# Patient Record
Sex: FEMALE | Race: WHITE | NOT HISPANIC OR LATINO | Employment: OTHER | URBAN - NONMETROPOLITAN AREA
[De-identification: names, ages, dates, MRNs, and addresses within clinical notes are randomized per-mention and may not be internally consistent; named-entity substitution may affect disease eponyms.]

---

## 2019-04-18 ENCOUNTER — OFFICE VISIT (OUTPATIENT)
Dept: NEUROSURGERY | Facility: CLINIC | Age: 59
End: 2019-04-18

## 2019-04-18 VITALS
WEIGHT: 154 LBS | SYSTOLIC BLOOD PRESSURE: 101 MMHG | HEIGHT: 65 IN | BODY MASS INDEX: 25.66 KG/M2 | HEART RATE: 64 BPM | RESPIRATION RATE: 16 BRPM | OXYGEN SATURATION: 95 % | TEMPERATURE: 97.3 F | DIASTOLIC BLOOD PRESSURE: 66 MMHG

## 2019-04-18 DIAGNOSIS — M51.36 DEGENERATIVE DISC DISEASE, LUMBAR: Primary | ICD-10-CM

## 2019-04-18 PROBLEM — M51.369 DEGENERATIVE DISC DISEASE, LUMBAR: Status: ACTIVE | Noted: 2019-04-18

## 2019-04-18 PROCEDURE — 99203 OFFICE O/P NEW LOW 30 MIN: CPT | Performed by: NEUROLOGICAL SURGERY

## 2019-04-18 RX ORDER — ATORVASTATIN CALCIUM 20 MG/1
20 TABLET, FILM COATED ORAL DAILY
COMMUNITY
End: 2022-01-12

## 2019-04-18 RX ORDER — CARVEDILOL 25 MG/1
25 TABLET ORAL 2 TIMES DAILY WITH MEALS
COMMUNITY
End: 2021-02-09

## 2019-04-18 RX ORDER — ASPIRIN 81 MG/1
81 TABLET ORAL DAILY
COMMUNITY

## 2019-04-18 RX ORDER — LISINOPRIL 5 MG/1
5 TABLET ORAL DAILY
COMMUNITY
End: 2021-02-09

## 2019-04-18 RX ORDER — NABUMETONE 750 MG/1
750 TABLET, FILM COATED ORAL 2 TIMES DAILY
Qty: 60 TABLET | Refills: 1 | Status: SHIPPED | OUTPATIENT
Start: 2019-04-18 | End: 2021-02-09

## 2019-04-18 RX ORDER — LEVOTHYROXINE SODIUM 112 UG/1
150 TABLET ORAL DAILY
COMMUNITY
End: 2021-07-14

## 2019-04-18 RX ORDER — SPIRONOLACTONE 25 MG/1
25 TABLET ORAL DAILY
COMMUNITY
End: 2021-02-09

## 2019-04-18 RX ORDER — METHOCARBAMOL 750 MG/1
750 TABLET, FILM COATED ORAL NIGHTLY
Qty: 30 TABLET | Refills: 1 | Status: SHIPPED | OUTPATIENT
Start: 2019-04-18 | End: 2019-06-17

## 2019-04-18 NOTE — PATIENT INSTRUCTIONS
Call Dr. Mascorro on a Monday or Tuesday with an update.    Ask for Mallika () and leave a message for  Dr. Mascorro.   He will call you back at the end of the day as soon as he can.     [phone]

## 2019-04-18 NOTE — PROGRESS NOTES
"Gayatri Kahn  1960  [id number]      Chief Complaint   Patient presents with   • Back Pain     MRI done 12/20/2018   • Neck Pain       HISTORY OF PRESENT ILLNESS: This is a 58-year-old female presenting with a long history of nonradicular low back pain.  When he becomes quite severe it radiates into the anterior thigh.  Does not radiate distal to the knee and is not consistent with neurogenic claudication or nerve root entrapment.  She been to physical therapy in the past which \"made matters worse\".  She has had a lumbar MRI and is referred for neurosurgical consultation.    Past Medical History:   Diagnosis Date   • Arthritis    • Heart disease    • Hyperlipidemia    • Hypertension        Past Surgical History:   Procedure Laterality Date   • FOOT SURGERY     • HYSTERECTOMY     • KNEE SURGERY         Family History   Problem Relation Age of Onset   • Heart disease Mother    • Cancer Father    • Cancer Sister    • Heart disease Sister    • Heart disease Brother        Social History     Socioeconomic History   • Marital status:      Spouse name: Not on file   • Number of children: Not on file   • Years of education: Not on file   • Highest education level: Not on file   Tobacco Use   • Smoking status: Current Every Day Smoker     Packs/day: 1.00     Years: 15.00     Pack years: 15.00     Types: Cigarettes   • Smokeless tobacco: Never Used   Substance and Sexual Activity   • Alcohol use: No     Frequency: Never   • Drug use: No       Allergies   Allergen Reactions   • Erythromycin Nausea Only         Current Outpatient Medications:   •  aspirin 81 MG EC tablet, Take 81 mg by mouth Daily., Disp: , Rfl:   •  atorvastatin (LIPITOR) 20 MG tablet, Take 20 mg by mouth Daily., Disp: , Rfl:   •  carvedilol (COREG) 25 MG tablet, Take 25 mg by mouth 2 (Two) Times a Day With Meals., Disp: , Rfl:   •  levothyroxine (SYNTHROID, LEVOTHROID) 112 MCG tablet, Take 112 mcg by mouth Daily., Disp: , Rfl:   •  lisinopril " "(PRINIVIL,ZESTRIL) 5 MG tablet, Take 5 mg by mouth Daily., Disp: , Rfl:   •  spironolactone (ALDACTONE) 25 MG tablet, Take 25 mg by mouth Daily., Disp: , Rfl:     Review of Systems    Vitals:    04/18/19 1206   BP: 101/66   BP Location: Right arm   Patient Position: Sitting   Pulse: 64   Resp: 16   Temp: 97.3 °F (36.3 °C)   TempSrc: Oral   SpO2: 95%   Weight: 69.9 kg (154 lb)   Height: 165.1 cm (65\")       Neurological Examination:    Mental status/speech: The patient is alert and oriented.  Speech is clear without aphysia or dysarthria.  No overt cognitive deficits.    Cranial nerve examination:    Olfaction: Smell is intact.  Vision: Vision is intact without visual field abnormalities.  Funduscopic examination is normal.  No pupillary irregularity.  Ocular motor examination: The extraocular muscles are intact.  There is no diplopia.  The pupil is round and reactive to both light and accommodation.  There is no nystagmus.  Facial movement/sensation: There is no facial weakness.  Sensation is intact in the first, second, and third divisions of the trigeminal nerve.  The corneal reflex is intact.  Auditory: Hearing is intact to finger rub bilaterally.  Cranial nerves IX, X, XI, XII: Phonation is normal.  No dysphagia.  Tongue is protruded in the midline without atrophy.  The gag reflex is intact.  Shoulder shrug is normal.    Musculoligamentous ligamentous examination: She has limitation of range of motion lumbar spine.  Straight leg raising, Lasegue's and flip test are negative.  I am unable to find any evidence of weakness, sensory loss or reflex asymmetry.  Her gait is normal.          Medical Decision Making:     Diagnostic Data Set: The lumbar MRI data set show the presence of degenerative disc disease.  L2-L3 she has evidence of instability with Modic changes.  She has mild spinal stenosis and disc protrusion L4-5 and L5-S1.  There is no high-grade spinal or neuroforaminal closure however.      Assessment: " Symptomatic degenerative osteoarthritis of the lumbar spine          Recommendations: Surgery is not a consideration.  I have given her a prescription of Relafen 750 mg twice daily Robaxin-750 milligrams at night.  I have suggested physical therapy however she did not wish to pursue that once again.  Therefore, if she is not a lot better I would strongly recommend epidural steroid injection or referral to pain management.  I do not know of any other solution for other than NSAIDs for degenerative osteoarthritic changes of the lumbar spine.        I greatly appreciate the opportunity to see and evaluate this individual.  If you have questions or concerns regarding issues that I may have overlooked please call me at any time: [phone].  Billy Mascorro M.D.  [facility]  [address] [place] [address]    Scribed for John Mascorro MD by Josephine Kemp CMA. 4/18/2019 12:24 PM     I have read and concur with the information provided by the scribe.  John Mascorro MD

## 2019-11-05 ENCOUNTER — TRANSCRIBE ORDERS (OUTPATIENT)
Dept: ADMINISTRATIVE | Facility: HOSPITAL | Age: 59
End: 2019-11-05

## 2019-11-05 DIAGNOSIS — E03.9 HYPOTHYROIDISM, UNSPECIFIED TYPE: ICD-10-CM

## 2019-11-05 DIAGNOSIS — Z87.891 PERSONAL HISTORY OF TOBACCO USE, PRESENTING HAZARDS TO HEALTH: Primary | ICD-10-CM

## 2019-11-11 ENCOUNTER — HOSPITAL ENCOUNTER (OUTPATIENT)
Dept: CT IMAGING | Facility: HOSPITAL | Age: 59
Discharge: HOME | End: 2019-11-11
Admitting: NURSE PRACTITIONER

## 2019-11-11 ENCOUNTER — HOSPITAL ENCOUNTER (OUTPATIENT)
Dept: ULTRASOUND IMAGING | Facility: HOSPITAL | Age: 59
Discharge: HOME | End: 2019-11-11

## 2019-11-11 DIAGNOSIS — E03.9 HYPOTHYROIDISM, UNSPECIFIED TYPE: ICD-10-CM

## 2019-11-11 DIAGNOSIS — Z87.891 PERSONAL HISTORY OF TOBACCO USE, PRESENTING HAZARDS TO HEALTH: ICD-10-CM

## 2019-11-11 PROCEDURE — 76536 US EXAM OF HEAD AND NECK: CPT

## 2019-11-11 PROCEDURE — G0297 LDCT FOR LUNG CA SCREEN: HCPCS

## 2020-08-07 ENCOUNTER — TRANSCRIBE ORDERS (OUTPATIENT)
Dept: ADMINISTRATIVE | Facility: HOSPITAL | Age: 60
End: 2020-08-07

## 2020-08-07 DIAGNOSIS — R91.1 LUNG NODULE: Primary | ICD-10-CM

## 2020-08-21 ENCOUNTER — APPOINTMENT (OUTPATIENT)
Dept: CT IMAGING | Facility: HOSPITAL | Age: 60
End: 2020-08-21

## 2020-09-10 DIAGNOSIS — Z00.6 EXAMINATION FOR NORMAL COMPARISON FOR CLINICAL RESEARCH: Primary | ICD-10-CM

## 2020-09-23 ENCOUNTER — TRANSCRIBE ORDERS (OUTPATIENT)
Dept: ADMINISTRATIVE | Facility: HOSPITAL | Age: 60
End: 2020-09-23

## 2020-09-23 DIAGNOSIS — M54.2 CERVICALGIA: Primary | ICD-10-CM

## 2020-10-10 ENCOUNTER — APPOINTMENT (OUTPATIENT)
Dept: GENERAL RADIOLOGY | Facility: HOSPITAL | Age: 60
End: 2020-10-10

## 2020-10-10 ENCOUNTER — APPOINTMENT (OUTPATIENT)
Dept: CT IMAGING | Facility: HOSPITAL | Age: 60
End: 2020-10-10

## 2020-10-10 ENCOUNTER — HOSPITAL ENCOUNTER (EMERGENCY)
Facility: HOSPITAL | Age: 60
Discharge: ANOTHER HOSPITAL | End: 2020-10-11
Attending: EMERGENCY MEDICINE | Admitting: EMERGENCY MEDICINE

## 2020-10-10 DIAGNOSIS — R47.81 SLURRED SPEECH: ICD-10-CM

## 2020-10-10 DIAGNOSIS — R41.0 CONFUSION: Primary | ICD-10-CM

## 2020-10-10 DIAGNOSIS — R27.0 ATAXIA: ICD-10-CM

## 2020-10-10 LAB
ALBUMIN SERPL-MCNC: 4.4 G/DL (ref 3.5–5.2)
ALBUMIN/GLOB SERPL: 1.6 G/DL
ALP SERPL-CCNC: 87 U/L (ref 39–117)
ALT SERPL W P-5'-P-CCNC: 16 U/L (ref 1–33)
ANION GAP SERPL CALCULATED.3IONS-SCNC: 11.2 MMOL/L (ref 5–15)
AST SERPL-CCNC: 21 U/L (ref 1–32)
BASOPHILS # BLD AUTO: 0.09 10*3/MM3 (ref 0–0.2)
BASOPHILS NFR BLD AUTO: 1.1 % (ref 0–1.5)
BILIRUB SERPL-MCNC: 0.3 MG/DL (ref 0–1.2)
BUN SERPL-MCNC: 9 MG/DL (ref 6–20)
BUN/CREAT SERPL: 12 (ref 7–25)
CALCIUM SPEC-SCNC: 10.1 MG/DL (ref 8.6–10.5)
CHLORIDE SERPL-SCNC: 100 MMOL/L (ref 98–107)
CO2 SERPL-SCNC: 27.8 MMOL/L (ref 22–29)
CREAT SERPL-MCNC: 0.75 MG/DL (ref 0.57–1)
DEPRECATED RDW RBC AUTO: 45.7 FL (ref 37–54)
EOSINOPHIL # BLD AUTO: 0.16 10*3/MM3 (ref 0–0.4)
EOSINOPHIL NFR BLD AUTO: 1.9 % (ref 0.3–6.2)
ERYTHROCYTE [DISTWIDTH] IN BLOOD BY AUTOMATED COUNT: 13.3 % (ref 12.3–15.4)
GFR SERPL CREATININE-BSD FRML MDRD: 79 ML/MIN/1.73
GLOBULIN UR ELPH-MCNC: 2.8 GM/DL
GLUCOSE BLDC GLUCOMTR-MCNC: 110 MG/DL (ref 70–130)
GLUCOSE SERPL-MCNC: 111 MG/DL (ref 65–99)
HCT VFR BLD AUTO: 39.6 % (ref 34–46.6)
HGB BLD-MCNC: 13.6 G/DL (ref 12–15.9)
HOLD SPECIMEN: NORMAL
HOLD SPECIMEN: NORMAL
IMM GRANULOCYTES # BLD AUTO: 0.02 10*3/MM3 (ref 0–0.05)
IMM GRANULOCYTES NFR BLD AUTO: 0.2 % (ref 0–0.5)
LYMPHOCYTES # BLD AUTO: 2.53 10*3/MM3 (ref 0.7–3.1)
LYMPHOCYTES NFR BLD AUTO: 30.4 % (ref 19.6–45.3)
MAGNESIUM SERPL-MCNC: 1.8 MG/DL (ref 1.6–2.6)
MCH RBC QN AUTO: 32.2 PG (ref 26.6–33)
MCHC RBC AUTO-ENTMCNC: 34.3 G/DL (ref 31.5–35.7)
MCV RBC AUTO: 93.8 FL (ref 79–97)
MONOCYTES # BLD AUTO: 0.53 10*3/MM3 (ref 0.1–0.9)
MONOCYTES NFR BLD AUTO: 6.4 % (ref 5–12)
NEUTROPHILS NFR BLD AUTO: 5 10*3/MM3 (ref 1.7–7)
NEUTROPHILS NFR BLD AUTO: 60 % (ref 42.7–76)
NRBC BLD AUTO-RTO: 0 /100 WBC (ref 0–0.2)
PLATELET # BLD AUTO: 292 10*3/MM3 (ref 140–450)
PMV BLD AUTO: 8.9 FL (ref 6–12)
POTASSIUM SERPL-SCNC: 4 MMOL/L (ref 3.5–5.2)
PROT SERPL-MCNC: 7.2 G/DL (ref 6–8.5)
RBC # BLD AUTO: 4.22 10*6/MM3 (ref 3.77–5.28)
SODIUM SERPL-SCNC: 139 MMOL/L (ref 136–145)
TROPONIN T SERPL-MCNC: <0.01 NG/ML (ref 0–0.03)
WBC # BLD AUTO: 8.33 10*3/MM3 (ref 3.4–10.8)
WHOLE BLOOD HOLD SPECIMEN: NORMAL
WHOLE BLOOD HOLD SPECIMEN: NORMAL

## 2020-10-10 PROCEDURE — 93005 ELECTROCARDIOGRAM TRACING: CPT | Performed by: EMERGENCY MEDICINE

## 2020-10-10 PROCEDURE — 80053 COMPREHEN METABOLIC PANEL: CPT | Performed by: EMERGENCY MEDICINE

## 2020-10-10 PROCEDURE — 82962 GLUCOSE BLOOD TEST: CPT

## 2020-10-10 PROCEDURE — 83735 ASSAY OF MAGNESIUM: CPT | Performed by: EMERGENCY MEDICINE

## 2020-10-10 PROCEDURE — 99284 EMERGENCY DEPT VISIT MOD MDM: CPT | Performed by: STUDENT IN AN ORGANIZED HEALTH CARE EDUCATION/TRAINING PROGRAM

## 2020-10-10 PROCEDURE — 84484 ASSAY OF TROPONIN QUANT: CPT | Performed by: EMERGENCY MEDICINE

## 2020-10-10 PROCEDURE — 99284 EMERGENCY DEPT VISIT MOD MDM: CPT

## 2020-10-10 PROCEDURE — 71045 X-RAY EXAM CHEST 1 VIEW: CPT

## 2020-10-10 PROCEDURE — 25010000002 IOPAMIDOL 61 % SOLUTION: Performed by: EMERGENCY MEDICINE

## 2020-10-10 PROCEDURE — 85025 COMPLETE CBC W/AUTO DIFF WBC: CPT | Performed by: EMERGENCY MEDICINE

## 2020-10-10 PROCEDURE — 70498 CT ANGIOGRAPHY NECK: CPT

## 2020-10-10 PROCEDURE — 70496 CT ANGIOGRAPHY HEAD: CPT

## 2020-10-10 PROCEDURE — 70450 CT HEAD/BRAIN W/O DYE: CPT

## 2020-10-10 PROCEDURE — 81001 URINALYSIS AUTO W/SCOPE: CPT | Performed by: EMERGENCY MEDICINE

## 2020-10-10 RX ORDER — SODIUM CHLORIDE 0.9 % (FLUSH) 0.9 %
10 SYRINGE (ML) INJECTION AS NEEDED
Status: DISCONTINUED | OUTPATIENT
Start: 2020-10-10 | End: 2020-10-11 | Stop reason: HOSPADM

## 2020-10-10 RX ADMIN — IOPAMIDOL 100 ML: 612 INJECTION, SOLUTION INTRAVENOUS at 21:58

## 2020-10-11 VITALS
HEART RATE: 99 BPM | OXYGEN SATURATION: 95 % | HEIGHT: 64 IN | RESPIRATION RATE: 14 BRPM | WEIGHT: 160 LBS | TEMPERATURE: 98.2 F | BODY MASS INDEX: 27.31 KG/M2 | DIASTOLIC BLOOD PRESSURE: 95 MMHG | SYSTOLIC BLOOD PRESSURE: 145 MMHG

## 2020-10-11 LAB
BACTERIA UR QL AUTO: ABNORMAL /HPF
BILIRUB UR QL STRIP: NEGATIVE
CLARITY UR: CLEAR
COLOR UR: YELLOW
GLUCOSE UR STRIP-MCNC: NEGATIVE MG/DL
HGB UR QL STRIP.AUTO: ABNORMAL
HYALINE CASTS UR QL AUTO: ABNORMAL /LPF
KETONES UR QL STRIP: NEGATIVE
LEUKOCYTE ESTERASE UR QL STRIP.AUTO: NEGATIVE
NITRITE UR QL STRIP: NEGATIVE
PH UR STRIP.AUTO: 7.5 [PH] (ref 5–8)
PROT UR QL STRIP: NEGATIVE
RBC # UR: ABNORMAL /HPF
REF LAB TEST METHOD: ABNORMAL
SP GR UR STRIP: 1.02 (ref 1–1.03)
SQUAMOUS #/AREA URNS HPF: ABNORMAL /HPF
UROBILINOGEN UR QL STRIP: ABNORMAL
WBC UR QL AUTO: ABNORMAL /HPF

## 2020-10-11 NOTE — ED PROVIDER NOTES
Subjective   60-year-old female presenting with strokelike symptoms.  Patient is brought in by her  who provides most of the history as patient is essentially nonverbal.  4 weeks ago patient was admitted to [facility] for a stroke.  Apparently at that time she had some right-sided weakness, slurred speech.  Has had some residual deficits but today at 2 PM became significantly worse.  No fevers, nausea, vomiting, cough.          Review of Systems   Unable to perform ROS: Other   Dysarthria      Past Medical History:   Diagnosis Date   • Arthritis    • Heart disease    • Hyperlipidemia    • Hypertension    • Stroke (CMS/HCC)        Allergies   Allergen Reactions   • Erythromycin Nausea Only       Past Surgical History:   Procedure Laterality Date   • FOOT SURGERY     • HYSTERECTOMY     • KNEE SURGERY         Family History   Problem Relation Age of Onset   • Heart disease Mother    • Cancer Father    • Cancer Sister    • Heart disease Sister    • Heart disease Brother        Social History     Socioeconomic History   • Marital status:      Spouse name: Not on file   • Number of children: Not on file   • Years of education: Not on file   • Highest education level: Not on file   Tobacco Use   • Smoking status: Current Every Day Smoker     Packs/day: 1.00     Years: 15.00     Pack years: 15.00     Types: Cigarettes   • Smokeless tobacco: Never Used   Substance and Sexual Activity   • Alcohol use: No     Frequency: Never   • Drug use: No           Objective   Physical Exam  Constitutional:       General: She is not in acute distress.     Appearance: She is not toxic-appearing or diaphoretic.      Comments: Chronically ill-appearing   HENT:      Head: Normocephalic and atraumatic.      Right Ear: External ear normal.      Left Ear: External ear normal.      Nose: Nose normal.      Mouth/Throat:      Mouth: Mucous membranes are moist.      Pharynx: Oropharynx is clear.   Eyes:      Extraocular Movements:  Extraocular movements intact.      Conjunctiva/sclera: Conjunctivae normal.      Pupils: Pupils are equal, round, and reactive to light.   Neck:      Musculoskeletal: Normal range of motion.   Cardiovascular:      Rate and Rhythm: Normal rate and regular rhythm.      Pulses: Normal pulses.      Heart sounds: Normal heart sounds.   Pulmonary:      Effort: Pulmonary effort is normal. No respiratory distress.      Breath sounds: Normal breath sounds.   Abdominal:      General: Bowel sounds are normal. There is no distension.      Tenderness: There is no abdominal tenderness.   Musculoskeletal: Normal range of motion.         General: No swelling, tenderness or deformity.   Skin:     General: Skin is warm and dry.      Capillary Refill: Capillary refill takes less than 2 seconds.      Findings: No rash.   Neurological:      Mental Status: She is alert.      Comments: Fairly significant dysarthria, mild left-sided drift and ataxia, cranial nerves grossly intact   Psychiatric:         Mood and Affect: Mood normal.         Behavior: Behavior normal.         Procedures           ED Course                                           MDM  Number of Diagnoses or Management Options  Ataxia:   Confusion:   Slurred speech:   Diagnosis management comments: 60-year-old female with slurred speech, confusion, ataxia.  Chronically ill-appearing lady in no distress with exam as above.  Will obtain head CT, labs, EKG.  Will likely consult neurology.  Disposition pending.    DDX: CVA, TIA, electrolyte abnormality, dehydration    EKG interpreted by me: Sinus tachycardia, short VA, some nonspecific ST/T changes, this is an abnormal EKG    23:18 EDT initial work-up is unremarkable.  Patient has been evaluated by Dr. Husain, there is apparently some history of recent Derrek Mountain spotted fever infection, it sounds like she was treated with a course of antibiotics.  Given her symptoms and recent evaluation at  and inability to get MRI  here tonight he is recommended transfer back to .  We will try to discuss with their neurologist.    23:40 EDT Discussed with Dr. Velasquez, neurology at , he has graciously accepted patient in transfer.      Final diagnoses:   Confusion   Slurred speech   Ataxia            Jose Ingram MD  10/10/20 7984

## 2020-10-11 NOTE — ED NOTES
Dr. Velasquez on the phone to speak with Dr. Ingram. Call transferred.      Robert White  10/10/20 3071

## 2020-10-11 NOTE — CONSULTS
Stroke Consult Note    Patient Name: Gayatri Kahn   MRN: [id number]  Age: 60 y.o.  Sex: female  : 1960    Primary Care Physician: Jaqueline Jean APRN  Referring Physician:  No ref. provider found    TIME STROKE TEAM CALLED:   EST     TIME PATIENT SEEN: 2140  EST    Handedness: R  Race: W    Chief Complaint/Reason for Consultation: right sided weakness     Subjective .  HPI:  This is 60 year old RHWF with h/o ? left cerebral hemisphere stroke 1 month ago s/p residual right sided deficits walks with a cane, tick bite subsequently treated for pili mountain spotted fever prior to stroke presents today to the emergency department for concerns of increased confusion, slurred speech and worsening of old stroke deficits.     Patient was taking aspirin 81 prior to her stroke.   Denies any history of afib.       Last Known Normal Date/Time: 10/853924 @ 6 Am.  EST     Review of Systems   Constitutional: No fatigue  HENT: Negative for nosebleeds and rhinorrhea.    Eyes: Negative for redness.   Respiratory: Negative for cough.    Gastrointestinal: Negative for anal bleeding.   Endocrine: Negative for polydipsia.   Genitourinary: Negative for enuresis and urgency.   Musculoskeletal: Negative for joint swelling.   Neurological: Negative for tremors.   Psychiatric/Behavioral: Negative for hallucinations.     Temp:  [98.2 °F (36.8 °C)] 98.2 °F (36.8 °C)  Heart Rate:  [104] 104  Resp:  [14] 14  BP: (132)/(81) 132/81      GEN: confused  Eyes-show anicteric sclera, moist conjunctiva with no lid lag, no redness  Neck-trachea midline.  There is no thyromegaly.  ENMT-oropharynx clear with moist mucous membranes and good dentition.  Skin- not examined   Cardiovascular exam-no pedal edema, regular rate and rhythm.  CHEST: No signs of resp distress, on room air  Abdomen-no abdominal distention, nontender.  Psychiatric exam-alert oriented x2 with not intact judgment and insight      NEURO    MENTAL STATUS: AAOx2, memory  not intact, fund of knowledge not appropriate    LANG/SPEECH: Naming and repetition not  intact, fluent, unable to  follows 3-step commands    CRANIAL NERVES:      II: left pupil reactive, right pupil does not react,  no VF deficits, fundus(not done)      III, IV, VI: EOM intact, no gaze preference or deviation, no nystagmus.      V: normal sensation in V1, V2, and V3 segments bilaterally      VII: no asymmetry, no nasolabial fold flattening      VIII: normal hearing to speech      IX, X: normal palatal elevation, no uvular deviation      XI: 5/5 head turn and 5/5 shoulder shrug bilaterally      XII: midline tongue protrusion    MOTOR:  Normal tone throughout  Drift in the bilateral lower extremities       REFLEXES:  Not tested     SENSORY:    Normal to light touch all throughout     COORDINATION: Normal finger to nose and unable to do heel to shin, no tremor    STATION: Not assessed due to patient condition    GAIT: Not assessed due to patient condition  Acute Stroke Data    Alteplase (tPA) Inclusion / Exclusion Criteria    Time: 21:52 EDT  Person Administering Scale: Braulio Husain MD    Inclusion Criteria  [x]   18 years of age or greater   []   Onset of symptoms < 4.5 hours before beginning treatment (stroke onset = time patient was last seen well or without symptoms).   []   Diagnosis of acute ischemic stroke causing measurable disabling deficit (Complete Hemianopia, Any Aphasia, Visual or Sensory Extinction, Any weakness limiting sustained effort against gravity)   []   Any remaining deficit considered potentially disabling in view of patient and practitioner   Exclusion criteria (Do not proceed with Alteplase if any are checked under exclusion criteria)  [x]   Onset unknown or GREATER than 4.5 hours   []   ICH on CT/MRI   []   CT demonstrates hypodensity representing acute or subacute infarct   []   Significant head trauma or prior stroke in the previous 3 months   []   Symptoms suggestive of subarachnoid  hemorrhage   []   History of un-ruptured intracranial aneurysm GREATER than 10 mm   []   Recent intracranial or intraspinal surgery within the last 3 months   []   Arterial puncture at a non-compressible site in the previous 7 days   []   Active internal bleeding   []   Acute bleeding tendency   []   Platelet count LESS than 100,000 for known hematological diseases such as leukemia, thrombocytopenia or chronic cirrhosis   []   Current use of anticoagulant with INR GREATER than 1.7 or PT GREATER than 15 seconds, aPTT GREATER than 40 seconds   []   Heparin received within 48 hours, resulting in abnormally elevated aPTT GREATER than upper limit of normal   []   Current use of direct thrombin inhibitors or direct factor Xa inhibitors in the past 48 hours   []   Elevated blood pressure refractory to treatment (systolic GREATER than 185 mm/Hg or diastolic  GREATER than 110 mm/Hg   []   Suspected infective endocarditis and aortic arch dissection   []   Current use of therapeutic treatment dose of low-molecular-weight heparin (LMWH) within the previous 24 hours   []   Structural GI malignancy or bleed   Relative exclusion for all patients  []   Only minor non-disabling symptoms   []   Pregnancy   []   Seizure at onset with postictal residual neurological impairments   []   Major surgery or previous trauma within past 14 days   []   History of previous spontaneous ICH, intracranial neoplasm, or AV malformation   []   Postpartum (within previous 14 days)   []   Recent GI or urinary tract hemorrhage (within previous 21 days)   []   Recent acute MI (within previous 3 months)   []   History of un-ruptured intracranial aneurysm LESS than 10 mm   []   History of ruptured intracranial aneurysm   []   Blood glucose LESS than 50 mg/dL (2.7 mmol/L)   []   Dural puncture within the last 7 days   []   Known GREATER than 10 cerebral microbleeds   Additional exclusions for patients with symptoms onset between 3 and 4.5 hours.  []   Age >  80.   []   On any anticoagulants regardless of INR  >>> Warfarin (Coumadin), Heparin, Enoxaparin (Lovenox), fondaparinux (Arixtra), bivalirudin (Angiomax), Argatroban, dabigatran (Pradaxa), rivaroxaban (Xarelto), or apixaban (Eliquis)   []   Severe stroke (NIHSS > 25).   []   History of BOTH diabetes and previous ischemic stroke.   []   The risks and benefits have been discussed with the patient or family related to the administration of IV Alteplase for stroke symptoms.   []   I have discussed and reviewed the patient's case and imaging with the attending prior to IV Alteplase.    Time Alteplase administered       Past Medical History:   Diagnosis Date   • Arthritis    • Heart disease    • Hyperlipidemia    • Hypertension    • Stroke (CMS/HCC)      Past Surgical History:   Procedure Laterality Date   • FOOT SURGERY     • HYSTERECTOMY     • KNEE SURGERY       Family History   Problem Relation Age of Onset   • Heart disease Mother    • Cancer Father    • Cancer Sister    • Heart disease Sister    • Heart disease Brother      Social History     Socioeconomic History   • Marital status:      Spouse name: Not on file   • Number of children: Not on file   • Years of education: Not on file   • Highest education level: Not on file   Tobacco Use   • Smoking status: Current Every Day Smoker     Packs/day: 1.00     Years: 15.00     Pack years: 15.00     Types: Cigarettes   • Smokeless tobacco: Never Used   Substance and Sexual Activity   • Alcohol use: No     Frequency: Never   • Drug use: No     Allergies   Allergen Reactions   • Erythromycin Nausea Only     Prior to Admission medications    Medication Sig Start Date End Date Taking? Authorizing Provider   aspirin 81 MG EC tablet Take 81 mg by mouth Daily.    ProviderArvind MD   atorvastatin (LIPITOR) 20 MG tablet Take 20 mg by mouth Daily.    Provider, MD Arvind   carvedilol (COREG) 25 MG tablet Take 25 mg by mouth 2 (Two) Times a Day With Meals.     Arvind Maloney MD   levothyroxine (SYNTHROID, LEVOTHROID) 112 MCG tablet Take 112 mcg by mouth Daily.    Arvind Maloney MD   lisinopril (PRINIVIL,ZESTRIL) 5 MG tablet Take 5 mg by mouth Daily.    Arvind Maloney MD   nabumetone (RELAFEN) 750 MG tablet Take 1 tablet by mouth 2 (Two) Times a Day. 19   John Mascorro MD   spironolactone (ALDACTONE) 25 MG tablet Take 25 mg by mouth Daily.    Arvind Maloney MD       Hospital Meds:  Scheduled-    Infusions-     PRNs- •  sodium chloride    Functional Status Prior to Current Stroke/Claritza Score: 3    NIH Stroke Scale  Time: 21:52 EDT  Person Administering Scale: Braulio Husain MD    1a  Level of consciousness: 0=alert; keenly responsive   1b. LOC questions:  0=Performs both tasks correctly   1c. LOC commands: 1=Performs one task correctly   2.  Best Gaze: 0=normal   3.  Visual: 0=No visual loss   4. Facial Palsy: 0=Normal symmetric movement   5a.  Motor left arm: 0=No drift, limb holds 90 (or 45) degrees for full 10 seconds   5b.  Motor right arm: 0=No drift, limb holds 90 (or 45) degrees for full 10 seconds   6a. motor left le=Drift, limb holds 90 (or 45) degrees but drifts down before full 10 seconds: does not hit bed   6b  Motor right le=Drift, limb holds 90 (or 45) degrees but drifts down before full 10 seconds: does not hit bed   7. Limb Ataxia: 0=Absent   8.  Sensory: 0=Normal; no sensory loss   9. Best Language:  1=Mild to moderate aphasia; some obvious loss of fluency or facility of comprehension without significant limitation on ideas expressed or form of expression.   10. Dysarthria: 1=Mild to moderate, patient slurs at least some words and at worst, can be understood with some difficulty   11. Extinction and Inattention: 0=No abnormality    Total:    5       Results Reviewed:  I have personally reviewed current lab, radiology, and data and agree with results.               Assessment/Plan:      This is 60 with h/o  shahla, diagnosed with RMSF 6 weeks ago, subsequently developed stroke with residual right sided deficits  6 Weeks ago and was treated at ,  presented today with worsening speech and right sided deficits with increased confusion.     On my exam, via tele, she is altered and follows commands intermittently. Speech seems to be worse, but rest of the exam is her baseline deficits. CT head no acute abnormality. CTA head documented mild atherosclerosis extracranially and intracranially without flow limiting stenosis.    Patient is not a candidate for acute stroke intervention therapies. But, I am concerned about her confusion and the previous history of RMSF 6 weeks ago. Also, I do not have access to imaging from .  Overall, based on history and exam, she needs infectious work up and may be continuous EEG monitoring for possible subclinical seizures. We do not have access for MRI brain at [place] on the weekend. I recommend transferring the patient to tertiary facility for further evaluation.                 Braulio Husain MD  October 10, 2020  21:52 EDT    Verbal consent taken.  Patient agreeable to be seen via telemedicine.    This was an audio and video enabled telemedicine encounter.

## 2020-12-03 ENCOUNTER — HOSPITAL ENCOUNTER (EMERGENCY)
Facility: HOSPITAL | Age: 60
Discharge: HOME | End: 2020-12-03
Attending: EMERGENCY MEDICINE | Admitting: EMERGENCY MEDICINE

## 2020-12-03 ENCOUNTER — APPOINTMENT (OUTPATIENT)
Dept: CT IMAGING | Facility: HOSPITAL | Age: 60
End: 2020-12-03

## 2020-12-03 VITALS
HEIGHT: 65 IN | TEMPERATURE: 98 F | BODY MASS INDEX: 24.99 KG/M2 | WEIGHT: 150 LBS | RESPIRATION RATE: 20 BRPM | DIASTOLIC BLOOD PRESSURE: 90 MMHG | SYSTOLIC BLOOD PRESSURE: 146 MMHG | OXYGEN SATURATION: 92 % | HEART RATE: 93 BPM

## 2020-12-03 DIAGNOSIS — I15.9 SECONDARY HYPERTENSION: Primary | ICD-10-CM

## 2020-12-03 LAB
ALBUMIN SERPL-MCNC: 4 G/DL (ref 3.5–5.2)
ALBUMIN/GLOB SERPL: 1.3 G/DL
ALP SERPL-CCNC: 104 U/L (ref 39–117)
ALT SERPL W P-5'-P-CCNC: 14 U/L (ref 1–33)
ANION GAP SERPL CALCULATED.3IONS-SCNC: 9 MMOL/L (ref 5–15)
AST SERPL-CCNC: 16 U/L (ref 1–32)
BASOPHILS # BLD AUTO: 0.1 10*3/MM3 (ref 0–0.2)
BASOPHILS NFR BLD AUTO: 1.8 % (ref 0–1.5)
BILIRUB SERPL-MCNC: 0.2 MG/DL (ref 0–1.2)
BUN SERPL-MCNC: 5 MG/DL (ref 6–20)
BUN/CREAT SERPL: 6.9 (ref 7–25)
CALCIUM SPEC-SCNC: 10.5 MG/DL (ref 8.6–10.5)
CHLORIDE SERPL-SCNC: 101 MMOL/L (ref 98–107)
CO2 SERPL-SCNC: 32 MMOL/L (ref 22–29)
CREAT SERPL-MCNC: 0.72 MG/DL (ref 0.57–1)
DEPRECATED RDW RBC AUTO: 46 FL (ref 37–54)
EOSINOPHIL # BLD AUTO: 0.15 10*3/MM3 (ref 0–0.4)
EOSINOPHIL NFR BLD AUTO: 2.6 % (ref 0.3–6.2)
ERYTHROCYTE [DISTWIDTH] IN BLOOD BY AUTOMATED COUNT: 13.3 % (ref 12.3–15.4)
GFR SERPL CREATININE-BSD FRML MDRD: 83 ML/MIN/1.73
GLOBULIN UR ELPH-MCNC: 3 GM/DL
GLUCOSE SERPL-MCNC: 153 MG/DL (ref 65–99)
HCT VFR BLD AUTO: 42 % (ref 34–46.6)
HGB BLD-MCNC: 13.8 G/DL (ref 12–15.9)
IMM GRANULOCYTES # BLD AUTO: 0 10*3/MM3 (ref 0–0.05)
IMM GRANULOCYTES NFR BLD AUTO: 0 % (ref 0–0.5)
LYMPHOCYTES # BLD AUTO: 2.06 10*3/MM3 (ref 0.7–3.1)
LYMPHOCYTES NFR BLD AUTO: 36.2 % (ref 19.6–45.3)
MCH RBC QN AUTO: 30.9 PG (ref 26.6–33)
MCHC RBC AUTO-ENTMCNC: 32.9 G/DL (ref 31.5–35.7)
MCV RBC AUTO: 94 FL (ref 79–97)
MONOCYTES # BLD AUTO: 0.32 10*3/MM3 (ref 0.1–0.9)
MONOCYTES NFR BLD AUTO: 5.6 % (ref 5–12)
NEUTROPHILS NFR BLD AUTO: 3.06 10*3/MM3 (ref 1.7–7)
NEUTROPHILS NFR BLD AUTO: 53.8 % (ref 42.7–76)
NRBC BLD AUTO-RTO: 0 /100 WBC (ref 0–0.2)
PLATELET # BLD AUTO: 274 10*3/MM3 (ref 140–450)
PMV BLD AUTO: 9.2 FL (ref 6–12)
POTASSIUM SERPL-SCNC: 3.8 MMOL/L (ref 3.5–5.2)
PROT SERPL-MCNC: 7 G/DL (ref 6–8.5)
RBC # BLD AUTO: 4.47 10*6/MM3 (ref 3.77–5.28)
SODIUM SERPL-SCNC: 142 MMOL/L (ref 136–145)
WBC # BLD AUTO: 5.69 10*3/MM3 (ref 3.4–10.8)

## 2020-12-03 PROCEDURE — 85025 COMPLETE CBC W/AUTO DIFF WBC: CPT | Performed by: PHYSICIAN ASSISTANT

## 2020-12-03 PROCEDURE — 70450 CT HEAD/BRAIN W/O DYE: CPT

## 2020-12-03 PROCEDURE — 93005 ELECTROCARDIOGRAM TRACING: CPT | Performed by: EMERGENCY MEDICINE

## 2020-12-03 PROCEDURE — 80053 COMPREHEN METABOLIC PANEL: CPT | Performed by: PHYSICIAN ASSISTANT

## 2020-12-03 PROCEDURE — 99283 EMERGENCY DEPT VISIT LOW MDM: CPT

## 2020-12-03 NOTE — ED PROVIDER NOTES
"Subjective   60-year-old female presents with hypertension, she said high readings of her blood pressure at home over the last several days intermittently.  She was taken off lisinopril at [facility] recently, she has a rare neurologic condition.  She has dizziness with the neurologic condition but her daughter states that the dizziness has gotten worse especially when her pressure seems to be up.  She was concerned about \"stroke\" she states it difficult because the neurologic diagnosis that she has also has similar symptoms.      History provided by:  Patient   used: No        Review of Systems   Cardiovascular:        Hypertension   Neurological: Positive for dizziness.   All other systems reviewed and are negative.      Past Medical History:   Diagnosis Date   • Arthritis    • Heart disease    • Hyperlipidemia    • Hypertension    • Lambert-Eaton syndrome (CMS/HCC)    • Stroke (CMS/HCC)        Allergies   Allergen Reactions   • Erythromycin Nausea Only       Past Surgical History:   Procedure Laterality Date   • FOOT SURGERY     • HYSTERECTOMY     • KNEE SURGERY         Family History   Problem Relation Age of Onset   • Heart disease Mother    • Cancer Father    • Cancer Sister    • Heart disease Sister    • Heart disease Brother        Social History     Socioeconomic History   • Marital status:      Spouse name: Not on file   • Number of children: Not on file   • Years of education: Not on file   • Highest education level: Not on file   Tobacco Use   • Smoking status: Current Every Day Smoker     Packs/day: 1.00     Years: 15.00     Pack years: 15.00     Types: Cigarettes   • Smokeless tobacco: Never Used   Substance and Sexual Activity   • Alcohol use: No     Frequency: Never   • Drug use: No           Objective   Physical Exam  Vitals signs and nursing note reviewed.   Constitutional:       Appearance: She is well-developed.   HENT:      Head: Normocephalic.   Neck:      " Musculoskeletal: Normal range of motion and neck supple.   Cardiovascular:      Rate and Rhythm: Normal rate and regular rhythm.   Pulmonary:      Effort: Pulmonary effort is normal.      Breath sounds: Normal breath sounds.   Abdominal:      General: Bowel sounds are normal.      Palpations: Abdomen is soft.   Skin:     General: Skin is warm and dry.   Neurological:      Mental Status: She is alert. Mental status is at baseline.      Sensory: Sensory deficit present.      Motor: Weakness present.      Coordination: Coordination abnormal.      Deep Tendon Reflexes: Reflexes are normal and symmetric.      Comments: Slurred speech         Procedures           ED Course  ED Course as of Dec 03 1252   Thu Dec 03, 2020   1139 EKG interpreted by me: Sinus rhythm, normal rate, some nonspecific T wave changes, no acute ST elevations, this is an abnormal EKG, morphology appears similar to previous    [MP]   1250 Discussed the findings with the patient and her daughter, considering they are under the care of neurologist at  who is recommending hold blood pressure medicines, I recommended that they call neurology, the daughter felt confident she could reach them today, to address blood pressure issues.    [CS]      ED Course User Index  [CS] Eddy Bee Jr., PA-C  [MP] Jose Ingram MD                                           MDM  Number of Diagnoses or Management Options  Secondary hypertension: new and requires workup     Amount and/or Complexity of Data Reviewed  Clinical lab tests: reviewed  Tests in the radiology section of CPT®: reviewed    Risk of Complications, Morbidity, and/or Mortality  Presenting problems: minimal  Diagnostic procedures: minimal  Management options: minimal    Patient Progress  Patient progress: stable      Final diagnoses:   Secondary hypertension            Eddy Bee Jr., PA-C  12/03/20 1253

## 2021-02-09 ENCOUNTER — HOSPITAL ENCOUNTER (OUTPATIENT)
Dept: RADIATION ONCOLOGY | Facility: HOSPITAL | Age: 61
Setting detail: RADIATION/ONCOLOGY SERIES
Discharge: HOME | End: 2021-02-09

## 2021-02-09 ENCOUNTER — OFFICE VISIT (OUTPATIENT)
Dept: RADIATION ONCOLOGY | Facility: HOSPITAL | Age: 61
End: 2021-02-09

## 2021-02-09 ENCOUNTER — CONSULT (OUTPATIENT)
Dept: ONCOLOGY | Facility: CLINIC | Age: 61
End: 2021-02-09

## 2021-02-09 ENCOUNTER — TELEPHONE (OUTPATIENT)
Dept: ONCOLOGY | Facility: CLINIC | Age: 61
End: 2021-02-09

## 2021-02-09 ENCOUNTER — HOSPITAL ENCOUNTER (OUTPATIENT)
Dept: RADIATION ONCOLOGY | Facility: HOSPITAL | Age: 61
Discharge: HOME | End: 2021-02-09

## 2021-02-09 VITALS
SYSTOLIC BLOOD PRESSURE: 112 MMHG | DIASTOLIC BLOOD PRESSURE: 68 MMHG | TEMPERATURE: 96 F | HEART RATE: 104 BPM | RESPIRATION RATE: 18 BRPM | OXYGEN SATURATION: 93 % | BODY MASS INDEX: 24.99 KG/M2 | HEIGHT: 65 IN | WEIGHT: 150 LBS

## 2021-02-09 VITALS
SYSTOLIC BLOOD PRESSURE: 97 MMHG | DIASTOLIC BLOOD PRESSURE: 61 MMHG | RESPIRATION RATE: 18 BRPM | OXYGEN SATURATION: 90 % | TEMPERATURE: 96 F | HEART RATE: 101 BPM

## 2021-02-09 DIAGNOSIS — R59.0 ADENOPATHY, HILAR: Primary | ICD-10-CM

## 2021-02-09 DIAGNOSIS — R59.0 ADENOPATHY, HILAR: ICD-10-CM

## 2021-02-09 DIAGNOSIS — C34.31 SMALL CELL LUNG CANCER, RIGHT LOWER LOBE (HCC): Primary | ICD-10-CM

## 2021-02-09 DIAGNOSIS — C34.31 SMALL CELL LUNG CANCER, RIGHT LOWER LOBE (HCC): ICD-10-CM

## 2021-02-09 DIAGNOSIS — C34.90 SMALL CELL LUNG CANCER (HCC): ICD-10-CM

## 2021-02-09 PROCEDURE — 99205 OFFICE O/P NEW HI 60 MIN: CPT | Performed by: INTERNAL MEDICINE

## 2021-02-09 PROCEDURE — G0463 HOSPITAL OUTPT CLINIC VISIT: HCPCS | Performed by: RADIOLOGY

## 2021-02-09 RX ORDER — GABAPENTIN 300 MG/1
CAPSULE ORAL
COMMUNITY
End: 2021-02-09

## 2021-02-09 RX ORDER — LANCETS 33 GAUGE
EACH MISCELLANEOUS 2 TIMES DAILY
COMMUNITY
Start: 2020-12-02 | End: 2021-02-09

## 2021-02-09 RX ORDER — LEVOCETIRIZINE DIHYDROCHLORIDE 5 MG/1
TABLET, FILM COATED ORAL
COMMUNITY
End: 2021-02-09

## 2021-02-09 RX ORDER — AMOXICILLIN AND CLAVULANATE POTASSIUM 875; 125 MG/1; MG/1
TABLET, FILM COATED ORAL
COMMUNITY
End: 2021-02-09

## 2021-02-09 RX ORDER — BLOOD-GLUCOSE METER
EACH MISCELLANEOUS 2 TIMES DAILY
COMMUNITY
Start: 2020-12-02 | End: 2021-02-09

## 2021-02-09 RX ORDER — MYCOPHENOLATE MOFETIL 500 MG/1
TABLET ORAL
COMMUNITY
Start: 2021-01-20 | End: 2022-01-12

## 2021-02-09 RX ORDER — HYDROCODONE BITARTRATE AND ACETAMINOPHEN 5; 325 MG/1; MG/1
TABLET ORAL
COMMUNITY
End: 2021-02-09

## 2021-02-09 RX ORDER — ALBUTEROL SULFATE 90 UG/1
AEROSOL, METERED RESPIRATORY (INHALATION)
COMMUNITY
End: 2021-02-09

## 2021-02-09 RX ORDER — TIZANIDINE 4 MG/1
TABLET ORAL
COMMUNITY
End: 2021-02-09

## 2021-02-09 RX ORDER — BLOOD SUGAR DIAGNOSTIC
STRIP MISCELLANEOUS 2 TIMES DAILY
COMMUNITY
Start: 2020-12-02 | End: 2021-02-09

## 2021-02-09 RX ORDER — LOSARTAN POTASSIUM 25 MG/1
TABLET ORAL
COMMUNITY
Start: 2020-12-03 | End: 2021-02-09

## 2021-02-09 RX ORDER — METOPROLOL SUCCINATE 25 MG/1
TABLET, EXTENDED RELEASE ORAL
COMMUNITY
Start: 2021-01-13 | End: 2022-01-12

## 2021-02-09 RX ORDER — MECLIZINE HYDROCHLORIDE 25 MG/1
TABLET ORAL
COMMUNITY
Start: 2021-01-13 | End: 2021-02-09

## 2021-02-09 NOTE — PROGRESS NOTES
CONSULTATION NOTE    NAME:      Gayatri Kahn  :                                                          1960  DATE OF CONSULTATION:                       21  REQUESTING PHYSICIAN:                   Susi Irving MD  REASON FOR CONSULTATION:           Further evaluation and management of the patient's presumed small cell lung cancer based on serologic diagnosis for Lambert-Eaton syndrome with a markedly hot right hilar mass with SUV of 14 request of Dr. Irving for consideration of palliative radiotherapy  Small cell lung cancer, right lower lobe (CMS/HCC)  Staging form: Lung, AJCC 8th Edition  - Clinical stage from 2021: Stage IIB (cT1c, cN1, cM0) - Signed by Susi Irving MD on 2021           BRIEF HISTORY:  Gayatri Kahn  is a very pleasant 60 y.o. female  who began to have some dizziness and weakness back in July.  This progressed the patient had an MRI scan of her brain and CT scan of her chest that showed a right hilar mass surrounding the right lower lobe bronchus and artery extending into the area of the subcarinal mediastinum.  Patient's MRI of the brain was normal.  The patient was then sent for bronchoscopy and EBUS first in [place] and then at .  Tissue was obtained from the right lymph nodes but was negative for evidence of malignancy back on 9/10/2020.  The patient then had serologies which were ultimately positive for AGN A1 antibodies which should have a 92% specificity for small cell lung cancer.  She received additional work-up of her head and neck in October to no avail.  This was repeated in 2020.  Patient was then sent for a PET scan 2021 that showed a mass at the right hilum with an SUV of 14 and no other lymphatic or distant disease in the chest.  Patient was referred to Dr. Irving's clinic to discuss possible options.  Patient was deemed to be too weak for chemotherapy patient was referred for palliative radiotherapy with hopes that she could  potentially regain some function and strong enough for systemic therapy.    Patient is unable to consistently speak well enough to have an unaided conversation.  Her  corroborates the above story is able to answer questions for her.  The patient has been reports that she has fairly decent strength in her arms and legs.  She has decent mobility.  He reports it is very difficult for her to begin walking but when she begins she is able to walk fairly well with assistance.  She is unable to walk by herself however, due to her symptoms.  The patient's  reports that she is not had any hemoptysis.  She has minimal cough or changes in her respiratory status.      Allergies   Allergen Reactions   • Erythromycin Nausea Only       Social History     Tobacco Use   • Smoking status: Current Every Day Smoker     Packs/day: 1.00     Years: 15.00     Pack years: 15.00     Types: Cigarettes   • Smokeless tobacco: Never Used   Substance Use Topics   • Alcohol use: No     Frequency: Never   • Drug use: No       Current Outpatient Medications:   •  aspirin 81 MG EC tablet, Take 81 mg by mouth Daily., Disp: , Rfl:   •  atorvastatin (LIPITOR) 20 MG tablet, Take 20 mg by mouth Daily., Disp: , Rfl:   •  Firdapse 10 MG tablet, Take 10 mg by mouth 4 (Four) Times a Day., Disp: , Rfl:   •  levothyroxine (SYNTHROID, LEVOTHROID) 112 MCG tablet, Take 150 mcg by mouth Daily., Disp: , Rfl:   •  metoprolol succinate XL (TOPROL-XL) 25 MG 24 hr tablet, , Disp: , Rfl:   •  mycophenolate (CELLCEPT) 500 MG tablet, , Disp: , Rfl:     Past Medical History:   Diagnosis Date   • Arthritis    • Heart disease    • Hyperlipidemia    • Lambert-Eaton syndrome (CMS/HCC)    • Lung cancer (CMS/HCC)        family history includes Brain cancer in her father; Breast cancer in her sister; Heart disease in her brother, mother, and sister; Kidney disease in her mother.     Past Surgical History:   Procedure Laterality Date   • FOOT SURGERY     • HYSTERECTOMY      • KNEE SURGERY          Review of Systems   Cardiovascular: Positive for leg swelling.   Neurological: Positive for dizziness, light-headedness and speech difficulty.    A full 14 point review of systems was performed and was negative except as noted in the HPI.       Objective   VITAL SIGNS:   Vitals:    02/09/21 1103   BP: 97/61   Pulse: 101   Resp: 18   Temp: 96 °F (35.6 °C)   SpO2: 90%   PainSc: 0-No pain        KPS       70%    Physical Exam  Vitals signs and nursing note reviewed.   Constitutional:       Appearance: She is well-developed.   HENT:      Head: Normocephalic and atraumatic.   Eyes:      Conjunctiva/sclera: Conjunctivae normal.      Pupils: Pupils are equal, round, and reactive to light.   Neck:      Musculoskeletal: Normal range of motion and neck supple.      Comments: No obviously enlarged cervical or supraclavicular LAD.  Cardiovascular:      Comments: Patient well perfused. Non cyanotic. No prominent JVD. No pedal edema  Pulmonary:      Effort: Pulmonary effort is normal.      Breath sounds: Normal breath sounds. No stridor. No wheezing.   Abdominal:      General: There is no distension.      Palpations: Abdomen is soft.   Musculoskeletal: Normal range of motion.      Comments: Patient moves all extremities spontaneously.    Skin:     General: Skin is warm and dry.   Neurological:      Mental Status: She is alert and oriented to person, place, and time.      Motor: Weakness present.      Coordination: Coordination abnormal.      Gait: Gait abnormal.      Comments: Coordination intact.   Psychiatric:         Behavior: Behavior normal.         Thought Content: Thought content normal.         Judgment: Judgment normal.              The following portions of the patient's history were reviewed and updated as appropriate: allergies, current medications, past family history, past medical history, past social history, past surgical history and problem list.    The patient's PET scan from 1/7/2021  was reviewed showing hypermetabolic mass at the right hilum with SUV of 14.  The patient's CT scans of the head were reviewed from September October and December showing no overt abnormalities.    The patient EBUS report from  was reviewed as was her bronchoscopy report from [place]: Both showing no evidence of malignancy in the samples.  Patient CT scan of chest from 9/10/2020 was reviewed showing right hilar mass with no obvious other lymphadenopathy.    Assessment      IMPRESSION:     Mrs. Kahn is a very pleasant 60-year-old female with that what does appear to be limited stage small cell lung cancer.  Her disease had been stable for quite some time.  She does have symptoms somewhat consistent with Lambert-Eaton but are also somewhat different as well.  The largest factor clinching her diagnosis at this time would be the serologies for AgNA 1.  We discussed this with the patient and her  and have offered her palliative radiation treatments to the right hilum.  We discussed that we do not have a final pathologic diagnosis for this and that her working diagnosis is somewhat presumptive, more so than usual.  I explained that these treatments may potentially not benefit her at all and they do have the potential for some harm including collapse of the bronchus or bleeding from major vessels.  There is also no guarantee that this will help alleviate her Lambert-Eaton syndrome.  There is however a chance that he could help alleviate symptoms of her Lambert-Eaton syndrome and in the case that this is cancer, which likely appears to be, then would certainly help prevent progression locally into other sensitive structures.  The patient and her  are willing to try anything which may help her at this time point.  I recommend palliative treatments to dose of 45 Gray in 15 fractions VMAT in effort to minimize dose to her esophagus lung and heart.  I would likely recommend that the patient have her serologies  repeated after he finished treatment should her symptoms improve.    The decision to treat the patient with radiation is a complex one and carries the risk of long-term side effects and complications.    I discussed this plan personally with Dr. Irving.    Greater than 1 hour was spent preparing for and coordinating this visit. >50% of the time was spent in direct face to face conversation with the patient teaching, answering question, and providing explanations regarding the patient's case.    RECOMMENDATIONS:    T1N1 right hilar limited stage small cell lung cancer, presumptive diagnosis  -Serologies positive for Lambert-Eaton syndrome Ag NA-1 antibody + does have a high specificity for small cell lung cancer   -would recommend repeating at the end of treatments  -PET scan shows a mass at the right hilum with SUV of 14 which is highly suspicious for cancer  -I have offered the patient palliative treatments  -They understand that this is a long shot to potentially help her symptoms and is unlikely to extend her life as if this is small cell she is a very high risk for distant recurrence  -Recommend 45 Carvalho in 15 fractions to the right hilum and adjacent mediastinum VMAT in effort to spare the lungs, esophagus, heart.  -CT simulation today given patient's condition and difficulty of traveling from her cancer related issue    Hypertension  -Continue present regimen    Hyper lipidemia  -Continue present regimen    Tobacco abuse  -Recommend cessation of possible         Owen Cardenas MD      Errors in dictation may reflect use of voice recognition software and not all errors in transcription may have been detected prior to signing.

## 2021-02-09 NOTE — PROGRESS NOTES
DATE OF CONSULTATION: 2/9/2021    REFERRING PHYSICIAN: Jaqueline Jean APRN    Dear Jaqueline Tobar APRN  Thank you for asking for my medical advice on this patient. I saw her in the  [place] office on 2/9/2021    REASON FOR CONSULTATION: Right lower lobe small cell lung cancer    HISTORY OF PRESENT ILLNESS: The patient is a very pleasant 60 y.o.  female   who was in her usual state of health until July 2020.  Patient presented with multiple neurologic symptoms including dizziness imbalance and lower extremity weakness.  Never had this problem before.  This has been getting gradually worse.  Denies any headaches no trauma.  Symptoms get worse with activity and improves with rest.  [facility]  MRI brain was negative CT chest revealed right hilar mass.  The patient was referred to pulmonary she had bronchoscopy August 9, 2020 done has noted that was negative for endobronchial lesions.  Endobronchial ultrasound done at  September 9, 2020 with multiple biopsies from right hilar lymph nodes and clear level 11 level 12 mediastinal nodes including level 5 and level 7 all came back negative.  She was discharged from  Hospital October 13, 2020.  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  Her serology revealed AGNA-1 antibody which is 92% specific of small cell lung cancer. The patient had repeat CT chest done December 7, 2020 revealed right hilar mass 2.7 cm in size whole-body PET scan done on November 11, 2021 revealed isolated hypermetabolic activity SUV of 12.  The patient was referred to me for further recommendations.    SUBJECTIVE: When I saw the patient today she is here with her .  She is complaining of dizziness and fatigue.  She is in wheelchair.  She is anxious about her new cancer diagnosis.    Review of Systems   Constitutional: Negative for activity change, appetite change, chills, fatigue, fever and unexpected weight  change.   HENT: Negative for hearing loss, mouth sores, nosebleeds, sore throat and trouble swallowing.    Eyes: Negative for visual disturbance.   Respiratory: Negative for cough, chest tightness, shortness of breath and wheezing.    Cardiovascular: Negative for chest pain, palpitations and leg swelling.   Gastrointestinal: Negative for abdominal distention, abdominal pain, blood in stool, constipation, diarrhea, nausea, rectal pain and vomiting.   Endocrine: Negative for cold intolerance and heat intolerance.   Genitourinary: Negative for difficulty urinating, dysuria, frequency and urgency.   Musculoskeletal: Negative for arthralgias, back pain, gait problem, joint swelling and myalgias.   Skin: Negative for rash.   Neurological: Negative for dizziness, tremors, syncope, weakness, light-headedness, numbness and headaches.   Hematological: Negative for adenopathy. Does not bruise/bleed easily.   Psychiatric/Behavioral: Negative for confusion, sleep disturbance and suicidal ideas. The patient is not nervous/anxious.        Past Medical History:   Diagnosis Date   • Arthritis    • Heart disease    • Hyperlipidemia    • Hypertension    • Lambert-Eaton syndrome (CMS/HCC)    • Stroke (CMS/HCC)        Social History     Socioeconomic History   • Marital status:      Spouse name: Not on file   • Number of children: Not on file   • Years of education: Not on file   • Highest education level: Not on file   Tobacco Use   • Smoking status: Current Every Day Smoker     Packs/day: 1.00     Years: 15.00     Pack years: 15.00     Types: Cigarettes   • Smokeless tobacco: Never Used   Substance and Sexual Activity   • Alcohol use: No     Frequency: Never   • Drug use: No       Family History   Problem Relation Age of Onset   • Heart disease Mother    • Cancer Father    • Cancer Sister    • Heart disease Sister    • Heart disease Brother        Past Surgical History:   Procedure Laterality Date   • FOOT SURGERY     •  HYSTERECTOMY     • KNEE SURGERY         Allergies   Allergen Reactions   • Erythromycin Nausea Only          Current Outpatient Medications:   •  aspirin 81 MG EC tablet, Take 81 mg by mouth Daily., Disp: , Rfl:   •  atorvastatin (LIPITOR) 20 MG tablet, Take 20 mg by mouth Daily., Disp: , Rfl:   •  carvedilol (COREG) 25 MG tablet, Take 25 mg by mouth 2 (Two) Times a Day With Meals., Disp: , Rfl:   •  levothyroxine (SYNTHROID, LEVOTHROID) 112 MCG tablet, Take 112 mcg by mouth Daily., Disp: , Rfl:   •  lisinopril (PRINIVIL,ZESTRIL) 5 MG tablet, Take 5 mg by mouth Daily., Disp: , Rfl:   •  metoprolol tartrate (LOPRESSOR) 25 MG tablet, Take 12.5 mg by mouth 2 (Two) Times a Day., Disp: , Rfl:   •  nabumetone (RELAFEN) 750 MG tablet, Take 1 tablet by mouth 2 (Two) Times a Day., Disp: 60 tablet, Rfl: 1  •  spironolactone (ALDACTONE) 25 MG tablet, Take 25 mg by mouth Daily., Disp: , Rfl:     PHYSICAL EXAMINATION:   There were no vitals taken for this visit.  There were no vitals filed for this visit.                ECOG Performance Status: 3 - Symptomatic, >50% confined to bed  General Appearance:  alert, cooperative, no apparent distress and appears stated age   Neurologic/Psychiatric: A&O x 3, gait steady, appropriate affect, strength 5/5 in all muscle groups   HEENT:  Normocephalic, without obvious abnormality, mucous membranes moist   Neck: Supple, symmetrical, trachea midline, no adenopathy;  No thyromegaly, masses, or tenderness   Lungs:   Clear to auscultation bilaterally; respirations regular, even, and unlabored bilaterally   Heart:  Regular rate and rhythm, no murmurs appreciated   Abdomen:   Soft, non-tender, non-distended and no organomegaly   Lymph nodes: No cervical, supraclavicular, inguinal or axillary adenopathy noted   Extremities: Normal, atraumatic; no clubbing, cyanosis, or edema    Skin: No rashes, ulcers, or suspicious lesions noted       No visits with results within 2 Week(s) from this visit.    Latest known visit with results is:   Admission on 12/03/2020, Discharged on 12/03/2020   Component Date Value Ref Range Status   • WBC 12/03/2020 5.69  3.40 - 10.80 10*3/mm3 Final   • RBC 12/03/2020 4.47  3.77 - 5.28 10*6/mm3 Final   • Hemoglobin 12/03/2020 13.8  12.0 - 15.9 g/dL Final   • Hematocrit 12/03/2020 42.0  34.0 - 46.6 % Final   • MCV 12/03/2020 94.0  79.0 - 97.0 fL Final   • MCH 12/03/2020 30.9  26.6 - 33.0 pg Final   • MCHC 12/03/2020 32.9  31.5 - 35.7 g/dL Final   • RDW 12/03/2020 13.3  12.3 - 15.4 % Final   • RDW-SD 12/03/2020 46.0  37.0 - 54.0 fl Final   • MPV 12/03/2020 9.2  6.0 - 12.0 fL Final   • Platelets 12/03/2020 274  140 - 450 10*3/mm3 Final   • Neutrophil % 12/03/2020 53.8  42.7 - 76.0 % Final   • Lymphocyte % 12/03/2020 36.2  19.6 - 45.3 % Final   • Monocyte % 12/03/2020 5.6  5.0 - 12.0 % Final   • Eosinophil % 12/03/2020 2.6  0.3 - 6.2 % Final   • Basophil % 12/03/2020 1.8* 0.0 - 1.5 % Final   • Immature Grans % 12/03/2020 0.0  0.0 - 0.5 % Final   • Neutrophils, Absolute 12/03/2020 3.06  1.70 - 7.00 10*3/mm3 Final   • Lymphocytes, Absolute 12/03/2020 2.06  0.70 - 3.10 10*3/mm3 Final   • Monocytes, Absolute 12/03/2020 0.32  0.10 - 0.90 10*3/mm3 Final   • Eosinophils, Absolute 12/03/2020 0.15  0.00 - 0.40 10*3/mm3 Final   • Basophils, Absolute 12/03/2020 0.10  0.00 - 0.20 10*3/mm3 Final   • Immature Grans, Absolute 12/03/2020 0.00  0.00 - 0.05 10*3/mm3 Final   • nRBC 12/03/2020 0.0  0.0 - 0.2 /100 WBC Final   • Glucose 12/03/2020 153* 65 - 99 mg/dL Final   • BUN 12/03/2020 5* 6 - 20 mg/dL Final   • Creatinine 12/03/2020 0.72  0.57 - 1.00 mg/dL Final   • Sodium 12/03/2020 142  136 - 145 mmol/L Final   • Potassium 12/03/2020 3.8  3.5 - 5.2 mmol/L Final    Slight hemolysis detected by analyzer. Results may be affected.   • Chloride 12/03/2020 101  98 - 107 mmol/L Final   • CO2 12/03/2020 32.0* 22.0 - 29.0 mmol/L Final   • Calcium 12/03/2020 10.5  8.6 - 10.5 mg/dL Final   • Total Protein  12/03/2020 7.0  6.0 - 8.5 g/dL Final   • Albumin 12/03/2020 4.00  3.50 - 5.20 g/dL Final   • ALT (SGPT) 12/03/2020 14  1 - 33 U/L Final   • AST (SGOT) 12/03/2020 16  1 - 32 U/L Final   • Alkaline Phosphatase 12/03/2020 104  39 - 117 U/L Final   • Total Bilirubin 12/03/2020 0.2  0.0 - 1.2 mg/dL Final   • eGFR Non African Amer 12/03/2020 83  >60 mL/min/1.73 Final   • Globulin 12/03/2020 3.0  gm/dL Final   • A/G Ratio 12/03/2020 1.3  g/dL Final   • BUN/Creatinine Ratio 12/03/2020 6.9* 7.0 - 25.0 Final   • Anion Gap 12/03/2020 9.0  5.0 - 15.0 mmol/L Final        No results found.      DIAGNOSTIC DATA:   1.  Extensive outpatient medical records including DrChely Notes, laboratory results, multiple imaging including CT chest and whole-body PET scan, multiple pathology reports including biopsy from bronchoscopy and later on endobronchial ultrasound all reviewed by me and documented the patient chart under media tab.    ASSESSMENT: The patient is a very pleasant 60 y.o.  female  with right small cell lung cancer    PROBLEM LIST:   1.  Right lower lobe small cell lung cancer T1 N1 M0 stage IIb:  A.  Presented with ataxia and lower extremities weakness  B.  Negative bronchoscopy August 19, 2020 and endobronchial ultrasound September 9, 2020  C.  CT chest done December 7, 2020 revealed 2.7 cm right hilar mass  D.  Whole-body PET scan done January 11, 2020 revealed isolated hypermetabolic activity at the right hilar mass SUV of 12  E. positive serology for AGN A-1 antibody which is 92% predicted for small cell lung cancer, positive N type calcium channel blocker and P/Q type calcium channel blooker.  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  Hypertension  4.  Hypercholesteremia    PLAN:   1. I had a long discussion today with the patient and her  about her  new diagnosis of small cell lung cancer. I did go over the final pathology report in  detail with both of them. I reviewed the patient's documents  including refereing provider's notes, lab results, imaging, and path report.   2.  I will order MRI brain to complete her staging work-up  3.  The patient is not a good candidate for concurrent definitive chemotherapy with radiation given her poor performance status.  4.  Discussed the case with Dr. Cardenas from radiation oncology over the phone who kindly agreed to the patient today and started palpitation to the right hilum.  If the patient improves clinically we will consider adding chemotherapy down the road with for  5.  She will follow-up with me in 2 weeks to evaluate for treatment tolerance.  6.  We will continue metoprolol 12.5 mg daily for hypertension  7. Continue Lipitor 20 mg daily for hypercholesterolemia  Susi Irving MD  2/9/2021

## 2021-02-09 NOTE — TELEPHONE ENCOUNTER
Called and spoke with patients daughter (release on file) and read to her Dr. Irving's plan in his note.  This answered her questions.  Encouraged her at patients f/u to have patient or family member with her to call when MD is in visit so she can hear what he says.  She verbalized understanding.

## 2021-02-09 NOTE — TELEPHONE ENCOUNTER
Gayatri's daughter Maris is asking for a call from someone to discuss pt's appt today and treatment plan     #  [phone]

## 2021-02-16 PROCEDURE — 77338 DESIGN MLC DEVICE FOR IMRT: CPT | Performed by: RADIOLOGY

## 2021-02-16 PROCEDURE — 77301 RADIOTHERAPY DOSE PLAN IMRT: CPT | Performed by: RADIOLOGY

## 2021-02-16 PROCEDURE — 77300 RADIATION THERAPY DOSE PLAN: CPT | Performed by: RADIOLOGY

## 2021-02-22 ENCOUNTER — HOSPITAL ENCOUNTER (OUTPATIENT)
Dept: RADIATION ONCOLOGY | Facility: HOSPITAL | Age: 61
Discharge: HOME | End: 2021-02-22

## 2021-02-23 ENCOUNTER — HOSPITAL ENCOUNTER (OUTPATIENT)
Dept: RADIATION ONCOLOGY | Facility: HOSPITAL | Age: 61
Discharge: HOME | End: 2021-02-23

## 2021-02-23 VITALS — BODY MASS INDEX: 24.1 KG/M2 | WEIGHT: 144.8 LBS

## 2021-02-23 PROCEDURE — 77386: CPT | Performed by: RADIOLOGY

## 2021-02-24 ENCOUNTER — HOSPITAL ENCOUNTER (OUTPATIENT)
Dept: RADIATION ONCOLOGY | Facility: HOSPITAL | Age: 61
Discharge: HOME | End: 2021-02-24

## 2021-02-24 PROCEDURE — 77386: CPT | Performed by: RADIOLOGY

## 2021-02-25 ENCOUNTER — HOSPITAL ENCOUNTER (OUTPATIENT)
Dept: RADIATION ONCOLOGY | Facility: HOSPITAL | Age: 61
Discharge: HOME | End: 2021-02-25

## 2021-02-25 PROCEDURE — 77386: CPT | Performed by: RADIOLOGY

## 2021-02-26 ENCOUNTER — HOSPITAL ENCOUNTER (OUTPATIENT)
Dept: RADIATION ONCOLOGY | Facility: HOSPITAL | Age: 61
Discharge: HOME | End: 2021-02-26

## 2021-02-26 PROCEDURE — 77336 RADIATION PHYSICS CONSULT: CPT | Performed by: RADIOLOGY

## 2021-02-26 PROCEDURE — 77386: CPT | Performed by: RADIOLOGY

## 2021-03-01 ENCOUNTER — HOSPITAL ENCOUNTER (OUTPATIENT)
Dept: RADIATION ONCOLOGY | Facility: HOSPITAL | Age: 61
Setting detail: RADIATION/ONCOLOGY SERIES
Discharge: HOME | End: 2021-03-01

## 2021-03-01 ENCOUNTER — HOSPITAL ENCOUNTER (OUTPATIENT)
Dept: RADIATION ONCOLOGY | Facility: HOSPITAL | Age: 61
Discharge: HOME | End: 2021-03-01

## 2021-03-01 PROCEDURE — 77386: CPT | Performed by: RADIOLOGY

## 2021-03-02 ENCOUNTER — OFFICE VISIT (OUTPATIENT)
Dept: ONCOLOGY | Facility: CLINIC | Age: 61
End: 2021-03-02

## 2021-03-02 ENCOUNTER — HOSPITAL ENCOUNTER (OUTPATIENT)
Dept: RADIATION ONCOLOGY | Facility: HOSPITAL | Age: 61
Discharge: HOME | End: 2021-03-02

## 2021-03-02 ENCOUNTER — HOSPITAL ENCOUNTER (OUTPATIENT)
Dept: MRI IMAGING | Facility: HOSPITAL | Age: 61
Discharge: HOME | End: 2021-03-02
Admitting: INTERNAL MEDICINE

## 2021-03-02 VITALS — WEIGHT: 143.9 LBS | BODY MASS INDEX: 23.95 KG/M2

## 2021-03-02 VITALS
WEIGHT: 144 LBS | DIASTOLIC BLOOD PRESSURE: 80 MMHG | SYSTOLIC BLOOD PRESSURE: 124 MMHG | HEART RATE: 108 BPM | HEIGHT: 65 IN | TEMPERATURE: 96.2 F | OXYGEN SATURATION: 98 % | RESPIRATION RATE: 18 BRPM | BODY MASS INDEX: 23.99 KG/M2

## 2021-03-02 DIAGNOSIS — R59.0 ADENOPATHY, HILAR: ICD-10-CM

## 2021-03-02 DIAGNOSIS — C34.90 SMALL CELL LUNG CANCER (HCC): ICD-10-CM

## 2021-03-02 DIAGNOSIS — C34.31 SMALL CELL LUNG CANCER, RIGHT LOWER LOBE (HCC): Primary | ICD-10-CM

## 2021-03-02 PROCEDURE — 70553 MRI BRAIN STEM W/O & W/DYE: CPT

## 2021-03-02 PROCEDURE — 0 GADOBENATE DIMEGLUMINE 529 MG/ML SOLUTION: Performed by: INTERNAL MEDICINE

## 2021-03-02 PROCEDURE — 99214 OFFICE O/P EST MOD 30 MIN: CPT | Performed by: INTERNAL MEDICINE

## 2021-03-02 PROCEDURE — A9577 INJ MULTIHANCE: HCPCS | Performed by: INTERNAL MEDICINE

## 2021-03-02 PROCEDURE — 77386: CPT | Performed by: RADIOLOGY

## 2021-03-02 RX ORDER — LEVOTHYROXINE SODIUM 0.15 MG/1
150 TABLET ORAL DAILY
COMMUNITY
Start: 2021-02-10

## 2021-03-02 RX ORDER — TRIAMCINOLONE ACETONIDE 1 MG/G
CREAM TOPICAL
COMMUNITY
Start: 2021-02-24 | End: 2022-01-12

## 2021-03-02 RX ADMIN — GADOBENATE DIMEGLUMINE 14 ML: 529 INJECTION, SOLUTION INTRAVENOUS at 09:19

## 2021-03-02 NOTE — PROGRESS NOTES
DATE OF VISIT: 3/2/2021    REASON FOR VISIT: Followup for non-small cell lung cancer     HISTORY OF PRESENT ILLNESS: The patient is a very pleasant 60 y.o. female  with past medical history significant for non-small cell lung cancer diagnosed January 2021. The  patient is here today for scheduled follow-up visit.    SUBJECTIVE: The patient has been doing fairly well. she was able to tolerate  her treatment without any serious side effects. she denied any fever or  chills, no night sweats, denied any headaches    PAST MEDICAL HISTORY/SOCIAL HISTORY/FAMILY HISTORY: Reviewed by me and unchanged from my documentation done on 03/02/21.    Review of Systems   Constitutional: Negative for activity change, appetite change, chills, fatigue, fever and unexpected weight change.   HENT: Negative for hearing loss, mouth sores, nosebleeds, sore throat and trouble swallowing.    Eyes: Negative for visual disturbance.   Respiratory: Negative for cough, chest tightness, shortness of breath and wheezing.    Cardiovascular: Negative for chest pain, palpitations and leg swelling.   Gastrointestinal: Negative for abdominal distention, abdominal pain, blood in stool, constipation, diarrhea, nausea, rectal pain and vomiting.   Endocrine: Negative for cold intolerance and heat intolerance.   Genitourinary: Negative for difficulty urinating, dysuria, frequency and urgency.   Musculoskeletal: Negative for arthralgias, back pain, gait problem, joint swelling and myalgias.   Skin: Negative for rash.   Neurological: Negative for dizziness, tremors, syncope, weakness, light-headedness, numbness and headaches.   Hematological: Negative for adenopathy. Does not bruise/bleed easily.   Psychiatric/Behavioral: Negative for confusion, sleep disturbance and suicidal ideas. The patient is not nervous/anxious.          Current Outpatient Medications:   •  aspirin 81 MG EC tablet, Take 81 mg by mouth Daily., Disp: , Rfl:   •  atorvastatin (LIPITOR) 20 MG  "tablet, Take 20 mg by mouth Daily., Disp: , Rfl:   •  Firdapse 10 MG tablet, Take 10 mg by mouth 4 (Four) Times a Day., Disp: , Rfl:   •  levothyroxine (SYNTHROID, LEVOTHROID) 112 MCG tablet, Take 150 mcg by mouth Daily., Disp: , Rfl:   •  levothyroxine (SYNTHROID, LEVOTHROID) 150 MCG tablet, , Disp: , Rfl:   •  metoprolol succinate XL (TOPROL-XL) 25 MG 24 hr tablet, , Disp: , Rfl:   •  mycophenolate (CELLCEPT) 500 MG tablet, , Disp: , Rfl:   •  triamcinolone (KENALOG) 0.1 % cream, , Disp: , Rfl:   No current facility-administered medications for this visit.     PHYSICAL EXAMINATION:   /80   Pulse 108   Temp 96.2 °F (35.7 °C) (Temporal)   Resp 18   Ht 165.1 cm (65\")   Wt 65.3 kg (144 lb)   SpO2 98%   BMI 23.96 kg/m²    Pain Score    03/02/21 1132   PainSc: 0-No pain        ECOG score: 2            ECOG Performance Status: 1 - Symptomatic but completely ambulatory  General Appearance:  alert, cooperative, no apparent distress and appears stated age   Neurologic/Psychiatric: A&O x 3, gait steady, appropriate affect, strength 5/5 in all muscle groups   HEENT:  Normocephalic, without obvious abnormality, mucous membranes moist   Neck: Supple, symmetrical, trachea midline, no adenopathy;  No thyromegaly, masses, or tenderness   Lungs:   Clear to auscultation bilaterally; respirations regular, even, and unlabored bilaterally   Heart:  Regular rate and rhythm, no murmurs appreciated   Abdomen:   Soft, non-tender, non-distended and no organomegaly   Lymph nodes: No cervical, supraclavicular, inguinal or axillary adenopathy noted   Extremities: Normal, atraumatic; no clubbing, cyanosis, or edema    Skin: No rashes, ulcers, or suspicious lesions noted     No visits with results within 2 Week(s) from this visit.   Latest known visit with results is:   Admission on 12/03/2020, Discharged on 12/03/2020   Component Date Value Ref Range Status   • WBC 12/03/2020 5.69  3.40 - 10.80 10*3/mm3 Final   • RBC 12/03/2020 " 4.47  3.77 - 5.28 10*6/mm3 Final   • Hemoglobin 12/03/2020 13.8  12.0 - 15.9 g/dL Final   • Hematocrit 12/03/2020 42.0  34.0 - 46.6 % Final   • MCV 12/03/2020 94.0  79.0 - 97.0 fL Final   • MCH 12/03/2020 30.9  26.6 - 33.0 pg Final   • MCHC 12/03/2020 32.9  31.5 - 35.7 g/dL Final   • RDW 12/03/2020 13.3  12.3 - 15.4 % Final   • RDW-SD 12/03/2020 46.0  37.0 - 54.0 fl Final   • MPV 12/03/2020 9.2  6.0 - 12.0 fL Final   • Platelets 12/03/2020 274  140 - 450 10*3/mm3 Final   • Neutrophil % 12/03/2020 53.8  42.7 - 76.0 % Final   • Lymphocyte % 12/03/2020 36.2  19.6 - 45.3 % Final   • Monocyte % 12/03/2020 5.6  5.0 - 12.0 % Final   • Eosinophil % 12/03/2020 2.6  0.3 - 6.2 % Final   • Basophil % 12/03/2020 1.8* 0.0 - 1.5 % Final   • Immature Grans % 12/03/2020 0.0  0.0 - 0.5 % Final   • Neutrophils, Absolute 12/03/2020 3.06  1.70 - 7.00 10*3/mm3 Final   • Lymphocytes, Absolute 12/03/2020 2.06  0.70 - 3.10 10*3/mm3 Final   • Monocytes, Absolute 12/03/2020 0.32  0.10 - 0.90 10*3/mm3 Final   • Eosinophils, Absolute 12/03/2020 0.15  0.00 - 0.40 10*3/mm3 Final   • Basophils, Absolute 12/03/2020 0.10  0.00 - 0.20 10*3/mm3 Final   • Immature Grans, Absolute 12/03/2020 0.00  0.00 - 0.05 10*3/mm3 Final   • nRBC 12/03/2020 0.0  0.0 - 0.2 /100 WBC Final   • Glucose 12/03/2020 153* 65 - 99 mg/dL Final   • BUN 12/03/2020 5* 6 - 20 mg/dL Final   • Creatinine 12/03/2020 0.72  0.57 - 1.00 mg/dL Final   • Sodium 12/03/2020 142  136 - 145 mmol/L Final   • Potassium 12/03/2020 3.8  3.5 - 5.2 mmol/L Final    Slight hemolysis detected by analyzer. Results may be affected.   • Chloride 12/03/2020 101  98 - 107 mmol/L Final   • CO2 12/03/2020 32.0* 22.0 - 29.0 mmol/L Final   • Calcium 12/03/2020 10.5  8.6 - 10.5 mg/dL Final   • Total Protein 12/03/2020 7.0  6.0 - 8.5 g/dL Final   • Albumin 12/03/2020 4.00  3.50 - 5.20 g/dL Final   • ALT (SGPT) 12/03/2020 14  1 - 33 U/L Final   • AST (SGOT) 12/03/2020 16  1 - 32 U/L Final   • Alkaline Phosphatase  12/03/2020 104  39 - 117 U/L Final   • Total Bilirubin 12/03/2020 0.2  0.0 - 1.2 mg/dL Final   • eGFR Non African Amer 12/03/2020 83  >60 mL/min/1.73 Final   • Globulin 12/03/2020 3.0  gm/dL Final   • A/G Ratio 12/03/2020 1.3  g/dL Final   • BUN/Creatinine Ratio 12/03/2020 6.9* 7.0 - 25.0 Final   • Anion Gap 12/03/2020 9.0  5.0 - 15.0 mmol/L Final        Mri Brain With & Without Contrast    Result Date: 3/2/2021  Narrative: PROCEDURE: MRI BRAIN W WO CONTRAST-  HISTORY: staging scans for small scan lung cancer.; R59.0-Localized enlarged lymph nodes; C34.90-Malignant neoplasm of unspecified part of unspecified bronchus or lung  TECHNIQUE: Multiplanar imaging was performed of the brain with and without Gadolinium infusion.  FINDINGS: Diffusion sequences show no signal abnormalities to indicate acute infarct or other process.  Brain parenchyma displays normal signal without evidence of mass, hemorrhage or edema. No extra-axial abnormal findings are seen. The ventricles and cisterns appear normal. No abnormal enhancing lesions are identified on the post infusion images.      Impression: Unremarkable MRI of the brain, without and with Gadolinium administration.  This report was finalized on 3/2/2021 10:18 AM by Ezequiel Shah MD.    Ct Outside Films    Result Date: 2/9/2021  Narrative: This procedure was auto-finalized with no dictation required.    Ct Outside Films    Result Date: 2/9/2021  Narrative: This procedure was auto-finalized with no dictation required.      ASSESSMENT: The patient is a very pleasant 60 y.o. female  with right small cell lung cancer    PROBLEM LIST:   1.  Right lower lobe small cell lung cancer T1 N1 M0 stage IIb:  A.  Presented with ataxia and lower extremities weakness  B.  Negative bronchoscopy August 19, 2020 and endobronchial ultrasound September 9, 2020  C.  CT chest done December 7, 2020 revealed 2.7 cm right hilar mass  D.  Whole-body PET scan done January 11, 2020 revealed isolated  hypermetabolic activity at the right hilar mass SUV of 12  E. positive serology for AGN A-1 antibody which is 92% predicted for small cell lung cancer, positive N type calcium channel blocker and P/Q type calcium channel blooker.  F.  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  Hypertension  4.  Hypercholesteremia    PLAN:  1.  We will continue daily radiation.  Patient is scheduled for total of 15 treatments.  2.  Follow-up patient clinically.  3.  She will follow-up with me in 6 weeks  4.  We will repeat the patient scans in 3 months.  5.  If the patient gets stronger will consider adding chemotherapy.  6.  She will continue Firdapse for Eaton-Lambert syndrome.  7.  We will continue to monitor patient blood pressure.    Susi Irving MD  3/2/2021

## 2021-03-03 ENCOUNTER — HOSPITAL ENCOUNTER (OUTPATIENT)
Dept: RADIATION ONCOLOGY | Facility: HOSPITAL | Age: 61
Discharge: HOME | End: 2021-03-03

## 2021-03-03 PROCEDURE — 77386: CPT | Performed by: RADIOLOGY

## 2021-03-04 ENCOUNTER — HOSPITAL ENCOUNTER (OUTPATIENT)
Dept: RADIATION ONCOLOGY | Facility: HOSPITAL | Age: 61
Discharge: HOME | End: 2021-03-04

## 2021-03-04 PROCEDURE — 77386: CPT | Performed by: RADIOLOGY

## 2021-03-05 ENCOUNTER — HOSPITAL ENCOUNTER (OUTPATIENT)
Dept: RADIATION ONCOLOGY | Facility: HOSPITAL | Age: 61
Discharge: HOME | End: 2021-03-05

## 2021-03-05 PROCEDURE — 77336 RADIATION PHYSICS CONSULT: CPT | Performed by: RADIOLOGY

## 2021-03-05 PROCEDURE — 77386: CPT | Performed by: RADIOLOGY

## 2021-03-08 ENCOUNTER — HOSPITAL ENCOUNTER (OUTPATIENT)
Dept: RADIATION ONCOLOGY | Facility: HOSPITAL | Age: 61
Discharge: HOME | End: 2021-03-08

## 2021-03-08 PROCEDURE — 77386: CPT | Performed by: RADIOLOGY

## 2021-03-09 ENCOUNTER — HOSPITAL ENCOUNTER (OUTPATIENT)
Dept: RADIATION ONCOLOGY | Facility: HOSPITAL | Age: 61
Discharge: HOME | End: 2021-03-09

## 2021-03-09 ENCOUNTER — DOCUMENTATION (OUTPATIENT)
Dept: NUTRITION | Facility: HOSPITAL | Age: 61
End: 2021-03-09

## 2021-03-09 VITALS — BODY MASS INDEX: 24.31 KG/M2 | WEIGHT: 146.1 LBS

## 2021-03-09 PROCEDURE — 77386: CPT | Performed by: RADIOLOGY

## 2021-03-09 NOTE — PROGRESS NOTES
"Outpatient Oncology Nutrition     Reason for Visit:     Oncology Nutrition Screening and Patient Education    Patient Name:  Gayatri Kahn    :  1960    MRN:  [id number]    Date of Encounter: 2021    Nutrition Assessment     Cancer Dx:   Non-small cell lung cancer diagnosed 2021 / Right sided Stage IIB    Type of Cancer Treatment:     Chemotherapy:  Possible consideration if the patient get stronger     Radiation: 15 treatments    Patient Active Problem List:    Patient Active Problem List   Diagnosis   • Degenerative disc disease, lumbar   • Adenopathy, hilar   • Small cell lung cancer, right lower lobe (CMS/HCC)       Food / Nutrition Related History       Hydration Status     Goal:  70-75 ounces per day    Enteral Feeding     N/A  Anthropometric Measurements     Height:    Ht Readings from Last 1 Encounters:   21 165.1 cm (65\")       Weight:    Wt Readings from Last 1 Encounters:   21 65.3 kg (143 lb 14.4 oz)       BMI:  23.96 / Normal    Weight Change:  6 lbs lost in the past 3 months       Review of Lab Data (Time Frame - 1 month / 2 month)     Lab work reviewed, noting hyperglycemia (153 mg/dl - 3 months ago)  Medication Review     Medications reviewed 3/9/21  Nutrition Focused Physical Findings       Nutrition Impact Symptoms      Problems chewing/swallowing      Physical Activity      Able to do little activity and spend most of the day in bed or chair    Current Nutritional Intake     Oral diet:  Regular    Oral nutritional supplements:    Intake:    Malnutrition Risk Assessment     Recent weight loss over the past 6 months:  Yes    How much weight loss:  1 = 2-13 lbs    Eating poorly because of a decreased appetite:  1 = Yes    Malnutrition Screening Score:     MST = 2 more Patient at risk for malnutrition     Nutrition Diagnosis     Problem    Etiology    Signs / Symptoms      Nutrition Intervention       Goal       Monitoring / Evaluation           "

## 2021-03-10 ENCOUNTER — HOSPITAL ENCOUNTER (OUTPATIENT)
Dept: RADIATION ONCOLOGY | Facility: HOSPITAL | Age: 61
Discharge: HOME | End: 2021-03-10

## 2021-03-10 PROCEDURE — 77386: CPT | Performed by: RADIOLOGY

## 2021-03-11 ENCOUNTER — HOSPITAL ENCOUNTER (OUTPATIENT)
Dept: RADIATION ONCOLOGY | Facility: HOSPITAL | Age: 61
Discharge: HOME | End: 2021-03-11

## 2021-03-11 PROCEDURE — 77386: CPT | Performed by: RADIOLOGY

## 2021-03-12 ENCOUNTER — HOSPITAL ENCOUNTER (OUTPATIENT)
Dept: RADIATION ONCOLOGY | Facility: HOSPITAL | Age: 61
Discharge: HOME | End: 2021-03-12

## 2021-03-12 ENCOUNTER — DOCUMENTATION (OUTPATIENT)
Dept: SOCIAL WORK | Facility: HOSPITAL | Age: 61
End: 2021-03-12

## 2021-03-12 PROCEDURE — 77336 RADIATION PHYSICS CONSULT: CPT | Performed by: RADIOLOGY

## 2021-03-12 PROCEDURE — 77386: CPT | Performed by: RADIOLOGY
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
## 2021-03-15 ENCOUNTER — HOSPITAL ENCOUNTER (OUTPATIENT)
Dept: RADIATION ONCOLOGY | Facility: HOSPITAL | Age: 61
Discharge: HOME | End: 2021-03-15

## 2021-03-15 ENCOUNTER — TELEPHONE (OUTPATIENT)
Dept: RADIATION ONCOLOGY | Facility: HOSPITAL | Age: 61
End: 2021-03-15

## 2021-03-15 DIAGNOSIS — C34.31 SMALL CELL LUNG CANCER, RIGHT LOWER LOBE (HCC): Primary | ICD-10-CM

## 2021-03-15 PROCEDURE — 77386: CPT | Performed by: RADIOLOGY
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
## 2021-03-23 ENCOUNTER — BULK ORDERING (OUTPATIENT)
Dept: CASE MANAGEMENT | Facility: OTHER | Age: 61
End: 2021-03-23

## 2021-03-23 DIAGNOSIS — Z23 IMMUNIZATION DUE: ICD-10-CM

## 2021-03-24 ENCOUNTER — TELEPHONE (OUTPATIENT)
Dept: RADIATION ONCOLOGY | Facility: HOSPITAL | Age: 61
End: 2021-03-24

## 2021-03-30 ENCOUNTER — HOSPITAL ENCOUNTER (OUTPATIENT)
Dept: CT IMAGING | Facility: HOSPITAL | Age: 61
Discharge: HOME | End: 2021-03-30
Admitting: RADIOLOGY

## 2021-03-30 DIAGNOSIS — C34.31 SMALL CELL LUNG CANCER, RIGHT LOWER LOBE (HCC): ICD-10-CM

## 2021-03-30 PROCEDURE — 71260 CT THORAX DX C+: CPT

## 2021-03-30 PROCEDURE — 25010000002 IOPAMIDOL 61 % SOLUTION: Performed by: RADIOLOGY

## 2021-03-30 RX ADMIN — IOPAMIDOL 100 ML: 612 INJECTION, SOLUTION INTRAVENOUS at 06:57

## 2021-04-12 ENCOUNTER — OFFICE VISIT (OUTPATIENT)
Dept: RADIATION ONCOLOGY | Facility: HOSPITAL | Age: 61
End: 2021-04-12

## 2021-04-12 ENCOUNTER — OFFICE VISIT (OUTPATIENT)
Dept: ONCOLOGY | Facility: CLINIC | Age: 61
End: 2021-04-12

## 2021-04-12 ENCOUNTER — HOSPITAL ENCOUNTER (OUTPATIENT)
Dept: RADIATION ONCOLOGY | Facility: HOSPITAL | Age: 61
Setting detail: RADIATION/ONCOLOGY SERIES
Discharge: HOME | End: 2021-04-12

## 2021-04-12 VITALS
RESPIRATION RATE: 16 BRPM | HEART RATE: 109 BPM | HEIGHT: 65 IN | DIASTOLIC BLOOD PRESSURE: 86 MMHG | OXYGEN SATURATION: 98 % | SYSTOLIC BLOOD PRESSURE: 130 MMHG | TEMPERATURE: 96.9 F | WEIGHT: 146 LBS | BODY MASS INDEX: 24.32 KG/M2

## 2021-04-12 VITALS
SYSTOLIC BLOOD PRESSURE: 109 MMHG | TEMPERATURE: 98.2 F | OXYGEN SATURATION: 94 % | DIASTOLIC BLOOD PRESSURE: 62 MMHG | HEART RATE: 110 BPM | BODY MASS INDEX: 24.3 KG/M2 | WEIGHT: 146 LBS

## 2021-04-12 DIAGNOSIS — C34.31 SMALL CELL LUNG CANCER, RIGHT LOWER LOBE (HCC): Primary | ICD-10-CM

## 2021-04-12 DIAGNOSIS — G70.80: ICD-10-CM

## 2021-04-12 PROCEDURE — G0463 HOSPITAL OUTPT CLINIC VISIT: HCPCS

## 2021-04-12 PROCEDURE — 99214 OFFICE O/P EST MOD 30 MIN: CPT | Performed by: INTERNAL MEDICINE

## 2021-04-12 RX ORDER — ERGOCALCIFEROL 1.25 MG/1
CAPSULE ORAL
COMMUNITY
End: 2021-08-31

## 2021-04-12 RX ORDER — LEVALBUTEROL INHALATION SOLUTION 1.25 MG/3ML
SOLUTION RESPIRATORY (INHALATION)
COMMUNITY
End: 2022-01-12

## 2021-04-12 NOTE — PROGRESS NOTES
FOLLOW UP NOTE    PATIENT:                                                      Gayatri Kahn  MEDICAL RECORD #:                        [id number]  :                                                          1960  COMPLETION DATE:  3/15/2021  DIAGNOSIS:     Small cell lung cancer, right lower lobe (CMS/HCC)  - Stage IIB (cT1c, cN1, cM0)        BRIEF HISTORY:     Gayatri Kahn  is a very pleasant 60 y.o. female  who began to have some dizziness and weakness back in July.  This progressed the patient had an MRI scan of her brain and CT scan of her chest that showed a right hilar mass surrounding the right lower lobe bronchus and artery extending into the area of the subcarinal mediastinum.  Patient's MRI of the brain was normal.  The patient was then sent for bronchoscopy and EBUS first in [place] and then at .  Tissue was obtained from the right lymph nodes but was negative for evidence of malignancy back on 9/10/2020.  The patient then had serologies which were ultimately positive for AGN A1 antibodies which should have a 92% specificity for small cell lung cancer.  She received additional work-up of her head and neck in October to no avail.  This was repeated in 2020.  Patient was then sent for a PET scan 2021 that showed a mass at the right hilum with an SUV of 14 and no other lymphatic or distant disease in the chest.  Patient was referred to Dr. Irving's clinic to discuss possible options.  Patient was deemed to be too weak for chemotherapy patient was referred for palliative radiotherapy with hopes that she could potentially regain some function and strong enough for systemic therapy.  She had a negative MRI of the brain on 3/2/2021.    The patient was treated with radiation to the right hilum and complete treatment 3/15/2021.  The patient had a repeat CT scan of her chest on 3/30/2021 that showed good response to the right hilum with no obvious disease elsewhere.    She reports today  with no new symptoms.  She reports that sometimes he feels stronger.  She reports that she continues to have dizziness and double vision which has been her baseline for quite some time.    MEDICATIONS: Medication reconciliation for the patient was reviewed and confirmed in the electronic medical record.    Review of Systems:   Review of Systems   Respiratory: Positive for cough.    Musculoskeletal: Positive for back pain.   Psychiatric/Behavioral: Positive for depression. The patient is nervous/anxious.    A full 14 point review of systems was performed and was negative except as noted in the HPI.    KPS 70%    Physical Exam:   Physical Exam  Vitals and nursing note reviewed.   Constitutional:       Appearance: She is well-developed.      Comments: In wheelchair   HENT:      Head: Normocephalic and atraumatic.   Eyes:      Conjunctiva/sclera: Conjunctivae normal.      Pupils: Pupils are equal, round, and reactive to light.   Neck:      Comments: No obviously enlarged cervical or supraclavicular LAD.  Cardiovascular:      Comments: Patient well perfused. Non cyanotic. No prominent JVD. No pedal edema  Pulmonary:      Effort: Pulmonary effort is normal.      Breath sounds: Normal breath sounds. No stridor. No wheezing.   Abdominal:      General: There is no distension.      Palpations: Abdomen is soft.   Musculoskeletal:         General: Normal range of motion.      Cervical back: Normal range of motion and neck supple.      Comments: Patient moves all extremities spontaneously.    Skin:     General: Skin is warm and dry.   Neurological:      Mental Status: She is alert and oriented to person, place, and time.      Comments: Scanning speech  Coordination disrupted globally but worse in arms and legs   Psychiatric:         Behavior: Behavior normal.         Thought Content: Thought content normal.         Judgment: Judgment normal.            VITAL SIGNS:   Vitals:    04/12/21 1159   BP: 109/62   Pulse: 110   Temp:  98.2 °F (36.8 °C)   SpO2: 94%   Weight: 66.2 kg (146 lb)   PainSc:   7             Karnofsky score: 70     The following portions of the patient's history were reviewed and updated as appropriate: allergies, current medications, past family history, past medical history, past social history, past surgical history and problem list.            IMPRESSION:    Mrs. Kahn is a very pleasant 60-year-old female with that what does appear to be limited stage small cell lung cancer.  Her disease had been stable for quite some time.  She does have symptoms somewhat consistent with Lambert-Eaton but are also somewhat different as well.  The largest factor clinching her diagnosis at this time would be the serologies for AgNA 1.  The patient was treated with palliative radiotherapy to dose of 45 Gray in 15 fractions given her mobility and geographic/social disability.  The patient completed her treatments on 3/15/2021.  Her restaging scan 3/30/2021 showed good response of disease with no new disease clearly evident.  We discussed that is unlikely the patient was cured with this course of radiation however definitely help to consolidate the hilum and prevent further progression of disease in that area.  Help is also that this may hopefully decrease her antibody production and help her with her Lambert-Eaton.  At this time the patient is very weak debilitated.  She is not a candidate for chemotherapy per Dr. Irving  We discussed PCI and the role of it in small cell lung cancer.  Given her overall prognosis and status I would not proceed with PCI at this time and reserve whole brain radiation for the future.  I discussed this with the patient  and they agree.    RECOMMENDATIONS:    T1N1 right hilar limited stage small cell lung cancer, presumptive diagnosis  -Serologies positive for Lambert-Eaton syndrome Ag NA-1 antibody + does have a high specificity for small cell lung cancer              -would recommend repeating at the  end of treatments  -PET scan shows a mass at the right hilum with SUV of 14 which is highly suspicious for cancer  -Completed 45 Gray in 15 fractions to the right hilum and adjacent mediastinum on 30/15/21     Hypertension  -Continue present regimen     Hyper lipidemia  -Continue present regimen     Tobacco abuse  -Recommend cessation of possible           Owen Cardenas MD    Errors in dictation may reflect use of voice recognition software and not all errors in transcription may have been detected prior to signing.

## 2021-04-12 NOTE — PROGRESS NOTES
DATE OF VISIT: 4/12/2021    REASON FOR VISIT: Followup for non-small cell lung cancer     HISTORY OF PRESENT ILLNESS: The patient is a very pleasant 60 y.o. female  with past medical history significant for non-small cell lung cancer diagnosed January 2021.  She completed palliative course of radiation end of March 2021.  The  patient is here today for scheduled follow-up visit.    SUBJECTIVE: The patient is here today with her .  She is feeling slightly better since we have done radiation.  She still cannot walk.  She comes in her  for her daily activities.  Her breathing is good.  Anat has trended slightly better since we have done radiation.    PAST MEDICAL HISTORY/SOCIAL HISTORY/FAMILY HISTORY: Reviewed by me and unchanged from my documentation done on 04/12/21.    Review of Systems   Constitutional: Negative for activity change, appetite change, chills, fatigue, fever and unexpected weight change.   HENT: Negative for hearing loss, mouth sores, nosebleeds, sore throat and trouble swallowing.    Eyes: Negative for visual disturbance.   Respiratory: Negative for cough, chest tightness, shortness of breath and wheezing.    Cardiovascular: Negative for chest pain, palpitations and leg swelling.   Gastrointestinal: Negative for abdominal distention, abdominal pain, blood in stool, constipation, diarrhea, nausea, rectal pain and vomiting.   Endocrine: Negative for cold intolerance and heat intolerance.   Genitourinary: Negative for difficulty urinating, dysuria, frequency and urgency.   Musculoskeletal: Negative for arthralgias, back pain, gait problem, joint swelling and myalgias.   Skin: Negative for rash.   Neurological: Negative for dizziness, tremors, syncope, weakness, light-headedness, numbness and headaches.   Hematological: Negative for adenopathy. Does not bruise/bleed easily.   Psychiatric/Behavioral: Negative for confusion, sleep disturbance and suicidal ideas. The patient is not  "nervous/anxious.          Current Outpatient Medications:   •  aspirin 81 MG EC tablet, Take 81 mg by mouth Daily., Disp: , Rfl:   •  atorvastatin (LIPITOR) 20 MG tablet, Take 20 mg by mouth Daily., Disp: , Rfl:   •  ergocalciferol (ERGOCALCIFEROL) 1.25 MG (76844 UT) capsule, Vitamin D2 1,250 mcg (50,000 unit) capsule, Disp: , Rfl:   •  Firdapse 10 MG tablet, Take 10 mg by mouth 4 (Four) Times a Day., Disp: , Rfl:   •  levalbuterol (XOPENEX) 1.25 MG/3ML nebulizer solution, levalbuterol 1.25 mg/3 mL solution for nebulization, Disp: , Rfl:   •  levothyroxine (SYNTHROID, LEVOTHROID) 112 MCG tablet, Take 150 mcg by mouth Daily., Disp: , Rfl:   •  levothyroxine (SYNTHROID, LEVOTHROID) 150 MCG tablet, , Disp: , Rfl:   •  metoprolol succinate XL (TOPROL-XL) 25 MG 24 hr tablet, , Disp: , Rfl:   •  mycophenolate (CELLCEPT) 500 MG tablet, , Disp: , Rfl:   •  triamcinolone (KENALOG) 0.1 % cream, , Disp: , Rfl:     PHYSICAL EXAMINATION:   /86   Pulse 109   Temp 96.9 °F (36.1 °C) (Temporal)   Resp 16   Ht 165.1 cm (65\")   Wt 66.2 kg (146 lb) Comment: pt verbalized weight unable to stand  SpO2 98%   BMI 24.30 kg/m²    Pain Score    04/12/21 0906   PainSc:   6                     ECOG Performance Status: 1 - Symptomatic but completely ambulatory  General Appearance:  alert, cooperative, no apparent distress and appears stated age   Neurologic/Psychiatric: A&O x 3, gait steady, appropriate affect, strength 5/5 in all muscle groups   HEENT:  Normocephalic, without obvious abnormality, mucous membranes moist   Neck: Supple, symmetrical, trachea midline, no adenopathy;  No thyromegaly, masses, or tenderness   Lungs:   Clear to auscultation bilaterally; respirations regular, even, and unlabored bilaterally   Heart:  Regular rate and rhythm, no murmurs appreciated   Abdomen:   Soft, non-tender, non-distended and no organomegaly   Lymph nodes: No cervical, supraclavicular, inguinal or axillary adenopathy noted   Extremities: " Normal, atraumatic; no clubbing, cyanosis, or edema    Skin: No rashes, ulcers, or suspicious lesions noted     No visits with results within 2 Week(s) from this visit.   Latest known visit with results is:   Admission on 12/03/2020, Discharged on 12/03/2020   Component Date Value Ref Range Status   • WBC 12/03/2020 5.69  3.40 - 10.80 10*3/mm3 Final   • RBC 12/03/2020 4.47  3.77 - 5.28 10*6/mm3 Final   • Hemoglobin 12/03/2020 13.8  12.0 - 15.9 g/dL Final   • Hematocrit 12/03/2020 42.0  34.0 - 46.6 % Final   • MCV 12/03/2020 94.0  79.0 - 97.0 fL Final   • MCH 12/03/2020 30.9  26.6 - 33.0 pg Final   • MCHC 12/03/2020 32.9  31.5 - 35.7 g/dL Final   • RDW 12/03/2020 13.3  12.3 - 15.4 % Final   • RDW-SD 12/03/2020 46.0  37.0 - 54.0 fl Final   • MPV 12/03/2020 9.2  6.0 - 12.0 fL Final   • Platelets 12/03/2020 274  140 - 450 10*3/mm3 Final   • Neutrophil % 12/03/2020 53.8  42.7 - 76.0 % Final   • Lymphocyte % 12/03/2020 36.2  19.6 - 45.3 % Final   • Monocyte % 12/03/2020 5.6  5.0 - 12.0 % Final   • Eosinophil % 12/03/2020 2.6  0.3 - 6.2 % Final   • Basophil % 12/03/2020 1.8* 0.0 - 1.5 % Final   • Immature Grans % 12/03/2020 0.0  0.0 - 0.5 % Final   • Neutrophils, Absolute 12/03/2020 3.06  1.70 - 7.00 10*3/mm3 Final   • Lymphocytes, Absolute 12/03/2020 2.06  0.70 - 3.10 10*3/mm3 Final   • Monocytes, Absolute 12/03/2020 0.32  0.10 - 0.90 10*3/mm3 Final   • Eosinophils, Absolute 12/03/2020 0.15  0.00 - 0.40 10*3/mm3 Final   • Basophils, Absolute 12/03/2020 0.10  0.00 - 0.20 10*3/mm3 Final   • Immature Grans, Absolute 12/03/2020 0.00  0.00 - 0.05 10*3/mm3 Final   • nRBC 12/03/2020 0.0  0.0 - 0.2 /100 WBC Final   • Glucose 12/03/2020 153* 65 - 99 mg/dL Final   • BUN 12/03/2020 5* 6 - 20 mg/dL Final   • Creatinine 12/03/2020 0.72  0.57 - 1.00 mg/dL Final   • Sodium 12/03/2020 142  136 - 145 mmol/L Final   • Potassium 12/03/2020 3.8  3.5 - 5.2 mmol/L Final    Slight hemolysis detected by analyzer. Results may be affected.   •  Chloride 12/03/2020 101  98 - 107 mmol/L Final   • CO2 12/03/2020 32.0* 22.0 - 29.0 mmol/L Final   • Calcium 12/03/2020 10.5  8.6 - 10.5 mg/dL Final   • Total Protein 12/03/2020 7.0  6.0 - 8.5 g/dL Final   • Albumin 12/03/2020 4.00  3.50 - 5.20 g/dL Final   • ALT (SGPT) 12/03/2020 14  1 - 33 U/L Final   • AST (SGOT) 12/03/2020 16  1 - 32 U/L Final   • Alkaline Phosphatase 12/03/2020 104  39 - 117 U/L Final   • Total Bilirubin 12/03/2020 0.2  0.0 - 1.2 mg/dL Final   • eGFR Non African Amer 12/03/2020 83  >60 mL/min/1.73 Final   • Globulin 12/03/2020 3.0  gm/dL Final   • A/G Ratio 12/03/2020 1.3  g/dL Final   • BUN/Creatinine Ratio 12/03/2020 6.9* 7.0 - 25.0 Final   • Anion Gap 12/03/2020 9.0  5.0 - 15.0 mmol/L Final        CT Chest With Contrast Diagnostic    Result Date: 3/30/2021  Narrative: PROCEDURE: CT CHEST W CONTRAST DIAGNOSTIC-  HISTORY: Non-small cell lung cancer, monitor; C34.31-Malignant neoplasm of lower lobe, right bronchus or lung  COMPARISON: Outside CTs dated January 11, 2021 and August 17, 2020.  PROCEDURE: Multiple axial CT images were obtained from the thoracic inlet through the upper abdomen following the administration of intravenous contrast.  FINDINGS: Soft tissue windows demonstrate no adenopathy within the chest. The right pulmonary hilar mass seen on prior CT examinations has decreased significantly in size compared to the prior exam measuring approximately 1.4 x 0.8 cm and previously measuring up to 2.6 x 2.3 cm. The heart is normal in size. The aorta is normal in caliber.There is no pericardial or pleural effusion. Lung windows reveal centrilobular emphysema. Subpleural nodules within both lungs are unchanged. A noncalcified nodule measuring 5 mm in the right middle lobe on image 45 is unchanged. No new or enlarging pulmonary nodules are identified.  The visualized upper abdomen is unremarkable. Bone windows reveal no acute osseous abnormalities.      Impression: Interval decrease in size  of the patient's right pulmonary hilar mass which measures approximately 1.4 x 0.8 cm on today's exam. No new or enlarging nodules/masses are seen within the chest.   This study was performed with techniques to keep radiation doses as low as reasonably achievable (ALARA). Individualized dose reduction techniques using automated exposure control or adjustment of mA and/or kV according to the patient size were employed.  This report was finalized on 3/30/2021 10:24 AM by Shanon Ramirez M.D..      ASSESSMENT: The patient is a very pleasant 60 y.o. female  with right small cell lung cancer    PROBLEM LIST:   1.  Right lower lobe small cell lung cancer T1 N1 M0 stage IIb:  A.  Presented with ataxia and lower extremities weakness  B.  Negative bronchoscopy August 19, 2020 and endobronchial ultrasound September 9, 2020  C.  CT chest done December 7, 2020 revealed 2.7 cm right hilar mass  D.  Whole-body PET scan done January 11, 2020 revealed isolated hypermetabolic activity at the right hilar mass SUV of 12  E. positive serology for AGN A-1 antibody which is 92% predicted for small cell lung cancer, positive N type calcium channel blocker and P/Q type calcium channel blooker.  F.  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  Hypertension  4.  Hypercholesteremia    PLAN:  1.  I did go over the scan results with the patient I reviewed the films myself I reassured the patient she had good response to radiation with decrease in size of the right hilar mass from 2.6 to 1.4 cm.  2.  The patient still not strong enough for chemotherapy.  3.  I will continue watchful waiting at this point.  4.  She will follow-up with me in 3 months [place] office for  5.  RP the patient scans prior to return if it remains stable continue watchful waiting if she has evidence of progression will consider doing biopsy to confirm diagnosis if she is interested  in treatment and then start her on palliative chemotherapy.  6.  She will continue Firdapse for Eaton-Lambert syndrome.  7.  We will continue to monitor patient blood pressure.    Susi Irving MD  4/12/2021

## 2021-06-02 ENCOUNTER — HOSPITAL ENCOUNTER (EMERGENCY)
Dept: HOSPITAL 79 - ER1 | Age: 61
LOS: 1 days | Discharge: HOME | End: 2021-06-03
Payer: MEDICARE

## 2021-06-02 DIAGNOSIS — Z85.118: ICD-10-CM
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
## 2021-06-02 DIAGNOSIS — S42.021A: Primary | ICD-10-CM
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
## 2021-06-02 DIAGNOSIS — F17.210: ICD-10-CM

## 2021-07-10 ENCOUNTER — HOSPITAL ENCOUNTER (EMERGENCY)
Facility: HOSPITAL | Age: 61
Discharge: HOME | End: 2021-07-11
Attending: EMERGENCY MEDICINE | Admitting: EMERGENCY MEDICINE

## 2021-07-10 ENCOUNTER — APPOINTMENT (OUTPATIENT)
Dept: GENERAL RADIOLOGY | Facility: HOSPITAL | Age: 61
End: 2021-07-10

## 2021-07-10 ENCOUNTER — APPOINTMENT (OUTPATIENT)
Dept: CT IMAGING | Facility: HOSPITAL | Age: 61
End: 2021-07-10

## 2021-07-10 DIAGNOSIS — R42 DIZZINESS: ICD-10-CM

## 2021-07-10 DIAGNOSIS — I26.99 OTHER ACUTE PULMONARY EMBOLISM WITHOUT ACUTE COR PULMONALE (HCC): Primary | ICD-10-CM

## 2021-07-10 LAB
ALBUMIN SERPL-MCNC: 4.2 G/DL (ref 3.5–5.2)
ALBUMIN/GLOB SERPL: 1.4 G/DL
ALP SERPL-CCNC: 125 U/L (ref 39–117)
ALT SERPL W P-5'-P-CCNC: 18 U/L (ref 1–33)
ANION GAP SERPL CALCULATED.3IONS-SCNC: 8.5 MMOL/L (ref 5–15)
AST SERPL-CCNC: 21 U/L (ref 1–32)
BASOPHILS # BLD AUTO: 0.08 10*3/MM3 (ref 0–0.2)
BASOPHILS NFR BLD AUTO: 1.3 % (ref 0–1.5)
BILIRUB SERPL-MCNC: 0.2 MG/DL (ref 0–1.2)
BUN SERPL-MCNC: 7 MG/DL (ref 8–23)
BUN/CREAT SERPL: 10 (ref 7–25)
CALCIUM SPEC-SCNC: 10 MG/DL (ref 8.6–10.5)
CHLORIDE SERPL-SCNC: 104 MMOL/L (ref 98–107)
CO2 SERPL-SCNC: 30.5 MMOL/L (ref 22–29)
CREAT SERPL-MCNC: 0.7 MG/DL (ref 0.57–1)
DEPRECATED RDW RBC AUTO: 43.8 FL (ref 37–54)
EOSINOPHIL # BLD AUTO: 0.15 10*3/MM3 (ref 0–0.4)
EOSINOPHIL NFR BLD AUTO: 2.4 % (ref 0.3–6.2)
ERYTHROCYTE [DISTWIDTH] IN BLOOD BY AUTOMATED COUNT: 12.7 % (ref 12.3–15.4)
GFR SERPL CREATININE-BSD FRML MDRD: 85 ML/MIN/1.73
GLOBULIN UR ELPH-MCNC: 3 GM/DL
GLUCOSE SERPL-MCNC: 112 MG/DL (ref 65–99)
HCT VFR BLD AUTO: 43.7 % (ref 34–46.6)
HGB BLD-MCNC: 14.4 G/DL (ref 12–15.9)
HOLD SPECIMEN: NORMAL
IMM GRANULOCYTES # BLD AUTO: 0.01 10*3/MM3 (ref 0–0.05)
IMM GRANULOCYTES NFR BLD AUTO: 0.2 % (ref 0–0.5)
LYMPHOCYTES # BLD AUTO: 1.4 10*3/MM3 (ref 0.7–3.1)
LYMPHOCYTES NFR BLD AUTO: 22.4 % (ref 19.6–45.3)
MCH RBC QN AUTO: 30.8 PG (ref 26.6–33)
MCHC RBC AUTO-ENTMCNC: 33 G/DL (ref 31.5–35.7)
MCV RBC AUTO: 93.6 FL (ref 79–97)
MONOCYTES # BLD AUTO: 0.46 10*3/MM3 (ref 0.1–0.9)
MONOCYTES NFR BLD AUTO: 7.4 % (ref 5–12)
NEUTROPHILS NFR BLD AUTO: 4.15 10*3/MM3 (ref 1.7–7)
NEUTROPHILS NFR BLD AUTO: 66.3 % (ref 42.7–76)
NRBC BLD AUTO-RTO: 0 /100 WBC (ref 0–0.2)
NT-PROBNP SERPL-MCNC: 307.6 PG/ML (ref 0–900)
PLATELET # BLD AUTO: 281 10*3/MM3 (ref 140–450)
PMV BLD AUTO: 9.1 FL (ref 6–12)
POTASSIUM SERPL-SCNC: 4.4 MMOL/L (ref 3.5–5.2)
PROT SERPL-MCNC: 7.2 G/DL (ref 6–8.5)
RBC # BLD AUTO: 4.67 10*6/MM3 (ref 3.77–5.28)
SODIUM SERPL-SCNC: 143 MMOL/L (ref 136–145)
TROPONIN T SERPL-MCNC: <0.01 NG/ML (ref 0–0.03)
WBC # BLD AUTO: 6.25 10*3/MM3 (ref 3.4–10.8)
WHOLE BLOOD HOLD SPECIMEN: NORMAL

## 2021-07-10 PROCEDURE — 96374 THER/PROPH/DIAG INJ IV PUSH: CPT

## 2021-07-10 PROCEDURE — 93005 ELECTROCARDIOGRAM TRACING: CPT | Performed by: EMERGENCY MEDICINE

## 2021-07-10 PROCEDURE — 99283 EMERGENCY DEPT VISIT LOW MDM: CPT

## 2021-07-10 PROCEDURE — 71275 CT ANGIOGRAPHY CHEST: CPT

## 2021-07-10 PROCEDURE — 83880 ASSAY OF NATRIURETIC PEPTIDE: CPT | Performed by: EMERGENCY MEDICINE

## 2021-07-10 PROCEDURE — 25010000002 IOPAMIDOL 61 % SOLUTION: Performed by: EMERGENCY MEDICINE

## 2021-07-10 PROCEDURE — 71045 X-RAY EXAM CHEST 1 VIEW: CPT

## 2021-07-10 PROCEDURE — 80053 COMPREHEN METABOLIC PANEL: CPT | Performed by: EMERGENCY MEDICINE

## 2021-07-10 PROCEDURE — 96361 HYDRATE IV INFUSION ADD-ON: CPT

## 2021-07-10 PROCEDURE — 85025 COMPLETE CBC W/AUTO DIFF WBC: CPT | Performed by: EMERGENCY MEDICINE

## 2021-07-10 PROCEDURE — 84484 ASSAY OF TROPONIN QUANT: CPT | Performed by: EMERGENCY MEDICINE

## 2021-07-10 RX ORDER — METOPROLOL TARTRATE 5 MG/5ML
5 INJECTION INTRAVENOUS ONCE
Status: COMPLETED | OUTPATIENT
Start: 2021-07-10 | End: 2021-07-10

## 2021-07-10 RX ORDER — SODIUM CHLORIDE 0.9 % (FLUSH) 0.9 %
10 SYRINGE (ML) INJECTION AS NEEDED
Status: DISCONTINUED | OUTPATIENT
Start: 2021-07-10 | End: 2021-07-11 | Stop reason: HOSPADM

## 2021-07-10 RX ADMIN — SODIUM CHLORIDE 500 ML: 9 INJECTION, SOLUTION INTRAVENOUS at 23:34

## 2021-07-10 RX ADMIN — IOPAMIDOL 100 ML: 612 INJECTION, SOLUTION INTRAVENOUS at 22:32

## 2021-07-10 RX ADMIN — METOPROLOL TARTRATE 5 MG: 1 INJECTION, SOLUTION INTRAVENOUS at 23:35

## 2021-07-10 RX ADMIN — SODIUM CHLORIDE 1000 ML: 9 INJECTION, SOLUTION INTRAVENOUS at 22:12

## 2021-07-11 VITALS
BODY MASS INDEX: 23.46 KG/M2 | HEIGHT: 66 IN | SYSTOLIC BLOOD PRESSURE: 125 MMHG | WEIGHT: 146 LBS | DIASTOLIC BLOOD PRESSURE: 87 MMHG | RESPIRATION RATE: 18 BRPM | OXYGEN SATURATION: 96 % | HEART RATE: 105 BPM | TEMPERATURE: 98 F

## 2021-07-11 RX ORDER — APIXABAN 5 MG (74)
5 KIT ORAL TAKE AS DIRECTED
Qty: 74 TABLET | Refills: 0 | Status: SHIPPED | OUTPATIENT
Start: 2021-07-11 | End: 2022-01-12

## 2021-07-11 NOTE — ED PROVIDER NOTES
Subjective   61-year-old female presents to the ED with a chief complaint of dizziness and shortness of breath.  The patient has had dizziness since her diagnosis of lung cancer.  She has finished her radiation for lung cancer and has had a significant work-up for this dizziness associated with her cancer and they think there is a neurological component but she does not have any masses or anything and is had multiple scans of her brain.  She states that her dizziness is like the entire room is spinning.  It happens at rest.  She denies chest pain.  She does admit to being short of breath.  She has a cough.  Cough is not productive of sputum.  No fever chills.  No nausea vomiting diarrhea abdominal pain.  No recent illnesses.  No other complaints at this time.          Review of Systems   Constitutional: Negative for fatigue and fever.   Respiratory: Positive for shortness of breath.    Neurological: Positive for dizziness.   All other systems reviewed and are negative.      Past Medical History:   Diagnosis Date   • Arthritis    • Heart disease    • Hyperlipidemia    • Lambert-Eaton syndrome (CMS/HCC)    • Lung cancer (CMS/HCC)        Allergies   Allergen Reactions   • Erythromycin Nausea Only       Past Surgical History:   Procedure Laterality Date   • FOOT SURGERY     • HYSTERECTOMY     • KNEE SURGERY         Family History   Problem Relation Age of Onset   • Heart disease Mother    • Kidney disease Mother    • Brain cancer Father    • Heart disease Sister    • Breast cancer Sister    • Heart disease Brother        Social History     Socioeconomic History   • Marital status:      Spouse name: Not on file   • Number of children: Not on file   • Years of education: Not on file   • Highest education level: Not on file   Tobacco Use   • Smoking status: Current Every Day Smoker     Packs/day: 1.00     Years: 15.00     Pack years: 15.00     Types: Cigarettes   • Smokeless tobacco: Never Used   Substance and  Sexual Activity   • Alcohol use: No   • Drug use: No           Objective   Physical Exam  Vitals and nursing note reviewed.   Constitutional:       General: She is not in acute distress.     Appearance: She is not diaphoretic.      Comments: Chronically ill-appearing   HENT:      Head: Normocephalic and atraumatic.      Nose: Nose normal.   Eyes:      Conjunctiva/sclera: Conjunctivae normal.   Cardiovascular:      Rate and Rhythm: Regular rhythm. Tachycardia present.   Pulmonary:      Effort: Tachypnea present. No respiratory distress.      Comments: Coarse breath sounds bilaterally.  Coarse cough noted.  Mild tachypnea.  Abdominal:      General: There is no distension.      Palpations: Abdomen is soft.      Tenderness: There is no abdominal tenderness. There is no guarding.   Musculoskeletal:         General: No deformity.      Comments: Trace edema bilateral lower extremities   Neurological:      Mental Status: She is alert and oriented to person, place, and time.      Cranial Nerves: No cranial nerve deficit.         Procedures           ED Course  ED Course as of Jul 11 0639   Sat Jul 10, 2021   2202 EKG interpreted by me.  Sinus rhythm.  Tachycardic.  Rate of 123.  Shortened PA interval.  Diffuse ST and T wave abnormalities.  Abnormal EKG    [CG]   2331 Pt has not had metoprolol or other meds today    [CG]      ED Course User Index  [CG] Clyde Piper B, DO                                           MDM  Patient presented to the ED complaining of some dizziness and shortness of breath.  Apparently the dizziness has been ongoing for quite some time.  She has had previous negative imaging of her brain related to her lung cancer and possible metastasis.  Labs were relatively reassuring without significant acute abnormality.  Did obtain a CT PE secondary to some tachycardia although later found out she had not taken her medications today.  CT shows a tiny nonobstructing pulmonary emboli we'll start on anticoagulants  for this.  Patient is appropriate for discharge follow-up with her oncologist as she has appointment in 2 to 3 days.  They can repeat imaging of her brain at that time if needed.      Final diagnoses:   Other acute pulmonary embolism without acute cor pulmonale (CMS/HCC)   Dizziness       ED Disposition  ED Disposition     ED Disposition Condition Comment    Discharge Stable           Jaqueline Jean Isadora, APRN  [address] [address] [id number] [phone]               Medication List      New Prescriptions    Eliquis DVT/PE Starter Pack tablet therapy pack  Generic drug: Apixaban Starter Pack  Take two 5 mg tablets by mouth every 12 hours for 7 days. Followed by one 5 mg tablet every 12 hours. (Dispense starter pack if available)           Where to Get Your Medications      These medications were sent to [facility] [place], [place] - [address] [address] - [phone]  - [phone]   [address] [address] [address]    Phone: [phone]   · Eliquis DVT/PE Starter Pack tablet therapy pack          Clyde Piper,   07/11/21 0639

## 2021-07-13 NOTE — PROGRESS NOTES
DATE OF VISIT: 7/14/2021    REASON FOR VISIT: Followup for non-small cell lung cancer     HISTORY OF PRESENT ILLNESS: The patient is a very pleasant 61 y.o. female  with past medical history significant for non-small cell lung cancer diagnosed January 2021.  She completed palliative course of radiation end of March 2021.  The  patient is here today for scheduled follow-up visit.    SUBJECTIVE: The patient is here today with her .  She is doing fair.  She still cannot walk secondary to dizziness and weakness next lower extremities.  Her breathing is stable denies any fever or chills.    PAST MEDICAL HISTORY/SOCIAL HISTORY/FAMILY HISTORY: Reviewed by me and unchanged from my documentation done on 07/14/21.    Review of Systems   Constitutional: Negative for activity change, appetite change, chills, fatigue, fever and unexpected weight change.   HENT: Negative for hearing loss, mouth sores, nosebleeds, sore throat and trouble swallowing.    Eyes: Negative for visual disturbance.   Respiratory: Negative for cough, chest tightness, shortness of breath and wheezing.    Cardiovascular: Negative for chest pain, palpitations and leg swelling.   Gastrointestinal: Negative for abdominal distention, abdominal pain, blood in stool, constipation, diarrhea, nausea, rectal pain and vomiting.   Endocrine: Negative for cold intolerance and heat intolerance.   Genitourinary: Negative for difficulty urinating, dysuria, frequency and urgency.   Musculoskeletal: Negative for arthralgias, back pain, gait problem, joint swelling and myalgias.   Skin: Negative for rash.   Neurological: Negative for dizziness, tremors, syncope, weakness, light-headedness, numbness and headaches.   Hematological: Negative for adenopathy. Does not bruise/bleed easily.   Psychiatric/Behavioral: Negative for confusion, sleep disturbance and suicidal ideas. The patient is not nervous/anxious.          Current Outpatient Medications:   •  Apixaban Starter  "Pack (Eliquis DVT/PE Starter Pack) tablet therapy pack, Take two 5 mg tablets by mouth every 12 hours for 7 days. Followed by one 5 mg tablet every 12 hours. (Dispense starter pack if available), Disp: 74 tablet, Rfl: 0  •  aspirin 81 MG EC tablet, Take 81 mg by mouth Daily., Disp: , Rfl:   •  atorvastatin (LIPITOR) 20 MG tablet, Take 20 mg by mouth Daily., Disp: , Rfl:   •  carvedilol (COREG) 25 MG tablet, , Disp: , Rfl:   •  ergocalciferol (ERGOCALCIFEROL) 1.25 MG (48792 UT) capsule, Vitamin D2 1,250 mcg (50,000 unit) capsule, Disp: , Rfl:   •  Firdapse 10 MG tablet, Take 10 mg by mouth 4 (Four) Times a Day., Disp: , Rfl:   •  HYDROcodone-acetaminophen (NORCO) 5-325 MG per tablet, , Disp: , Rfl:   •  levalbuterol (XOPENEX) 1.25 MG/3ML nebulizer solution, levalbuterol 1.25 mg/3 mL solution for nebulization, Disp: , Rfl:   •  levothyroxine (SYNTHROID, LEVOTHROID) 150 MCG tablet, , Disp: , Rfl:   •  lisinopril (PRINIVIL,ZESTRIL) 5 MG tablet, , Disp: , Rfl:   •  methocarbamol (ROBAXIN) 500 MG tablet, , Disp: , Rfl:   •  metoprolol succinate XL (TOPROL-XL) 25 MG 24 hr tablet, , Disp: , Rfl:   •  mycophenolate (CELLCEPT) 500 MG tablet, , Disp: , Rfl:   •  spironolactone (ALDACTONE) 25 MG tablet, , Disp: , Rfl:   •  triamcinolone (KENALOG) 0.1 % cream, , Disp: , Rfl:     PHYSICAL EXAMINATION:   /74   Pulse 113   Temp 97.3 °F (36.3 °C) (Temporal)   Resp 18   Ht 167.6 cm (65.98\")   Wt 73.5 kg (162 lb)   SpO2 97%   BMI 26.16 kg/m²    Pain Score    07/14/21 1013   PainSc: 0-No pain                     ECOG Performance Status: 1 - Symptomatic but completely ambulatory  General Appearance:  alert, cooperative, no apparent distress and appears stated age   Neurologic/Psychiatric: A&O x 3, gait steady, appropriate affect, strength 5/5 in all muscle groups   HEENT:  Normocephalic, without obvious abnormality, mucous membranes moist   Neck: Supple, symmetrical, trachea midline, no adenopathy;  No thyromegaly, masses, " or tenderness   Lungs:   Clear to auscultation bilaterally; respirations regular, even, and unlabored bilaterally   Heart:  Regular rate and rhythm, no murmurs appreciated   Abdomen:   Soft, non-tender, non-distended and no organomegaly   Lymph nodes: No cervical, supraclavicular, inguinal or axillary adenopathy noted   Extremities: Normal, atraumatic; no clubbing, cyanosis, or edema    Skin: No rashes, ulcers, or suspicious lesions noted     Admission on 07/10/2021, Discharged on 07/11/2021   Component Date Value Ref Range Status   • Glucose 07/10/2021 112* 65 - 99 mg/dL Final   • BUN 07/10/2021 7* 8 - 23 mg/dL Final   • Creatinine 07/10/2021 0.70  0.57 - 1.00 mg/dL Final   • Sodium 07/10/2021 143  136 - 145 mmol/L Final   • Potassium 07/10/2021 4.4  3.5 - 5.2 mmol/L Final   • Chloride 07/10/2021 104  98 - 107 mmol/L Final   • CO2 07/10/2021 30.5* 22.0 - 29.0 mmol/L Final   • Calcium 07/10/2021 10.0  8.6 - 10.5 mg/dL Final   • Total Protein 07/10/2021 7.2  6.0 - 8.5 g/dL Final   • Albumin 07/10/2021 4.20  3.50 - 5.20 g/dL Final   • ALT (SGPT) 07/10/2021 18  1 - 33 U/L Final   • AST (SGOT) 07/10/2021 21  1 - 32 U/L Final   • Alkaline Phosphatase 07/10/2021 125* 39 - 117 U/L Final   • Total Bilirubin 07/10/2021 0.2  0.0 - 1.2 mg/dL Final   • eGFR Non African Amer 07/10/2021 85  >60 mL/min/1.73 Final   • Globulin 07/10/2021 3.0  gm/dL Final   • A/G Ratio 07/10/2021 1.4  g/dL Final   • BUN/Creatinine Ratio 07/10/2021 10.0  7.0 - 25.0 Final   • Anion Gap 07/10/2021 8.5  5.0 - 15.0 mmol/L Final   • proBNP 07/10/2021 307.6  0.0 - 900.0 pg/mL Final   • Troponin T 07/10/2021 <0.010  0.000 - 0.030 ng/mL Final   • Extra Tube 07/10/2021 Hold for add-ons.   Final    Auto resulted.   • Extra Tube 07/10/2021 hold for add-on   Final    Auto resulted   • Extra Tube 07/10/2021 Hold for add-ons.   Final    Auto resulted.   • Extra Tube 07/10/2021 Hold for add-ons.   Final    Auto resulted.   • WBC 07/10/2021 6.25  3.40 - 10.80  10*3/mm3 Final   • RBC 07/10/2021 4.67  3.77 - 5.28 10*6/mm3 Final   • Hemoglobin 07/10/2021 14.4  12.0 - 15.9 g/dL Final   • Hematocrit 07/10/2021 43.7  34.0 - 46.6 % Final   • MCV 07/10/2021 93.6  79.0 - 97.0 fL Final   • MCH 07/10/2021 30.8  26.6 - 33.0 pg Final   • MCHC 07/10/2021 33.0  31.5 - 35.7 g/dL Final   • RDW 07/10/2021 12.7  12.3 - 15.4 % Final   • RDW-SD 07/10/2021 43.8  37.0 - 54.0 fl Final   • MPV 07/10/2021 9.1  6.0 - 12.0 fL Final   • Platelets 07/10/2021 281  140 - 450 10*3/mm3 Final   • Neutrophil % 07/10/2021 66.3  42.7 - 76.0 % Final   • Lymphocyte % 07/10/2021 22.4  19.6 - 45.3 % Final   • Monocyte % 07/10/2021 7.4  5.0 - 12.0 % Final   • Eosinophil % 07/10/2021 2.4  0.3 - 6.2 % Final   • Basophil % 07/10/2021 1.3  0.0 - 1.5 % Final   • Immature Grans % 07/10/2021 0.2  0.0 - 0.5 % Final   • Neutrophils, Absolute 07/10/2021 4.15  1.70 - 7.00 10*3/mm3 Final   • Lymphocytes, Absolute 07/10/2021 1.40  0.70 - 3.10 10*3/mm3 Final   • Monocytes, Absolute 07/10/2021 0.46  0.10 - 0.90 10*3/mm3 Final   • Eosinophils, Absolute 07/10/2021 0.15  0.00 - 0.40 10*3/mm3 Final   • Basophils, Absolute 07/10/2021 0.08  0.00 - 0.20 10*3/mm3 Final   • Immature Grans, Absolute 07/10/2021 0.01  0.00 - 0.05 10*3/mm3 Final   • nRBC 07/10/2021 0.0  0.0 - 0.2 /100 WBC Final        XR Chest 1 View    Result Date: 7/11/2021  Narrative: PROCEDURE: XR CHEST 1 VW-  HISTORY: [facility] Triage Protocol  COMPARISON: 10/10/2020.  FINDINGS: The heart is normal in size. The mediastinum is unremarkable. Chronic appearing interstitial changes, no focal consolidation. There is evidence of calcified granulomatous disease.. There is no pneumothorax. There are no acute osseous abnormalities.      Impression: No acute cardiopulmonary process.  Continued followup is recommended.  This report was finalized on 7/11/2021 8:28 AM by Danny Calixto DO.    CT Chest Pulmonary Embolism    Result Date: 7/10/2021  Narrative: FINAL REPORT TECHNIQUE: Multiple  axial CT images were obtained through the chest following IV contrast using a CTA/PE protocol.  3D/MIP reconstruction images were also performed. This study was performed with techniques to keep radiation doses as low as reasonably achievable (ALARA). Individualized dose reduction techniques using automated exposure control or adjustment of mA and/or kV according to the patient's size were employed. CLINICAL HISTORY: Possible pulmonary embolism, tachycardia, hypoxia FINDINGS: PAs and aorta: There is a tiny nonocclusive subsegmental pulmonary embolus in the right lower lobe.  Thoracic aorta is unremarkable.  No significant coronary artery calcifications. Heart/mediastinum: No evidence for right heart strain.  No pericardial effusion.  The heart is normal in size.  Lungs: Mild atelectasis.  Otherwise the lungs are clear.  Lymph nodes: No pathologically enlarged thoracic lymph nodes.  Pleura: No pneumothorax or pleural effusion.  Chest Wall: No chest wall contusion.  Bones: There is a subacute healing fracture of the right clavicle.  Upper abdomen: No acute findings in the upper abdomen.     Impression: Tiny nonocclusive subsegmental pulmonary embolus in the right lower lobe of questionable clinical significance.  Healing right clavicle fracture. Authenticated by Nahum Villafuerte MD on 07/10/2021 11:16:13 PM      ASSESSMENT: The patient is a very pleasant 61 y.o. female  with right small cell lung cancer    PROBLEM LIST:   1.  Right lower lobe small cell lung cancer T1 N1 M0 stage IIb:  A.  Presented with ataxia and lower extremities weakness  B.  Negative bronchoscopy August 19, 2020 and endobronchial ultrasound September 9, 2020  C.  CT chest done December 7, 2020 revealed 2.7 cm right hilar mass  D.  Whole-body PET scan done January 11, 2020 revealed isolated hypermetabolic activity at the right hilar mass SUV of 12  E. positive serology for AGN A-1 antibody which is 92% predicted for small cell lung cancer, positive N  type calcium channel blocker and P/Q type calcium channel blooker.  F.  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  Hypertension  4.  Hypercholesteremia    PLAN:  1.  I did go over the scan results with the patient and her .  Continues improvement without any evidence of new or progressive disease.  2.  The patient still not strong enough for chemotherapy.  3.  I will continue watchful waiting at this point.  4.  She will follow-up with me in 3 months.  5.  I will repeat the patient scans prior to return if it remains stable continue watchful waiting if she has evidence of progression will consider doing biopsy to confirm diagnosis if she is interested in treatment and then start her on palliative chemotherapy.  6.  She will continue Firdapse for Eaton-Lambert syndrome.  7.  We will continue to monitor patient blood pressure.    Susi Irving MD  7/14/2021

## 2021-07-14 ENCOUNTER — OFFICE VISIT (OUTPATIENT)
Dept: ONCOLOGY | Facility: CLINIC | Age: 61
End: 2021-07-14

## 2021-07-14 VITALS
HEART RATE: 113 BPM | DIASTOLIC BLOOD PRESSURE: 74 MMHG | RESPIRATION RATE: 18 BRPM | OXYGEN SATURATION: 97 % | BODY MASS INDEX: 26.03 KG/M2 | WEIGHT: 162 LBS | HEIGHT: 66 IN | SYSTOLIC BLOOD PRESSURE: 119 MMHG | TEMPERATURE: 97.3 F

## 2021-07-14 DIAGNOSIS — C34.31 SMALL CELL LUNG CANCER, RIGHT LOWER LOBE (HCC): Primary | ICD-10-CM

## 2021-07-14 PROCEDURE — 99214 OFFICE O/P EST MOD 30 MIN: CPT | Performed by: INTERNAL MEDICINE

## 2021-07-14 RX ORDER — HYDROCODONE BITARTRATE AND ACETAMINOPHEN 5; 325 MG/1; MG/1
TABLET ORAL
COMMUNITY
Start: 2021-06-03 | End: 2021-08-31

## 2021-07-14 RX ORDER — CARVEDILOL 25 MG/1
TABLET ORAL
COMMUNITY
Start: 2021-07-07 | End: 2022-01-12

## 2021-07-14 RX ORDER — LISINOPRIL 5 MG/1
TABLET ORAL
COMMUNITY
Start: 2021-07-07 | End: 2022-01-12

## 2021-07-14 RX ORDER — SPIRONOLACTONE 25 MG/1
TABLET ORAL
COMMUNITY
Start: 2021-07-07 | End: 2022-01-12

## 2021-07-14 RX ORDER — METHOCARBAMOL 500 MG/1
TABLET, FILM COATED ORAL
COMMUNITY
Start: 2021-06-04 | End: 2022-01-12

## 2021-08-30 ENCOUNTER — TELEPHONE (OUTPATIENT)
Dept: ONCOLOGY | Facility: CLINIC | Age: 61
End: 2021-08-30

## 2021-08-30 NOTE — TELEPHONE ENCOUNTER
Provider:SUSAN    Caller:SARA    Relationship to Patient: DAUGHTER    Phone Number: [phone]    Reason for Call: PATIENT'S DAUGHTER SARA CALLED TO SCHEDULE APPOINTMENT AT THE ADVISE OF PATIENT'S NEUROLOGIST. PATIENT ALSO HAS 3 MONTH F/U 10/13/21. PATIENT ALSO HAS NO ENERGY AND IS NOT EATING SAYS DAUGHTER SARA

## 2021-08-31 ENCOUNTER — OFFICE VISIT (OUTPATIENT)
Dept: ONCOLOGY | Facility: CLINIC | Age: 61
End: 2021-08-31

## 2021-08-31 VITALS
OXYGEN SATURATION: 91 % | TEMPERATURE: 97.5 F | BODY MASS INDEX: 26.68 KG/M2 | RESPIRATION RATE: 20 BRPM | WEIGHT: 166 LBS | HEART RATE: 102 BPM | DIASTOLIC BLOOD PRESSURE: 70 MMHG | SYSTOLIC BLOOD PRESSURE: 102 MMHG | HEIGHT: 66 IN

## 2021-08-31 DIAGNOSIS — G70.80: ICD-10-CM

## 2021-08-31 DIAGNOSIS — C34.31 SMALL CELL LUNG CANCER, RIGHT LOWER LOBE (HCC): Primary | ICD-10-CM

## 2021-08-31 PROCEDURE — 99214 OFFICE O/P EST MOD 30 MIN: CPT | Performed by: INTERNAL MEDICINE

## 2021-08-31 NOTE — PROGRESS NOTES
DATE OF VISIT: 8/31/2021    REASON FOR VISIT: Followup for possible lung cancer     HISTORY OF PRESENT ILLNESS: The patient is a very pleasant 61 y.o. female  with past medical history significant for possible lung cancer diagnosed January 2021 based on neurologic symptoms as well as serology but no confirmed histology.  She completed palliative course of radiation end of March 2021.  The  patient is here today for scheduled follow-up visit.    SUBJECTIVE: The patient is here today with her .  They are requesting a referral for neurology in [place] since will be easier on them.  Her breathing is stable.  She continues to complain of fatigue.    PAST MEDICAL HISTORY/SOCIAL HISTORY/FAMILY HISTORY: Reviewed by me and unchanged from my documentation done on 08/31/21.    Review of Systems   Constitutional: Negative for activity change, appetite change, chills, fatigue, fever and unexpected weight change.   HENT: Negative for hearing loss, mouth sores, nosebleeds, sore throat and trouble swallowing.    Eyes: Negative for visual disturbance.   Respiratory: Negative for cough, chest tightness, shortness of breath and wheezing.    Cardiovascular: Negative for chest pain, palpitations and leg swelling.   Gastrointestinal: Negative for abdominal distention, abdominal pain, blood in stool, constipation, diarrhea, nausea, rectal pain and vomiting.   Endocrine: Negative for cold intolerance and heat intolerance.   Genitourinary: Negative for difficulty urinating, dysuria, frequency and urgency.   Musculoskeletal: Negative for arthralgias, back pain, gait problem, joint swelling and myalgias.   Skin: Negative for rash.   Neurological: Negative for dizziness, tremors, syncope, weakness, light-headedness, numbness and headaches.   Hematological: Negative for adenopathy. Does not bruise/bleed easily.   Psychiatric/Behavioral: Negative for confusion, sleep disturbance and suicidal ideas. The patient is not nervous/anxious.   "        Current Outpatient Medications:   •  Apixaban Starter Pack (Eliquis DVT/PE Starter Pack) tablet therapy pack, Take two 5 mg tablets by mouth every 12 hours for 7 days. Followed by one 5 mg tablet every 12 hours. (Dispense starter pack if available), Disp: 74 tablet, Rfl: 0  •  aspirin 81 MG EC tablet, Take 81 mg by mouth Daily., Disp: , Rfl:   •  atorvastatin (LIPITOR) 20 MG tablet, Take 20 mg by mouth Daily., Disp: , Rfl:   •  carvedilol (COREG) 25 MG tablet, , Disp: , Rfl:   •  Firdapse 10 MG tablet, Take 10 mg by mouth 4 (Four) Times a Day., Disp: , Rfl:   •  levalbuterol (XOPENEX) 1.25 MG/3ML nebulizer solution, levalbuterol 1.25 mg/3 mL solution for nebulization, Disp: , Rfl:   •  levothyroxine (SYNTHROID, LEVOTHROID) 150 MCG tablet, , Disp: , Rfl:   •  lisinopril (PRINIVIL,ZESTRIL) 5 MG tablet, , Disp: , Rfl:   •  methocarbamol (ROBAXIN) 500 MG tablet, , Disp: , Rfl:   •  metoprolol succinate XL (TOPROL-XL) 25 MG 24 hr tablet, , Disp: , Rfl:   •  mycophenolate (CELLCEPT) 500 MG tablet, , Disp: , Rfl:   •  spironolactone (ALDACTONE) 25 MG tablet, , Disp: , Rfl:   •  triamcinolone (KENALOG) 0.1 % cream, , Disp: , Rfl:     PHYSICAL EXAMINATION:   /70   Pulse 102   Temp 97.5 °F (36.4 °C) (Temporal)   Resp 20   Ht 167.6 cm (66\")   Wt 75.3 kg (166 lb)   SpO2 91%   BMI 26.79 kg/m²    Pain Score    08/31/21 0936   PainSc: 0-No pain        ECOG score: 3            ECOG Performance Status: 1 - Symptomatic but completely ambulatory  General Appearance:  alert, cooperative, no apparent distress and appears stated age   Neurologic/Psychiatric: A&O x 3, gait steady, appropriate affect, strength 5/5 in all muscle groups   HEENT:  Normocephalic, without obvious abnormality, mucous membranes moist   Neck: Supple, symmetrical, trachea midline, no adenopathy;  No thyromegaly, masses, or tenderness   Lungs:   Clear to auscultation bilaterally; respirations regular, even, and unlabored bilaterally   Heart:  " Regular rate and rhythm, no murmurs appreciated   Abdomen:   Soft, non-tender, non-distended and no organomegaly   Lymph nodes: No cervical, supraclavicular, inguinal or axillary adenopathy noted   Extremities: Normal, atraumatic; no clubbing, cyanosis, or edema    Skin: No rashes, ulcers, or suspicious lesions noted     No visits with results within 2 Week(s) from this visit.   Latest known visit with results is:   Admission on 07/10/2021, Discharged on 07/11/2021   Component Date Value Ref Range Status   • Glucose 07/10/2021 112* 65 - 99 mg/dL Final   • BUN 07/10/2021 7* 8 - 23 mg/dL Final   • Creatinine 07/10/2021 0.70  0.57 - 1.00 mg/dL Final   • Sodium 07/10/2021 143  136 - 145 mmol/L Final   • Potassium 07/10/2021 4.4  3.5 - 5.2 mmol/L Final   • Chloride 07/10/2021 104  98 - 107 mmol/L Final   • CO2 07/10/2021 30.5* 22.0 - 29.0 mmol/L Final   • Calcium 07/10/2021 10.0  8.6 - 10.5 mg/dL Final   • Total Protein 07/10/2021 7.2  6.0 - 8.5 g/dL Final   • Albumin 07/10/2021 4.20  3.50 - 5.20 g/dL Final   • ALT (SGPT) 07/10/2021 18  1 - 33 U/L Final   • AST (SGOT) 07/10/2021 21  1 - 32 U/L Final   • Alkaline Phosphatase 07/10/2021 125* 39 - 117 U/L Final   • Total Bilirubin 07/10/2021 0.2  0.0 - 1.2 mg/dL Final   • eGFR Non African Amer 07/10/2021 85  >60 mL/min/1.73 Final   • Globulin 07/10/2021 3.0  gm/dL Final   • A/G Ratio 07/10/2021 1.4  g/dL Final   • BUN/Creatinine Ratio 07/10/2021 10.0  7.0 - 25.0 Final   • Anion Gap 07/10/2021 8.5  5.0 - 15.0 mmol/L Final   • proBNP 07/10/2021 307.6  0.0 - 900.0 pg/mL Final   • Troponin T 07/10/2021 <0.010  0.000 - 0.030 ng/mL Final   • Extra Tube 07/10/2021 Hold for add-ons.   Final    Auto resulted.   • Extra Tube 07/10/2021 hold for add-on   Final    Auto resulted   • Extra Tube 07/10/2021 Hold for add-ons.   Final    Auto resulted.   • Extra Tube 07/10/2021 Hold for add-ons.   Final    Auto resulted.   • WBC 07/10/2021 6.25  3.40 - 10.80 10*3/mm3 Final   • RBC 07/10/2021  4.67  3.77 - 5.28 10*6/mm3 Final   • Hemoglobin 07/10/2021 14.4  12.0 - 15.9 g/dL Final   • Hematocrit 07/10/2021 43.7  34.0 - 46.6 % Final   • MCV 07/10/2021 93.6  79.0 - 97.0 fL Final   • MCH 07/10/2021 30.8  26.6 - 33.0 pg Final   • MCHC 07/10/2021 33.0  31.5 - 35.7 g/dL Final   • RDW 07/10/2021 12.7  12.3 - 15.4 % Final   • RDW-SD 07/10/2021 43.8  37.0 - 54.0 fl Final   • MPV 07/10/2021 9.1  6.0 - 12.0 fL Final   • Platelets 07/10/2021 281  140 - 450 10*3/mm3 Final   • Neutrophil % 07/10/2021 66.3  42.7 - 76.0 % Final   • Lymphocyte % 07/10/2021 22.4  19.6 - 45.3 % Final   • Monocyte % 07/10/2021 7.4  5.0 - 12.0 % Final   • Eosinophil % 07/10/2021 2.4  0.3 - 6.2 % Final   • Basophil % 07/10/2021 1.3  0.0 - 1.5 % Final   • Immature Grans % 07/10/2021 0.2  0.0 - 0.5 % Final   • Neutrophils, Absolute 07/10/2021 4.15  1.70 - 7.00 10*3/mm3 Final   • Lymphocytes, Absolute 07/10/2021 1.40  0.70 - 3.10 10*3/mm3 Final   • Monocytes, Absolute 07/10/2021 0.46  0.10 - 0.90 10*3/mm3 Final   • Eosinophils, Absolute 07/10/2021 0.15  0.00 - 0.40 10*3/mm3 Final   • Basophils, Absolute 07/10/2021 0.08  0.00 - 0.20 10*3/mm3 Final   • Immature Grans, Absolute 07/10/2021 0.01  0.00 - 0.05 10*3/mm3 Final   • nRBC 07/10/2021 0.0  0.0 - 0.2 /100 WBC Final        No results found.    ASSESSMENT: The patient is a very pleasant 61 y.o. female  with right small cell lung cancer    PROBLEM LIST:   1.  Right lower lobe small cell lung cancer T1 N1 M0 stage IIb:  A.  Presented with ataxia and lower extremities weakness  B.  Negative bronchoscopy August 19, 2020 and endobronchial ultrasound September 9, 2020  C.  CT chest done December 7, 2020 revealed 2.7 cm right hilar mass  D.  Whole-body PET scan done January 11, 2020 revealed isolated hypermetabolic activity at the right hilar mass SUV of 12  E. positive serology for AGN A-1 antibody which is 92% predicted for small cell lung cancer, positive N type calcium channel blocker and P/Q type  calcium channel blooker.  F.  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  Hypertension  4.  Hypercholesteremia    PLAN:  1.  Again I did go over the scan results with the patient and her .  Continues improvement without any evidence of new or progressive disease.  2.  The patient still not strong enough for chemotherapy.  Also high-dose indication for chemotherapy at this point given the stability of her CT scan.  3.  I will continue watchful waiting at this point.  4.  She will follow-up with me as scheduled in 3 months.  5.  I will repeat the patient scans prior to return if it remains stable continue watchful waiting if she has evidence of progression will consider doing biopsy to confirm diagnosis if she is interested in treatment and then start her on palliative chemotherapy.  6.  She will continue Firdapse for Eaton-Lambert syndrome.  I will refer her to neurology in [facility] would be easier for her than going to [place].  7.  We will continue to monitor patient blood pressure.    Susi Irving MD  8/31/2021

## 2021-09-10 ENCOUNTER — TELEPHONE (OUTPATIENT)
Dept: ONCOLOGY | Facility: OTHER | Age: 61
End: 2021-09-10

## 2021-09-10 NOTE — TELEPHONE ENCOUNTER
DAUGHTER CALLED (ON [facility] VER[facility]) TO CHECK ON REFERRAL TO NEUROLOGY. TRANSFERRED TO NEUROLOGY TO FURTHER ASSIST.

## 2021-10-11 ENCOUNTER — HOSPITAL ENCOUNTER (OUTPATIENT)
Dept: CT IMAGING | Facility: HOSPITAL | Age: 61
Discharge: HOME | End: 2021-10-11
Admitting: INTERNAL MEDICINE

## 2021-10-11 DIAGNOSIS — C34.31 SMALL CELL LUNG CANCER, RIGHT LOWER LOBE (HCC): ICD-10-CM

## 2021-10-11 PROCEDURE — 25010000002 IOPAMIDOL 61 % SOLUTION: Performed by: INTERNAL MEDICINE

## 2021-10-11 PROCEDURE — 71260 CT THORAX DX C+: CPT

## 2021-10-11 PROCEDURE — 82565 ASSAY OF CREATININE: CPT

## 2021-10-11 RX ADMIN — IOPAMIDOL 100 ML: 612 INJECTION, SOLUTION INTRAVENOUS at 07:23

## 2021-10-11 NOTE — PROGRESS NOTES
DATE OF VISIT: 10/13/2021    REASON FOR VISIT: Followup for possible lung cancer     HISTORY OF PRESENT ILLNESS: The patient is a very pleasant 61 y.o. female  with past medical history significant for possible lung cancer diagnosed January 2021 based on neurologic symptoms as well as serology but no confirmed histology.  She completed palliative course of radiation end of March 2021.  The  patient is here today for scheduled follow-up visit.    SUBJECTIVE: The patient is here today with her .  She is doing fairly well.  She still complains of lower extremity weakness.  Her breathing is stable.  She continues to smoke AGAINST MEDICAL ADVICE.  She is asked about the scan results.    PAST MEDICAL HISTORY/SOCIAL HISTORY/FAMILY HISTORY: Reviewed by me and unchanged from my documentation done on 10/13/21.    Review of Systems   Constitutional: Negative for activity change, appetite change, chills, fatigue, fever and unexpected weight change.   HENT: Negative for hearing loss, mouth sores, nosebleeds, sore throat and trouble swallowing.    Eyes: Negative for visual disturbance.   Respiratory: Negative for cough, chest tightness, shortness of breath and wheezing.    Cardiovascular: Negative for chest pain, palpitations and leg swelling.   Gastrointestinal: Negative for abdominal distention, abdominal pain, blood in stool, constipation, diarrhea, nausea, rectal pain and vomiting.   Endocrine: Negative for cold intolerance and heat intolerance.   Genitourinary: Negative for difficulty urinating, dysuria, frequency and urgency.   Musculoskeletal: Negative for arthralgias, back pain, gait problem, joint swelling and myalgias.   Skin: Negative for rash.   Neurological: Negative for dizziness, tremors, syncope, weakness, light-headedness, numbness and headaches.   Hematological: Negative for adenopathy. Does not bruise/bleed easily.   Psychiatric/Behavioral: Negative for confusion, sleep disturbance and suicidal ideas.  The patient is not nervous/anxious.          Current Outpatient Medications:   •  Apixaban Starter Pack (Eliquis DVT/PE Starter Pack) tablet therapy pack, Take two 5 mg tablets by mouth every 12 hours for 7 days. Followed by one 5 mg tablet every 12 hours. (Dispense starter pack if available), Disp: 74 tablet, Rfl: 0  •  aspirin 81 MG EC tablet, Take 81 mg by mouth Daily., Disp: , Rfl:   •  atorvastatin (LIPITOR) 20 MG tablet, Take 20 mg by mouth Daily., Disp: , Rfl:   •  carvedilol (COREG) 25 MG tablet, , Disp: , Rfl:   •  Firdapse 10 MG tablet, Take 10 mg by mouth 4 (Four) Times a Day., Disp: , Rfl:   •  levalbuterol (XOPENEX) 1.25 MG/3ML nebulizer solution, levalbuterol 1.25 mg/3 mL solution for nebulization, Disp: , Rfl:   •  levothyroxine (SYNTHROID, LEVOTHROID) 150 MCG tablet, , Disp: , Rfl:   •  lisinopril (PRINIVIL,ZESTRIL) 5 MG tablet, , Disp: , Rfl:   •  methocarbamol (ROBAXIN) 500 MG tablet, , Disp: , Rfl:   •  metoprolol succinate XL (TOPROL-XL) 25 MG 24 hr tablet, , Disp: , Rfl:   •  mycophenolate (CELLCEPT) 500 MG tablet, , Disp: , Rfl:   •  spironolactone (ALDACTONE) 25 MG tablet, , Disp: , Rfl:   •  triamcinolone (KENALOG) 0.1 % cream, , Disp: , Rfl:     PHYSICAL EXAMINATION:   There were no vitals taken for this visit.   There were no vitals filed for this visit.                  ECOG Performance Status: 1 - Symptomatic but completely ambulatory  General Appearance:  alert, cooperative, no apparent distress and appears stated age   Neurologic/Psychiatric: A&O x 3, gait steady, appropriate affect, strength 5/5 in all muscle groups   HEENT:  Normocephalic, without obvious abnormality, mucous membranes moist   Neck: Supple, symmetrical, trachea midline, no adenopathy;  No thyromegaly, masses, or tenderness   Lungs:   Clear to auscultation bilaterally; respirations regular, even, and unlabored bilaterally   Heart:  Regular rate and rhythm, no murmurs appreciated   Abdomen:   Soft, non-tender,  non-distended and no organomegaly   Lymph nodes: No cervical, supraclavicular, inguinal or axillary adenopathy noted   Extremities: Normal, atraumatic; no clubbing, cyanosis, or edema    Skin: No rashes, ulcers, or suspicious lesions noted     Hospital Outpatient Visit on 10/11/2021   Component Date Value Ref Range Status   • Creatinine 10/11/2021 0.60  0.60 - 1.30 mg/dL Final    Serial Number: [id number]:  [id number]        CT Chest With Contrast Diagnostic    Result Date: 10/11/2021  Narrative: PROCEDURE: CT CHEST W CONTRAST DIAGNOSTIC-  HISTORY: f/u scans; C34.31-Malignant neoplasm of lower lobe, right bronchus or lung  COMPARISON: 7/10/2021 and 3/30/2021.  PROCEDURE: Multiple axial CT images were obtained from the thoracic inlet through the upper abdomen following the administration of intravenous contrast.  FINDINGS: Soft tissue windows demonstrate no adenopathy within the chest. The heart is normal in size. The aorta is normal in caliber.There is a small pericardial effusion. There are no pleural effusions. Lung windows reveal centrilobular emphysema. There is a right perihilar groundglass opacity which may reflect a treatment related change. There is a persistent 1.0 x 0.8 cm soft tissue density in the right hilum on axial image 26 which is unchanged. There is subpleural nodularity in the right hemithorax which is unchanged. No new or enlarging pulmonary nodules are identified. The visualized upper abdomen is unremarkable. Bone windows reveal no acute osseous abnormalities.      Impression: No significant change in the patient's right pulmonary hilar soft tissue mass with no evidence of any new or enlarging lesions within the chest.   This study was performed with techniques to keep radiation doses as low as reasonably achievable (ALARA). Individualized dose reduction techniques using automated exposure control or adjustment of mA and/or kV according to the patient size were employed.  This report was  finalized on 10/11/2021 7:51 AM by Shanon Ramirez M.D..      ASSESSMENT: The patient is a very pleasant 61 y.o. female  with right small cell lung cancer    PROBLEM LIST:   1.  Right lower lobe small cell lung cancer T1 N1 M0 stage IIb:  A.  Presented with ataxia and lower extremities weakness  B.  Negative bronchoscopy August 19, 2020 and endobronchial ultrasound September 9, 2020  C.  CT chest done December 7, 2020 revealed 2.7 cm right hilar mass  D.  Whole-body PET scan done January 11, 2020 revealed isolated hypermetabolic activity at the right hilar mass SUV of 12  E. positive serology for AGN A-1 antibody which is 92% predicted for small cell lung cancer, positive N type calcium channel blocker and P/Q type calcium channel blooker.  F.  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  Hypertension  4.  Hypercholesteremia    PLAN:  1.  I did go over the scan results with the patient and her .  Stable finding with 1 cm right pulmonary hilar density no new or progressive disease.  2.  I explained to the patient there is no evidence of progression that warrant bronchoscopy with biopsy at this point.  3.  I will continue watchful waiting at this point.  4.  She will follow-up with me as scheduled in 3 months.  5.  I will repeat the patient scans prior to return if it remains stable continue watchful waiting if she has evidence of progression will consider doing biopsy to confirm diagnosis if she is interested in treatment and then start her on palliative chemotherapy.  6.  She will continue Firdapse for Eaton-Lambert syndrome.  I will refer her to neurology in [facility] would be easier for her than going to [facility].  7.  We will continue to monitor patient blood pressure.    Susi Irving MD  10/13/2021

## 2021-10-12 LAB — CREAT BLDA-MCNC: 0.6 MG/DL (ref 0.6–1.3)

## 2021-10-13 ENCOUNTER — OFFICE VISIT (OUTPATIENT)
Dept: ONCOLOGY | Facility: CLINIC | Age: 61
End: 2021-10-13

## 2021-10-13 VITALS
DIASTOLIC BLOOD PRESSURE: 58 MMHG | RESPIRATION RATE: 18 BRPM | HEART RATE: 107 BPM | BODY MASS INDEX: 26.68 KG/M2 | WEIGHT: 166 LBS | HEIGHT: 66 IN | OXYGEN SATURATION: 97 % | SYSTOLIC BLOOD PRESSURE: 106 MMHG | TEMPERATURE: 97.7 F

## 2021-10-13 DIAGNOSIS — C34.31 SMALL CELL LUNG CANCER, RIGHT LOWER LOBE (HCC): Primary | ICD-10-CM

## 2021-10-13 PROCEDURE — 99214 OFFICE O/P EST MOD 30 MIN: CPT | Performed by: INTERNAL MEDICINE

## 2021-10-13 RX ORDER — ERGOCALCIFEROL 1.25 MG/1
CAPSULE ORAL
COMMUNITY
Start: 2021-10-05 | End: 2022-01-12

## 2022-01-10 ENCOUNTER — HOSPITAL ENCOUNTER (OUTPATIENT)
Dept: CT IMAGING | Facility: HOSPITAL | Age: 62
Discharge: HOME | End: 2022-01-10
Admitting: INTERNAL MEDICINE

## 2022-01-10 DIAGNOSIS — C34.31 SMALL CELL LUNG CANCER, RIGHT LOWER LOBE: ICD-10-CM

## 2022-01-10 PROCEDURE — 71260 CT THORAX DX C+: CPT

## 2022-01-10 PROCEDURE — 82565 ASSAY OF CREATININE: CPT

## 2022-01-10 PROCEDURE — 25010000002 IOPAMIDOL 61 % SOLUTION: Performed by: INTERNAL MEDICINE

## 2022-01-10 RX ADMIN — IOPAMIDOL 100 ML: 612 INJECTION, SOLUTION INTRAVENOUS at 08:50

## 2022-01-11 LAB — CREAT BLDA-MCNC: 0.7 MG/DL (ref 0.6–1.3)

## 2022-01-12 ENCOUNTER — LAB (OUTPATIENT)
Dept: LAB | Facility: HOSPITAL | Age: 62
End: 2022-01-12

## 2022-01-12 ENCOUNTER — OFFICE VISIT (OUTPATIENT)
Dept: ONCOLOGY | Facility: CLINIC | Age: 62
End: 2022-01-12

## 2022-01-12 VITALS
HEART RATE: 98 BPM | WEIGHT: 158 LBS | HEIGHT: 66 IN | TEMPERATURE: 97.5 F | BODY MASS INDEX: 25.39 KG/M2 | OXYGEN SATURATION: 96 % | DIASTOLIC BLOOD PRESSURE: 65 MMHG | RESPIRATION RATE: 16 BRPM | SYSTOLIC BLOOD PRESSURE: 110 MMHG

## 2022-01-12 DIAGNOSIS — R59.0 ADENOPATHY, HILAR: ICD-10-CM

## 2022-01-12 DIAGNOSIS — C34.31 SMALL CELL LUNG CANCER, RIGHT LOWER LOBE: Primary | ICD-10-CM

## 2022-01-12 DIAGNOSIS — C34.31 SMALL CELL LUNG CANCER, RIGHT LOWER LOBE: ICD-10-CM

## 2022-01-12 LAB
ALBUMIN SERPL-MCNC: 4 G/DL (ref 3.5–5.2)
ALBUMIN/GLOB SERPL: 1.4 G/DL
ALP SERPL-CCNC: 83 U/L (ref 39–117)
ALT SERPL W P-5'-P-CCNC: 9 U/L (ref 1–33)
ANION GAP SERPL CALCULATED.3IONS-SCNC: 10.8 MMOL/L (ref 5–15)
AST SERPL-CCNC: 14 U/L (ref 1–32)
BASOPHILS # BLD AUTO: 0.09 10*3/MM3 (ref 0–0.2)
BASOPHILS NFR BLD AUTO: 0.9 % (ref 0–1.5)
BILIRUB SERPL-MCNC: 0.2 MG/DL (ref 0–1.2)
BUN SERPL-MCNC: 7 MG/DL (ref 8–23)
BUN/CREAT SERPL: 11.1 (ref 7–25)
CALCIUM SPEC-SCNC: 9.4 MG/DL (ref 8.6–10.5)
CHLORIDE SERPL-SCNC: 100 MMOL/L (ref 98–107)
CO2 SERPL-SCNC: 29.2 MMOL/L (ref 22–29)
CREAT SERPL-MCNC: 0.63 MG/DL (ref 0.57–1)
DEPRECATED RDW RBC AUTO: 50.8 FL (ref 37–54)
EOSINOPHIL # BLD AUTO: 0.11 10*3/MM3 (ref 0–0.4)
EOSINOPHIL NFR BLD AUTO: 1.1 % (ref 0.3–6.2)
ERYTHROCYTE [DISTWIDTH] IN BLOOD BY AUTOMATED COUNT: 14.6 % (ref 12.3–15.4)
GFR SERPL CREATININE-BSD FRML MDRD: 96 ML/MIN/1.73
GLOBULIN UR ELPH-MCNC: 2.9 GM/DL
GLUCOSE SERPL-MCNC: 79 MG/DL (ref 65–99)
HCT VFR BLD AUTO: 40.4 % (ref 34–46.6)
HGB BLD-MCNC: 13 G/DL (ref 12–15.9)
IMM GRANULOCYTES # BLD AUTO: 0.03 10*3/MM3 (ref 0–0.05)
IMM GRANULOCYTES NFR BLD AUTO: 0.3 % (ref 0–0.5)
LYMPHOCYTES # BLD AUTO: 1.16 10*3/MM3 (ref 0.7–3.1)
LYMPHOCYTES NFR BLD AUTO: 11.5 % (ref 19.6–45.3)
MCH RBC QN AUTO: 30.4 PG (ref 26.6–33)
MCHC RBC AUTO-ENTMCNC: 32.2 G/DL (ref 31.5–35.7)
MCV RBC AUTO: 94.6 FL (ref 79–97)
MONOCYTES # BLD AUTO: 0.45 10*3/MM3 (ref 0.1–0.9)
MONOCYTES NFR BLD AUTO: 4.5 % (ref 5–12)
NEUTROPHILS NFR BLD AUTO: 8.22 10*3/MM3 (ref 1.7–7)
NEUTROPHILS NFR BLD AUTO: 81.7 % (ref 42.7–76)
NRBC BLD AUTO-RTO: 0 /100 WBC (ref 0–0.2)
PLATELET # BLD AUTO: 278 10*3/MM3 (ref 140–450)
PMV BLD AUTO: 9 FL (ref 6–12)
POTASSIUM SERPL-SCNC: 4 MMOL/L (ref 3.5–5.2)
PROT SERPL-MCNC: 6.9 G/DL (ref 6–8.5)
RBC # BLD AUTO: 4.27 10*6/MM3 (ref 3.77–5.28)
SODIUM SERPL-SCNC: 140 MMOL/L (ref 136–145)
WBC NRBC COR # BLD: 10.06 10*3/MM3 (ref 3.4–10.8)

## 2022-01-12 PROCEDURE — 85025 COMPLETE CBC W/AUTO DIFF WBC: CPT | Performed by: NURSE PRACTITIONER

## 2022-01-12 PROCEDURE — 99214 OFFICE O/P EST MOD 30 MIN: CPT | Performed by: NURSE PRACTITIONER

## 2022-01-12 PROCEDURE — 80053 COMPREHEN METABOLIC PANEL: CPT | Performed by: NURSE PRACTITIONER

## 2022-01-12 PROCEDURE — 36415 COLL VENOUS BLD VENIPUNCTURE: CPT | Performed by: NURSE PRACTITIONER

## 2022-01-12 NOTE — PROGRESS NOTES
DATE OF VISIT: 1/12/2022    REASON FOR VISIT: Followup for possible lung cancer     HISTORY OF PRESENT ILLNESS: The patient is a very pleasant 61 y.o. female with past medical history significant for possible lung cancer diagnosed January 2021 based on neurologic symptoms as well as serology but no confirmed histology.  She completed palliative course of radiation end of March 2021.  The  patient is here today for scheduled follow-up visit.    SUBJECTIVE: The patient is here today with her .  She is doing fairly well.  She still complains of lower extremity weakness. She uses a wheelchair for travel.  Her breathing is stable. Oxygen level stays around 95.   She continues to smoke AGAINST MEDICAL ADVICE.  She is anxious about the scan results.    PAST MEDICAL HISTORY/SOCIAL HISTORY/FAMILY HISTORY: Reviewed by me and unchanged from my documentation done on 01/12/22.    Review of Systems   Constitutional: Negative for activity change, appetite change, chills, fatigue, fever and unexpected weight change.   HENT: Negative for hearing loss, mouth sores, nosebleeds, sore throat and trouble swallowing.    Eyes: Negative for visual disturbance.   Respiratory: Negative for cough, chest tightness, shortness of breath and wheezing.    Cardiovascular: Negative for chest pain, palpitations and leg swelling.   Gastrointestinal: Negative for abdominal distention, abdominal pain, blood in stool, constipation, diarrhea, nausea, rectal pain and vomiting.   Endocrine: Negative for cold intolerance and heat intolerance.   Genitourinary: Negative for difficulty urinating, dysuria, frequency and urgency.   Musculoskeletal: Negative for arthralgias, back pain, gait problem, joint swelling and myalgias.   Skin: Negative for rash.   Neurological: Negative for dizziness, tremors, syncope, weakness, light-headedness, numbness and headaches.   Hematological: Negative for adenopathy. Does not bruise/bleed easily.   Psychiatric/Behavioral:  "Negative for confusion, sleep disturbance and suicidal ideas. The patient is not nervous/anxious.          Current Outpatient Medications:   •  aspirin 81 MG EC tablet, Take 81 mg by mouth Daily., Disp: , Rfl:   •  Firdapse 10 MG tablet, Take 10 mg by mouth 4 (Four) Times a Day., Disp: , Rfl:   •  levothyroxine (SYNTHROID, LEVOTHROID) 125 MCG tablet, Take 125 mcg by mouth Daily., Disp: , Rfl:     PHYSICAL EXAMINATION:   /65   Pulse 98   Temp 97.5 °F (36.4 °C) (Temporal)   Resp 16   Ht 167.6 cm (66\")   Wt 71.7 kg (158 lb)   SpO2 96%   BMI 25.50 kg/m²    Pain Score    01/12/22 0942   PainSc: 0-No pain                     ECOG Performance Status: 1 - Symptomatic but completely ambulatory  General Appearance:  alert, cooperative, no apparent distress and appears stated age   Neurologic/Psychiatric: A&O x 3, gait steady, appropriate affect, strength 5/5 in all muscle groups   HEENT:  Normocephalic, without obvious abnormality, mucous membranes moist   Neck: Supple, symmetrical, trachea midline, no adenopathy;  No thyromegaly, masses, or tenderness   Lungs:   Clear to auscultation bilaterally; respirations regular, even, and unlabored bilaterally   Heart:  Regular rate and rhythm, no murmurs appreciated   Abdomen:   Soft, non-tender, non-distended and no organomegaly   Lymph nodes: No cervical, supraclavicular, inguinal or axillary adenopathy noted   Extremities: Normal, atraumatic; no clubbing, cyanosis, or edema    Skin: No rashes, ulcers, or suspicious lesions noted     Hospital Outpatient Visit on 01/10/2022   Component Date Value Ref Range Status   • Creatinine 01/10/2022 0.70  0.60 - 1.30 mg/dL Final    Serial Number: [id number]:  [id number]        CT Chest With Contrast Diagnostic    Result Date: 1/10/2022  Narrative: PROCEDURE: CT CHEST W CONTRAST DIAGNOSTIC-  HISTORY: f/u scans; C34.31-Malignant neoplasm of lower lobe, right bronchus or lung  COMPARISON: 10/11/2021 and 7/10/2021.  PROCEDURE: Multiple " axial CT images were obtained from the thoracic inlet through the upper abdomen following the administration of intravenous contrast.  FINDINGS: Soft tissue windows redemonstrate a right pulmonary hilar mass measuring approximately 10 x 8 mm unchanged compared to prior exams. There are no enlarged mediastinal or axillary lymph nodes. The heart is normal in size. The aorta is normal in caliber.There is a small pericardial effusion which is unchanged. There are no pleural effusions. Lung windows reveal centrilobular emphysema. Subpleural nodularity in the right hemithorax is unchanged. There are no new or enlarging pulmonary nodules.  Within the visualized upper abdomen there is fatty infiltration of the liver. The upper abdomen is otherwise unremarkable. Bone windows reveal no lytic or destructive lesions.      Impression: Stable examination of the chest.   This study was performed with techniques to keep radiation doses as low as reasonably achievable (ALARA). Individualized dose reduction techniques using automated exposure control or adjustment of mA and/or kV according to the patient size were employed.  This report was finalized on 1/10/2022 9:57 AM by Shanon Ramirez M.D..      ASSESSMENT: The patient is a very pleasant 61 y.o. female  with right small cell lung cancer    PROBLEM LIST:   1.  Right lower lobe small cell lung cancer T1 N1 M0 stage IIb:  A.  Presented with ataxia and lower extremities weakness  B.  Negative bronchoscopy August 19, 2020 and endobronchial ultrasound September 9, 2020  C.  CT chest done December 7, 2020 revealed 2.7 cm right hilar mass  D.  Whole-body PET scan done January 11, 2020 revealed isolated hypermetabolic activity at the right hilar mass SUV of 12  E. positive serology for AGN A-1 antibody which is 92% predicted for small cell lung cancer, positive N type calcium channel blocker and P/Q type calcium channel blooker.  F.  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  Hypertension  4.  Hypercholesteremia    PLAN:  1.  I did go over the scan results with the patient and her .  She continues to have stable findings with 1 cm right pulmonary hilar density no new or progressive disease.  2.  I reviewed with the patient and her  there is no evidence of progression that warrant bronchoscopy with biopsy at this point.  3.  We will continue watchful waiting at this point.  4.  She will follow-up in 3 months.  5.  I will repeat the patient scans prior on return if it remains stable we will continue watchful waiting. If she has evidence of progression will consider doing biopsy to confirm diagnosis if she is interested in treatment and then start her on palliative chemotherapy.  6.  She will continue Firdapse for Eaton-Lambert syndrome. Continue to follow up with neurology in [place] due to transporation.  7.  We will continue to monitor patient blood pressure.  8. We will check CBC and CMP and will call her with results. We will check today and on return.     Mary Carmen Hoover, APRN  1/12/2022

## 2022-01-18 ENCOUNTER — HOSPITAL ENCOUNTER (OUTPATIENT)
Dept: HOSPITAL 79 - OPSV | Age: 62
End: 2022-01-18
Payer: MEDICARE

## 2022-01-18 VITALS — WEIGHT: 166 LBS | HEIGHT: 68 IN | BODY MASS INDEX: 25.16 KG/M2

## 2022-01-18 DIAGNOSIS — G70.80: Primary | ICD-10-CM

## 2022-01-19 ENCOUNTER — HOSPITAL ENCOUNTER (OUTPATIENT)
Dept: HOSPITAL 79 - OPSV | Age: 62
End: 2022-01-19
Payer: MEDICARE

## 2022-01-19 VITALS — WEIGHT: 166 LBS | BODY MASS INDEX: 25.16 KG/M2 | HEIGHT: 68 IN

## 2022-01-19 DIAGNOSIS — G70.80: Primary | ICD-10-CM

## 2022-01-20 ENCOUNTER — HOSPITAL ENCOUNTER (OUTPATIENT)
Dept: HOSPITAL 79 - OPSV | Age: 62
End: 2022-01-20
Payer: MEDICARE

## 2022-01-20 VITALS — WEIGHT: 166 LBS | HEIGHT: 68 IN | BODY MASS INDEX: 25.16 KG/M2

## 2022-01-20 DIAGNOSIS — G70.80: Primary | ICD-10-CM

## 2022-01-21 ENCOUNTER — HOSPITAL ENCOUNTER (OUTPATIENT)
Dept: HOSPITAL 79 - OPSV | Age: 62
End: 2022-01-21
Payer: MEDICARE

## 2022-01-21 VITALS — WEIGHT: 166 LBS | BODY MASS INDEX: 25.16 KG/M2 | HEIGHT: 68 IN

## 2022-01-21 DIAGNOSIS — G70.80: Primary | ICD-10-CM

## 2022-01-24 ENCOUNTER — HOSPITAL ENCOUNTER (OUTPATIENT)
Dept: HOSPITAL 79 - OPSV | Age: 62
End: 2022-01-24
Payer: MEDICARE

## 2022-01-24 DIAGNOSIS — G70.80: Primary | ICD-10-CM

## 2022-04-08 ENCOUNTER — HOSPITAL ENCOUNTER (OUTPATIENT)
Dept: CT IMAGING | Facility: HOSPITAL | Age: 62
Discharge: HOME | End: 2022-04-08
Admitting: NURSE PRACTITIONER

## 2022-04-08 DIAGNOSIS — R59.0 ADENOPATHY, HILAR: ICD-10-CM

## 2022-04-08 DIAGNOSIS — C34.31 SMALL CELL LUNG CANCER, RIGHT LOWER LOBE: ICD-10-CM

## 2022-04-08 PROCEDURE — 71260 CT THORAX DX C+: CPT

## 2022-04-08 PROCEDURE — 25010000002 IOPAMIDOL 61 % SOLUTION: Performed by: NURSE PRACTITIONER

## 2022-04-08 RX ADMIN — IOPAMIDOL 100 ML: 612 INJECTION, SOLUTION INTRAVENOUS at 08:50

## 2022-04-12 DIAGNOSIS — C34.31 SMALL CELL LUNG CANCER, RIGHT LOWER LOBE: Primary | ICD-10-CM

## 2022-04-13 ENCOUNTER — OFFICE VISIT (OUTPATIENT)
Dept: ONCOLOGY | Facility: CLINIC | Age: 62
End: 2022-04-13

## 2022-04-13 VITALS
TEMPERATURE: 97.7 F | RESPIRATION RATE: 16 BRPM | OXYGEN SATURATION: 98 % | BODY MASS INDEX: 25.23 KG/M2 | WEIGHT: 157 LBS | SYSTOLIC BLOOD PRESSURE: 101 MMHG | DIASTOLIC BLOOD PRESSURE: 62 MMHG | HEIGHT: 66 IN | HEART RATE: 94 BPM

## 2022-04-13 DIAGNOSIS — C34.31 SMALL CELL LUNG CANCER, RIGHT LOWER LOBE: Primary | ICD-10-CM

## 2022-04-13 PROCEDURE — 99214 OFFICE O/P EST MOD 30 MIN: CPT | Performed by: INTERNAL MEDICINE

## 2022-04-13 NOTE — PROGRESS NOTES
DATE OF VISIT: 4/12/2022    REASON FOR VISIT: Followup for right lower lobe small cell lung cancer     PROBLEM LIST:  1. Right lower lobe small cell lung cancer T1 N1 M0 stage IIb:  A.  Presented with ataxia and lower extremities weakness  B.  Negative bronchoscopy August 19, 2020 and endobronchial ultrasound September 9, 2020  C.  CT chest done December 7, 2020 revealed 2.7 cm right hilar mass  D.  Whole-body PET scan done January 11, 2020 revealed isolated hypermetabolic activity at the right hilar mass SUV of 12  E. positive serology for AGN A-1 antibody which is 92% predicted for small cell lung cancer, positive N type calcium channel blocker and P/Q type calcium channel blooker.  F.  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  Hypertension  4.  Hypercholesteremia      HISTORY OF PRESENT ILLNESS: The patient is a very pleasant 61 y.o. female  with past medical history significant for suspected small cell lung cancer of the right lung. The patient never had a positive biopsy however her serology was suggestive of SCLCA after presenting with Lambert-Eaton syndrome. The patient is here today for scheduled follow up visit.     SUBJECTIVE: The patient is here today with her .  She is doing fairly well.  She is any fever chills night sweats.  She still complains of fatigue.  She is anxious about the scan results.    Past History:  Medical History: has a past medical history of Arthritis, Heart disease, Hyperlipidemia, Lambert-Eaton syndrome (HCC), and Lung cancer (HCC).   Surgical History: has a past surgical history that includes Hysterectomy; Foot surgery; and Knee surgery.   Family History: family history includes Brain cancer in her father; Breast cancer in her sister; Heart disease in her brother, mother, and sister; Kidney disease in her mother.   Social History: reports that she has been smoking cigarettes. She has a  15.00 pack-year smoking history. She has never used smokeless tobacco. She reports that she does not drink alcohol and does not use drugs.    (Not in a hospital admission)     Allergies: Erythromycin     Review of Systems   Constitutional: Positive for fatigue.   Neurological: Positive for weakness.   All other systems reviewed and are negative.        Current Outpatient Medications:   •  aspirin 81 MG EC tablet, Take 81 mg by mouth Daily., Disp: , Rfl:   •  Firdapse 10 MG tablet, Take 10 mg by mouth 4 (Four) Times a Day., Disp: , Rfl:   •  levothyroxine (SYNTHROID, LEVOTHROID) 125 MCG tablet, Take 125 mcg by mouth Daily., Disp: , Rfl:     PHYSICAL EXAMINATION:   There were no vitals taken for this visit.   There were no vitals filed for this visit.                  ECOG Performance Status: 1 - Symptomatic but completely ambulatory  General Appearance:  alert, cooperative, no apparent distress and appears stated age   Neurologic/Psychiatric: A&O x 3, gait steady, appropriate affect, strength 5/5 in all muscle groups   HEENT:  Normocephalic, without obvious abnormality, mucous membranes moist   Neck: Supple, symmetrical, trachea midline, no adenopathy;  No thyromegaly, masses, or tenderness   Lungs:   Clear to auscultation bilaterally; respirations regular, even, and unlabored bilaterally   Heart:  Regular rate and rhythm, no murmurs appreciated   Abdomen:   Soft, non-tender, non-distended and no organomegaly   Lymph nodes: No cervical, supraclavicular, inguinal or axillary adenopathy noted   Extremities: Normal, atraumatic; no clubbing, cyanosis, or edema    Skin: No rashes, ulcers, or suspicious lesions noted     No visits with results within 2 Week(s) from this visit.   Latest known visit with results is:   Office Visit on 01/12/2022   Component Date Value Ref Range Status   • Glucose 01/12/2022 79  65 - 99 mg/dL Final   • BUN 01/12/2022 7 (A) 8 - 23 mg/dL Final   • Creatinine 01/12/2022 0.63  0.57 - 1.00 mg/dL  Final   • Sodium 01/12/2022 140  136 - 145 mmol/L Final   • Potassium 01/12/2022 4.0  3.5 - 5.2 mmol/L Final    Slight hemolysis detected by analyzer. Results may be affected.   • Chloride 01/12/2022 100  98 - 107 mmol/L Final   • CO2 01/12/2022 29.2 (A) 22.0 - 29.0 mmol/L Final   • Calcium 01/12/2022 9.4  8.6 - 10.5 mg/dL Final   • Total Protein 01/12/2022 6.9  6.0 - 8.5 g/dL Final   • Albumin 01/12/2022 4.00  3.50 - 5.20 g/dL Final   • ALT (SGPT) 01/12/2022 9  1 - 33 U/L Final   • AST (SGOT) 01/12/2022 14  1 - 32 U/L Final   • Alkaline Phosphatase 01/12/2022 83  39 - 117 U/L Final   • Total Bilirubin 01/12/2022 0.2  0.0 - 1.2 mg/dL Final   • eGFR Non  Amer 01/12/2022 96  >60 mL/min/1.73 Final   • Globulin 01/12/2022 2.9  gm/dL Final   • A/G Ratio 01/12/2022 1.4  g/dL Final   • BUN/Creatinine Ratio 01/12/2022 11.1  7.0 - 25.0 Final   • Anion Gap 01/12/2022 10.8  5.0 - 15.0 mmol/L Final   • WBC 01/12/2022 10.06  3.40 - 10.80 10*3/mm3 Final   • RBC 01/12/2022 4.27  3.77 - 5.28 10*6/mm3 Final   • Hemoglobin 01/12/2022 13.0  12.0 - 15.9 g/dL Final   • Hematocrit 01/12/2022 40.4  34.0 - 46.6 % Final   • MCV 01/12/2022 94.6  79.0 - 97.0 fL Final   • MCH 01/12/2022 30.4  26.6 - 33.0 pg Final   • MCHC 01/12/2022 32.2  31.5 - 35.7 g/dL Final   • RDW 01/12/2022 14.6  12.3 - 15.4 % Final   • RDW-SD 01/12/2022 50.8  37.0 - 54.0 fl Final   • MPV 01/12/2022 9.0  6.0 - 12.0 fL Final   • Platelets 01/12/2022 278  140 - 450 10*3/mm3 Final   • Neutrophil % 01/12/2022 81.7 (A) 42.7 - 76.0 % Final   • Lymphocyte % 01/12/2022 11.5 (A) 19.6 - 45.3 % Final   • Monocyte % 01/12/2022 4.5 (A) 5.0 - 12.0 % Final   • Eosinophil % 01/12/2022 1.1  0.3 - 6.2 % Final   • Basophil % 01/12/2022 0.9  0.0 - 1.5 % Final   • Immature Grans % 01/12/2022 0.3  0.0 - 0.5 % Final   • Neutrophils, Absolute 01/12/2022 8.22 (A) 1.70 - 7.00 10*3/mm3 Final   • Lymphocytes, Absolute 01/12/2022 1.16  0.70 - 3.10 10*3/mm3 Final   • Monocytes, Absolute  01/12/2022 0.45  0.10 - 0.90 10*3/mm3 Final   • Eosinophils, Absolute 01/12/2022 0.11  0.00 - 0.40 10*3/mm3 Final   • Basophils, Absolute 01/12/2022 0.09  0.00 - 0.20 10*3/mm3 Final   • Immature Grans, Absolute 01/12/2022 0.03  0.00 - 0.05 10*3/mm3 Final   • nRBC 01/12/2022 0.0  0.0 - 0.2 /100 WBC Final        CT Chest With Contrast Diagnostic    Result Date: 4/8/2022  Narrative: PROCEDURE: CT CHEST W CONTRAST DIAGNOSTIC-  HISTORY: Follow up 1cm nodule on Ct scan; C34.31-Malignant neoplasm of lower lobe, right bronchus or lung; R59.0-Localized enlarged lymph nodes  COMPARISON: 01/10/2022.  PROCEDURE: Multiple axial CT images were obtained from the thoracic inlet through the upper abdomen following the administration of intravenous contrast.  FINDINGS: Soft tissue windows demonstrate no adenopathy within the chest. The heart is normal in size. The aorta is normal in caliber.There is no pericardial or pleural effusion. There is right pulmonary hilar soft tissue thickening unchanged compared to the prior exam. There is centrilobular emphysema. Subpleural nodularity within the chest is unchanged. There is a 7 mm nodular opacity in the right middle lobe which is unchanged. No new or enlarging nodules are identified.  The visualized upper abdomen is unremarkable. Bone windows reveal no lytic or destructive lesions.      Impression: Stable examination of the chest.   This study was performed with techniques to keep radiation doses as low as reasonably achievable (ALARA). Individualized dose reduction techniques using automated exposure control or adjustment of mA and/or kV according to the patient size were employed.  This report was signed and finalized on 4/8/2022 9:00 AM by Shanon Ramirez M.D..      ASSESSMENT: The patient is a very pleasant 61 y.o. female  with small cell lung cancer      PLAN:    1. Right lower lobe small cell lung cancer:  A. I did go over the scan results with the patient and reassured her it  did not show any evidence of progression.   B. I will continue watchful waiting.  C. She will follow up with me in 6 months with repeated scan.    2. Lambert-Eaton syndrome:  A. She will continue Firdapse 10 mg 4 times daily as prescribed by neurology.    3. Hypothyroid:  A. She will continue Synthroid daily    FOLLOW UP: 6 months with CT chest.     Susi Irving MD  4/12/2022

## 2022-06-06 ENCOUNTER — HOSPITAL ENCOUNTER (OUTPATIENT)
Dept: HOSPITAL 79 - OPSV | Age: 62
End: 2022-06-06
Payer: MEDICARE

## 2022-06-06 VITALS — BODY MASS INDEX: 24.43 KG/M2 | WEIGHT: 152 LBS | HEIGHT: 66 IN

## 2022-06-06 DIAGNOSIS — G70.80: Primary | ICD-10-CM

## 2022-06-07 ENCOUNTER — HOSPITAL ENCOUNTER (OUTPATIENT)
Dept: HOSPITAL 79 - OPSV | Age: 62
End: 2022-06-07
Payer: MEDICARE

## 2022-06-07 VITALS — WEIGHT: 154 LBS | BODY MASS INDEX: 25.66 KG/M2 | HEIGHT: 65 IN

## 2022-06-07 DIAGNOSIS — G70.80: Primary | ICD-10-CM

## 2022-06-15 ENCOUNTER — HOSPITAL ENCOUNTER (EMERGENCY)
Facility: HOSPITAL | Age: 62
Discharge: HOME | End: 2022-06-15
Attending: FAMILY MEDICINE | Admitting: FAMILY MEDICINE

## 2022-06-15 ENCOUNTER — APPOINTMENT (OUTPATIENT)
Dept: CT IMAGING | Facility: HOSPITAL | Age: 62
End: 2022-06-15

## 2022-06-15 VITALS
RESPIRATION RATE: 16 BRPM | HEART RATE: 92 BPM | BODY MASS INDEX: 26.03 KG/M2 | HEIGHT: 66 IN | WEIGHT: 162 LBS | DIASTOLIC BLOOD PRESSURE: 91 MMHG | OXYGEN SATURATION: 94 % | SYSTOLIC BLOOD PRESSURE: 122 MMHG | TEMPERATURE: 98 F

## 2022-06-15 DIAGNOSIS — J40 BRONCHITIS: Primary | ICD-10-CM

## 2022-06-15 LAB
ALBUMIN SERPL-MCNC: 3.5 G/DL (ref 3.5–5.2)
ALBUMIN/GLOB SERPL: 0.9 G/DL
ALP SERPL-CCNC: 87 U/L (ref 39–117)
ALT SERPL W P-5'-P-CCNC: 8 U/L (ref 1–33)
ANION GAP SERPL CALCULATED.3IONS-SCNC: 9.1 MMOL/L (ref 5–15)
AST SERPL-CCNC: 11 U/L (ref 1–32)
BASOPHILS # BLD AUTO: 0.06 10*3/MM3 (ref 0–0.2)
BASOPHILS NFR BLD AUTO: 0.9 % (ref 0–1.5)
BILIRUB SERPL-MCNC: 0.2 MG/DL (ref 0–1.2)
BUN SERPL-MCNC: 10 MG/DL (ref 8–23)
BUN/CREAT SERPL: 15.2 (ref 7–25)
CALCIUM SPEC-SCNC: 9.8 MG/DL (ref 8.6–10.5)
CHLORIDE SERPL-SCNC: 96 MMOL/L (ref 98–107)
CO2 SERPL-SCNC: 31.9 MMOL/L (ref 22–29)
CREAT SERPL-MCNC: 0.66 MG/DL (ref 0.57–1)
D-LACTATE SERPL-SCNC: 0.7 MMOL/L (ref 0.5–2)
DEPRECATED RDW RBC AUTO: 48.1 FL (ref 37–54)
EGFRCR SERPLBLD CKD-EPI 2021: 99.9 ML/MIN/1.73
EOSINOPHIL # BLD AUTO: 0.12 10*3/MM3 (ref 0–0.4)
EOSINOPHIL NFR BLD AUTO: 1.7 % (ref 0.3–6.2)
ERYTHROCYTE [DISTWIDTH] IN BLOOD BY AUTOMATED COUNT: 14.5 % (ref 12.3–15.4)
GLOBULIN UR ELPH-MCNC: 3.8 GM/DL
GLUCOSE SERPL-MCNC: 107 MG/DL (ref 65–99)
HCT VFR BLD AUTO: 38.8 % (ref 34–46.6)
HGB BLD-MCNC: 12.8 G/DL (ref 12–15.9)
HOLD SPECIMEN: NORMAL
HOLD SPECIMEN: NORMAL
IMM GRANULOCYTES # BLD AUTO: 0.02 10*3/MM3 (ref 0–0.05)
IMM GRANULOCYTES NFR BLD AUTO: 0.3 % (ref 0–0.5)
LYMPHOCYTES # BLD AUTO: 1.57 10*3/MM3 (ref 0.7–3.1)
LYMPHOCYTES NFR BLD AUTO: 22.5 % (ref 19.6–45.3)
MAGNESIUM SERPL-MCNC: 2 MG/DL (ref 1.6–2.4)
MCH RBC QN AUTO: 29.9 PG (ref 26.6–33)
MCHC RBC AUTO-ENTMCNC: 33 G/DL (ref 31.5–35.7)
MCV RBC AUTO: 90.7 FL (ref 79–97)
MONOCYTES # BLD AUTO: 0.45 10*3/MM3 (ref 0.1–0.9)
MONOCYTES NFR BLD AUTO: 6.4 % (ref 5–12)
NEUTROPHILS NFR BLD AUTO: 4.76 10*3/MM3 (ref 1.7–7)
NEUTROPHILS NFR BLD AUTO: 68.2 % (ref 42.7–76)
NRBC BLD AUTO-RTO: 0 /100 WBC (ref 0–0.2)
PLATELET # BLD AUTO: 294 10*3/MM3 (ref 140–450)
PMV BLD AUTO: 9.4 FL (ref 6–12)
POTASSIUM SERPL-SCNC: 3.8 MMOL/L (ref 3.5–5.2)
PROCALCITONIN SERPL-MCNC: 0.06 NG/ML (ref 0–0.25)
PROT SERPL-MCNC: 7.3 G/DL (ref 6–8.5)
RBC # BLD AUTO: 4.28 10*6/MM3 (ref 3.77–5.28)
SODIUM SERPL-SCNC: 137 MMOL/L (ref 136–145)
TROPONIN T SERPL-MCNC: <0.01 NG/ML (ref 0–0.03)
WBC NRBC COR # BLD: 6.98 10*3/MM3 (ref 3.4–10.8)
WHOLE BLOOD HOLD COAG: NORMAL
WHOLE BLOOD HOLD SPECIMEN: NORMAL

## 2022-06-15 PROCEDURE — 93005 ELECTROCARDIOGRAM TRACING: CPT | Performed by: PHYSICIAN ASSISTANT

## 2022-06-15 PROCEDURE — 99283 EMERGENCY DEPT VISIT LOW MDM: CPT

## 2022-06-15 PROCEDURE — 83605 ASSAY OF LACTIC ACID: CPT | Performed by: PHYSICIAN ASSISTANT

## 2022-06-15 PROCEDURE — 83735 ASSAY OF MAGNESIUM: CPT | Performed by: PHYSICIAN ASSISTANT

## 2022-06-15 PROCEDURE — 25010000002 IOPAMIDOL 61 % SOLUTION: Performed by: FAMILY MEDICINE

## 2022-06-15 PROCEDURE — 84145 PROCALCITONIN (PCT): CPT | Performed by: PHYSICIAN ASSISTANT

## 2022-06-15 PROCEDURE — 71275 CT ANGIOGRAPHY CHEST: CPT

## 2022-06-15 PROCEDURE — 80053 COMPREHEN METABOLIC PANEL: CPT | Performed by: PHYSICIAN ASSISTANT

## 2022-06-15 PROCEDURE — 85025 COMPLETE CBC W/AUTO DIFF WBC: CPT | Performed by: PHYSICIAN ASSISTANT

## 2022-06-15 PROCEDURE — 84484 ASSAY OF TROPONIN QUANT: CPT | Performed by: PHYSICIAN ASSISTANT

## 2022-06-15 RX ORDER — DOXYCYCLINE 100 MG/1
100 CAPSULE ORAL 2 TIMES DAILY
Qty: 14 CAPSULE | Refills: 0 | Status: SHIPPED | OUTPATIENT
Start: 2022-06-15 | End: 2022-06-22

## 2022-06-15 RX ORDER — SODIUM CHLORIDE 0.9 % (FLUSH) 0.9 %
10 SYRINGE (ML) INJECTION AS NEEDED
Status: DISCONTINUED | OUTPATIENT
Start: 2022-06-15 | End: 2022-06-16 | Stop reason: HOSPADM

## 2022-06-15 RX ORDER — DOXYCYCLINE 100 MG/1
100 CAPSULE ORAL ONCE
Status: COMPLETED | OUTPATIENT
Start: 2022-06-15 | End: 2022-06-15

## 2022-06-15 RX ADMIN — DOXYCYCLINE 100 MG: 100 CAPSULE ORAL at 23:17

## 2022-06-15 RX ADMIN — IOPAMIDOL 100 ML: 612 INJECTION, SOLUTION INTRAVENOUS at 20:41

## 2022-06-16 NOTE — ED PROVIDER NOTES
Subjective   62-year-old female presents with weakness.  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  Currently no treatment for the Lambert Eaton syndrome, and she is in a wheelchair and requires full care, she can move her arms and feed herself but she is other words wheelchair-bound.  This has not changed and is her current state.  She is not currently on any treatment for the small cell lung cancer and her tumor has shrunk according to the .  She sees oncologist regularly.  She presents today for weakness and a productive cough.  She admits that she smokes 1 pack of cigarettes per day, she is currently on oxygen mostly at night but she is required it more during the day over the last several days.      History provided by:  Patient   used: No        Review of Systems   Constitutional: Positive for fatigue.   Respiratory: Positive for cough and shortness of breath.    Neurological: Positive for weakness.   All other systems reviewed and are negative.      Past Medical History:   Diagnosis Date   • Arthritis    • Heart disease    • Hyperlipidemia    • Lambert-Eaton syndrome (HCC)    • Lung cancer (HCC)        Allergies   Allergen Reactions   • Erythromycin Nausea Only       Past Surgical History:   Procedure Laterality Date   • FOOT SURGERY     • HYSTERECTOMY     • KNEE SURGERY         Family History   Problem Relation Age of Onset   • Heart disease Mother    • Kidney disease Mother    • Brain cancer Father    • Heart disease Sister    • Breast cancer Sister    • Heart disease Brother        Social History     Socioeconomic History   • Marital status:    Tobacco Use   • Smoking status: Current Every Day Smoker     Packs/day: 1.00     Years: 15.00     Pack years: 15.00     Types: Cigarettes   • Smokeless tobacco: Never Used   Vaping Use   • Vaping Use: Never used   Substance and Sexual Activity   • Alcohol use: No   • Drug use: No           Objective    Physical Exam  Vitals and nursing note reviewed.   Constitutional:       Appearance: She is well-developed. She is ill-appearing.   HENT:      Head: Normocephalic and atraumatic.   Abdominal:      General: Bowel sounds are normal.      Palpations: Abdomen is soft.   Musculoskeletal:      Cervical back: Normal range of motion and neck supple.   Skin:     General: Skin is warm and dry.   Neurological:      Mental Status: She is oriented to person, place, and time.      Deep Tendon Reflexes: Reflexes are normal and symmetric.         Procedures           ED Course  ED Course as of 06/15/22 2255   Wed Agustin 15, 2022   1945 EKG interpretation time is 1931 sinus tachycardia 104 bpm QRS duration 78 QT is 338 QTC is 399 occasional PVC no acute ST elevation at this time there is nonspecific T wave changes. [MH]      ED Course User Index  [MH] Dee Mantilla DO                                                 MDM  Number of Diagnoses or Management Options  Bronchitis: new and requires workup     Amount and/or Complexity of Data Reviewed  Clinical lab tests: reviewed  Discuss the patient with other providers: yes    Risk of Complications, Morbidity, and/or Mortality  Presenting problems: moderate  Diagnostic procedures: low  Management options: moderate    Patient Progress  Patient progress: stable      Final diagnoses:   Bronchitis       ED Disposition  ED Disposition     ED Disposition   Discharge    Condition   Stable    Comment   --             [facility] Emergency Department  [address] [address] [phone]    If symptoms worsen         Medication List      New Prescriptions    doxycycline 100 MG capsule  Commonly known as: MONODOX  Take 1 capsule by mouth 2 (Two) Times a Day for 7 days.           Where to Get Your Medications      These medications were sent to [facility] [address] [address] [phone] [facility] [phone]    [address] [address] [id number]    Phone: [phone]   · doxycycline 100 MG capsule          Eddy Bee Jr., PA-C  06/15/22 5189

## 2022-06-17 ENCOUNTER — TELEPHONE (OUTPATIENT)
Dept: ONCOLOGY | Facility: CLINIC | Age: 62
End: 2022-06-17

## 2022-06-17 DIAGNOSIS — C34.31 SMALL CELL LUNG CANCER, RIGHT LOWER LOBE: Primary | ICD-10-CM

## 2022-06-17 NOTE — TELEPHONE ENCOUNTER
Caller: SARA LIVINGSTON    Relationship: Emergency Contact    Best call back number: [phone]    What is the best time to reach you: ASAP    Who are you requesting to speak with (clinical staff, provider,  specific staff member): NURSE    Do you know the name of the person who called:     What was the call regarding: PT ASKED THAT DR DAVIS CHECK OUT RECENT CT    Do you require a callback: YES

## 2022-06-17 NOTE — TELEPHONE ENCOUNTER
Returned Maris's phone call after reviewing ct scan from er with DERICK Dunn.  I advised Maris that Jahaira advised to order a pet and have her f/u with Dr. Irving after.  She verbalized understanding.

## 2022-06-23 ENCOUNTER — HOSPITAL ENCOUNTER (OUTPATIENT)
Dept: HOSPITAL 79 - RAD | Age: 62
End: 2022-06-23
Payer: MEDICARE

## 2022-06-23 DIAGNOSIS — G70.80: Primary | ICD-10-CM

## 2022-06-23 DIAGNOSIS — R13.10: ICD-10-CM

## 2022-06-28 ENCOUNTER — HOSPITAL ENCOUNTER (INPATIENT)
Dept: HOSPITAL 79 - ER1 | Age: 62
LOS: 7 days | Discharge: HOME | DRG: 871 | End: 2022-07-05
Attending: STUDENT IN AN ORGANIZED HEALTH CARE EDUCATION/TRAINING PROGRAM | Admitting: EMERGENCY MEDICINE
Payer: MEDICARE

## 2022-06-28 VITALS — HEIGHT: 66 IN | BODY MASS INDEX: 26.41 KG/M2 | WEIGHT: 164.31 LBS

## 2022-06-28 DIAGNOSIS — G70.80: ICD-10-CM

## 2022-06-28 DIAGNOSIS — I31.3: ICD-10-CM

## 2022-06-28 DIAGNOSIS — Z82.49: ICD-10-CM

## 2022-06-28 DIAGNOSIS — R53.81: ICD-10-CM

## 2022-06-28 DIAGNOSIS — R59.1: ICD-10-CM

## 2022-06-28 DIAGNOSIS — Z99.81: ICD-10-CM

## 2022-06-28 DIAGNOSIS — I42.8: ICD-10-CM

## 2022-06-28 DIAGNOSIS — Z79.82: ICD-10-CM

## 2022-06-28 DIAGNOSIS — Z98.51: ICD-10-CM

## 2022-06-28 DIAGNOSIS — J96.22: ICD-10-CM

## 2022-06-28 DIAGNOSIS — I08.1: ICD-10-CM

## 2022-06-28 DIAGNOSIS — Z74.01: ICD-10-CM

## 2022-06-28 DIAGNOSIS — Z98.890: ICD-10-CM

## 2022-06-28 DIAGNOSIS — F17.210: ICD-10-CM

## 2022-06-28 DIAGNOSIS — E03.9: ICD-10-CM

## 2022-06-28 DIAGNOSIS — I25.10: ICD-10-CM

## 2022-06-28 DIAGNOSIS — J44.1: ICD-10-CM

## 2022-06-28 DIAGNOSIS — Z20.822: ICD-10-CM

## 2022-06-28 DIAGNOSIS — J18.9: ICD-10-CM

## 2022-06-28 DIAGNOSIS — J96.21: ICD-10-CM

## 2022-06-28 DIAGNOSIS — I50.43: ICD-10-CM

## 2022-06-28 DIAGNOSIS — I27.20: ICD-10-CM

## 2022-06-28 DIAGNOSIS — A41.9: Primary | ICD-10-CM

## 2022-06-28 DIAGNOSIS — Z79.01: ICD-10-CM

## 2022-06-28 DIAGNOSIS — Z85.118: ICD-10-CM

## 2022-06-28 DIAGNOSIS — E87.6: ICD-10-CM

## 2022-06-28 DIAGNOSIS — C79.70: ICD-10-CM

## 2022-06-28 DIAGNOSIS — Z88.1: ICD-10-CM

## 2022-06-28 LAB
BUN/CREATININE RATIO: 9 (ref 0–10)
HGB BLD-MCNC: 13.3 GM/DL (ref 12.3–15.3)
RED BLOOD COUNT: 4.35 M/UL (ref 4–5.1)
WHITE BLOOD COUNT: 6.6 K/UL (ref 4.5–11)

## 2022-06-28 PROCEDURE — C1769 GUIDE WIRE: HCPCS

## 2022-06-28 PROCEDURE — B24BZZZ ULTRASONOGRAPHY OF HEART WITH AORTA: ICD-10-PCS | Performed by: INTERNAL MEDICINE

## 2022-06-28 PROCEDURE — C1894 INTRO/SHEATH, NON-LASER: HCPCS

## 2022-06-28 PROCEDURE — C1887 CATHETER, GUIDING: HCPCS

## 2022-06-29 LAB
BUN/CREATININE RATIO: 14 (ref 0–10)
HGB BLD-MCNC: 11.6 GM/DL (ref 12.3–15.3)
RED BLOOD COUNT: 3.88 M/UL (ref 4–5.1)
WHITE BLOOD COUNT: 6.8 K/UL (ref 4.5–11)

## 2022-06-30 LAB — BUN/CREATININE RATIO: 17 (ref 0–10)

## 2022-06-30 NOTE — TELEPHONE ENCOUNTER
Called and left message for Maris to call me back after discussing delay in getting pet scan on patient.  Also tried patient, no answer.

## 2022-06-30 NOTE — TELEPHONE ENCOUNTER
Called Maris today to discuss getting an EBUS done with pulmonology.  She advised patient is currently in the hospital at [facility] with CHF and needing a heart cath.  I explained to her what we wanted to get done.  She advised that she will keep me update on patient and will let me know if they biopsy it at the current hospital as they saw it there as well.

## 2022-07-01 ENCOUNTER — TELEPHONE (OUTPATIENT)
Dept: ONCOLOGY | Facility: CLINIC | Age: 62
End: 2022-07-01

## 2022-07-01 LAB
BUN/CREATININE RATIO: 27 (ref 0–10)
BUN/CREATININE RATIO: 37 (ref 0–10)

## 2022-07-01 NOTE — TELEPHONE ENCOUNTER
Called and informed Maris that PET scasns can't be done inpatient. She inquired if PET could be done closer to patients home at [facility].

## 2022-07-01 NOTE — TELEPHONE ENCOUNTER
I asked Dr. Irving and he said that the patient could have her PET done closer to home.  I will send msacacia to [facility] in scheduling to see if he can get her set up at [facility] for PET.

## 2022-07-01 NOTE — TELEPHONE ENCOUNTER
Pts  wanted to wait until Tuesday to r/s anything. Pt is supposed to have a heart cath Tuesday. And we go from there.   Thanks

## 2022-07-01 NOTE — TELEPHONE ENCOUNTER
Caller: YARA SARA    Relationship: Emergency Contact    Best call back number:[phone]     What orders are you requesting (i.e. lab or imaging): July 14 PET SCAN    In what timeframe would the patient need to come in: NEXT AVAILABLE    Where will you receive your lab/imaging services: [facility]  FX#[phone]    Additional notes: PATIENT IS CURRENTLY IN PATIENT AT [facility]. CALLING TO REQUEST IF PET SCAN CAN BE COMPLETED THERE AND CANCEL 7/14/22 APPT

## 2022-07-02 LAB
BUN/CREATININE RATIO: 42 (ref 0–10)
BUN/CREATININE RATIO: 44 (ref 0–10)
HGB BLD-MCNC: 11.9 GM/DL (ref 12.3–15.3)
RED BLOOD COUNT: 3.96 M/UL (ref 4–5.1)
WHITE BLOOD COUNT: 9.9 K/UL (ref 4.5–11)

## 2022-07-03 LAB
BUN/CREATININE RATIO: 35 (ref 0–10)
HGB BLD-MCNC: 12.7 GM/DL (ref 12.3–15.3)
RED BLOOD COUNT: 4.13 M/UL (ref 4–5.1)
WHITE BLOOD COUNT: 8 K/UL (ref 4.5–11)

## 2022-07-04 LAB
BUN/CREATININE RATIO: 36 (ref 0–10)
BUN/CREATININE RATIO: 41 (ref 0–10)
HGB BLD-MCNC: 11.9 GM/DL (ref 12.3–15.3)
RED BLOOD COUNT: 3.98 M/UL (ref 4–5.1)
WHITE BLOOD COUNT: 7.6 K/UL (ref 4.5–11)

## 2022-07-04 PROCEDURE — 4A023N7 MEASUREMENT OF CARDIAC SAMPLING AND PRESSURE, LEFT HEART, PERCUTANEOUS APPROACH: ICD-10-PCS | Performed by: INTERNAL MEDICINE

## 2022-07-04 PROCEDURE — B2111ZZ FLUOROSCOPY OF MULTIPLE CORONARY ARTERIES USING LOW OSMOLAR CONTRAST: ICD-10-PCS | Performed by: INTERNAL MEDICINE

## 2022-07-05 ENCOUNTER — TELEPHONE (OUTPATIENT)
Dept: ONCOLOGY | Facility: CLINIC | Age: 62
End: 2022-07-05

## 2022-07-05 LAB
BUN/CREATININE RATIO: 51 (ref 0–10)
HGB BLD-MCNC: 13.2 GM/DL (ref 12.3–15.3)
RED BLOOD COUNT: 4.39 M/UL (ref 4–5.1)
WHITE BLOOD COUNT: 6.8 K/UL (ref 4.5–11)

## 2022-07-05 NOTE — TELEPHONE ENCOUNTER
Caller: SARA LIVINGSTON    Relationship to patient: Emergency Contact    Best call back number: [phone]    Chief complaint: PATIENT TO RESCHEDULE 7/14/22 PET SCAN     Type of visit:PET SCAN    Requested date: PLEASE RESCHEDULE FOR NEXT AVAILABLE IN [place]

## 2022-07-05 NOTE — TELEPHONE ENCOUNTER
Canceled Pet for Teddy re routed it to Deep stating we need it prior to her follow up on 7/20 if available.

## 2022-07-06 ENCOUNTER — NURSE NAVIGATOR (OUTPATIENT)
Dept: ONCOLOGY | Facility: CLINIC | Age: 62
End: 2022-07-06

## 2022-07-06 NOTE — PROGRESS NOTES
Called patient home phone and left voicemail. Called patient mobile, voicemail box full. Called daughter. She states patient was discharged yesterday from [facility] with CHF. Per Dr. Reynaga he can either see patient in clinic Friday or proceed with EBUS next week. Daughter will discuss with patient and notify navigator of decision. Return contact information provided.

## 2022-07-08 ENCOUNTER — HOSPITAL ENCOUNTER (OUTPATIENT)
Dept: PET IMAGING | Facility: HOSPITAL | Age: 62
Discharge: HOME | End: 2022-07-08
Admitting: INTERNAL MEDICINE

## 2022-07-08 DIAGNOSIS — C34.31 SMALL CELL LUNG CANCER, RIGHT LOWER LOBE: ICD-10-CM

## 2022-07-08 PROCEDURE — 0 FLUDEOXYGLUCOSE F18 SOLUTION: Performed by: INTERNAL MEDICINE

## 2022-07-08 PROCEDURE — A9552 F18 FDG: HCPCS | Performed by: INTERNAL MEDICINE

## 2022-07-08 PROCEDURE — 78815 PET IMAGE W/CT SKULL-THIGH: CPT

## 2022-07-08 PROCEDURE — 78815 PET IMAGE W/CT SKULL-THIGH: CPT | Performed by: RADIOLOGY

## 2022-07-08 RX ADMIN — FLUDEOXYGLUCOSE F18 1 DOSE: 300 INJECTION INTRAVENOUS at 09:37

## 2022-07-13 ENCOUNTER — OFFICE VISIT (OUTPATIENT)
Dept: ONCOLOGY | Facility: CLINIC | Age: 62
End: 2022-07-13

## 2022-07-13 ENCOUNTER — PREP FOR SURGERY (OUTPATIENT)
Dept: OTHER | Facility: HOSPITAL | Age: 62
End: 2022-07-13

## 2022-07-13 VITALS
TEMPERATURE: 97.1 F | WEIGHT: 157 LBS | DIASTOLIC BLOOD PRESSURE: 50 MMHG | SYSTOLIC BLOOD PRESSURE: 87 MMHG | BODY MASS INDEX: 25.23 KG/M2 | HEIGHT: 66 IN | HEART RATE: 85 BPM | OXYGEN SATURATION: 98 % | RESPIRATION RATE: 16 BRPM

## 2022-07-13 DIAGNOSIS — C34.31 SMALL CELL LUNG CANCER, RIGHT LOWER LOBE: Primary | ICD-10-CM

## 2022-07-13 PROCEDURE — 99214 OFFICE O/P EST MOD 30 MIN: CPT | Performed by: INTERNAL MEDICINE

## 2022-07-13 RX ORDER — DIGOXIN 250 UG/1
TABLET ORAL
COMMUNITY
Start: 2022-07-05 | End: 2022-10-12 | Stop reason: SDUPTHER

## 2022-07-13 RX ORDER — METOPROLOL SUCCINATE 50 MG/1
50 TABLET, EXTENDED RELEASE ORAL DAILY
COMMUNITY
Start: 2022-07-05

## 2022-07-13 RX ORDER — IPRATROPIUM BROMIDE AND ALBUTEROL SULFATE 2.5; .5 MG/3ML; MG/3ML
SOLUTION RESPIRATORY (INHALATION)
COMMUNITY
Start: 2022-07-05

## 2022-07-13 RX ORDER — ATORVASTATIN CALCIUM 20 MG/1
20 TABLET, FILM COATED ORAL NIGHTLY
COMMUNITY
Start: 2022-06-16

## 2022-07-13 RX ORDER — ALBUTEROL SULFATE 90 UG/1
2 AEROSOL, METERED RESPIRATORY (INHALATION) EVERY 4 HOURS PRN
COMMUNITY
Start: 2022-07-05

## 2022-07-13 RX ORDER — FUROSEMIDE 20 MG/1
20 TABLET ORAL DAILY
COMMUNITY
Start: 2022-07-05

## 2022-07-13 RX ORDER — BUDESONIDE 0.5 MG/2ML
0.5 INHALANT ORAL
COMMUNITY
Start: 2022-07-05

## 2022-07-13 RX ORDER — SPIRONOLACTONE 25 MG/1
25 TABLET ORAL DAILY
COMMUNITY
Start: 2022-07-05

## 2022-07-13 NOTE — PROGRESS NOTES
DATE OF VISIT: 7/13/2022    REASON FOR VISIT: Followup for right lower lobe small cell lung cancer     PROBLEM LIST:  1. Right lower lobe small cell lung cancer T1 N1 M0 stage IIb:  A.  Presented with ataxia and lower extremities weakness  B.  Negative bronchoscopy August 19, 2020 and endobronchial ultrasound September 9, 2020  C.  CT chest done December 7, 2020 revealed 2.7 cm right hilar mass  D.  Whole-body PET scan done January 11, 2020 revealed isolated hypermetabolic activity at the right hilar mass SUV of 12  E. positive serology for AGN A-1 antibody which is 92% predicted for small cell lung cancer, positive N type calcium channel blocker and P/Q type calcium channel blooker.  F.  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  Hypertension  4.  Hypercholesteremia      HISTORY OF PRESENT ILLNESS: The patient is a very pleasant 62 y.o. female  with past medical history significant for suspected small cell lung cancer of the right lung. The patient never had a positive biopsy however her serology was suggestive of SCLCA after presenting with Lambert-Eaton syndrome. The patient is here today for scheduled follow up visit.     SUBJECTIVE: The patient is here today with her .  She is doing fairly well.  She is any fever chills night sweats.  She still complains of fatigue.  She is anxious about the scan results.    Past History:  Medical History: has a past medical history of Arthritis, Heart disease, Hyperlipidemia, Lambert-Eaton syndrome (HCC), and Lung cancer (HCC).   Surgical History: has a past surgical history that includes Hysterectomy; Foot surgery; and Knee surgery.   Family History: family history includes Brain cancer in her father; Breast cancer in her sister; Heart disease in her brother, mother, and sister; Kidney disease in her mother.   Social History: reports that she has been smoking cigarettes. She has a  "15.00 pack-year smoking history. She has never used smokeless tobacco. She reports that she does not drink alcohol and does not use drugs.    (Not in a hospital admission)     Allergies: Erythromycin     Review of Systems   Constitutional: Positive for fatigue.   Respiratory: Positive for cough.    Cardiovascular:        She is wearing a life vest   Gastrointestinal: Negative.    Musculoskeletal: Negative.    Neurological: Positive for weakness.         Current Outpatient Medications:   •  albuterol sulfate  (90 Base) MCG/ACT inhaler, Inhale 2 puffs Every 4 (Four) Hours As Needed., Disp: , Rfl:   •  Amifampridine Phosphate 10 MG tablet, Take 20 mg by mouth 4 (Four) Times a Day., Disp: , Rfl:   •  aspirin 81 MG EC tablet, Take 81 mg by mouth Daily., Disp: , Rfl:   •  atorvastatin (LIPITOR) 20 MG tablet, Take 20 mg by mouth Every Night., Disp: , Rfl:   •  budesonide (PULMICORT) 0.5 MG/2ML nebulizer solution, Take 0.5 mg by nebulization Daily., Disp: , Rfl:   •  Digox 250 MCG tablet, Take  by mouth Daily., Disp: , Rfl:   •  furosemide (LASIX) 20 MG tablet, Take 20 mg by mouth Daily., Disp: , Rfl:   •  ipratropium-albuterol (DUO-NEB) 0.5-2.5 mg/3 ml nebulizer, , Disp: , Rfl:   •  levothyroxine (SYNTHROID, LEVOTHROID) 150 MCG tablet, Take 150 mcg by mouth Daily., Disp: , Rfl:   •  metoprolol succinate XL (TOPROL-XL) 50 MG 24 hr tablet, Take 50 mg by mouth Daily., Disp: , Rfl:   •  spironolactone (ALDACTONE) 25 MG tablet, Take 25 mg by mouth Daily., Disp: , Rfl:     PHYSICAL EXAMINATION:   BP (!) 87/50   Pulse 85   Temp 97.1 °F (36.2 °C) (Temporal)   Resp 16   Ht 167.6 cm (66\")   Wt 71.2 kg (157 lb)   SpO2 98%   BMI 25.34 kg/m²    Pain Score    07/13/22 1009   PainSc: 0-No pain        ECOG score: 1            ECOG Performance Status: 1 - Symptomatic but completely ambulatory  General Appearance:  alert, cooperative, no apparent distress and appears stated age   Neurologic/Psychiatric: A&O x 3, gait steady, " appropriate affect, strength 5/5 in all muscle groups   HEENT:  Normocephalic, without obvious abnormality, mucous membranes moist   Neck: Supple, symmetrical, trachea midline, no adenopathy;  No thyromegaly, masses, or tenderness   Lungs:   Clear to auscultation bilaterally; respirations regular, even, and unlabored bilaterally   Heart:  Regular rate and rhythm, no murmurs appreciated   Abdomen:   Soft, non-tender, non-distended and no organomegaly   Lymph nodes: No cervical, supraclavicular, inguinal or axillary adenopathy noted   Extremities: Normal, atraumatic; no clubbing, cyanosis, or edema    Skin: No rashes, ulcers, or suspicious lesions noted     No visits with results within 2 Week(s) from this visit.   Latest known visit with results is:   Admission on 06/15/2022, Discharged on 06/15/2022   Component Date Value Ref Range Status   • Extra Tube 06/15/2022 Hold for add-ons.   Final    Auto resulted.   • Extra Tube 06/15/2022 hold for add-on   Final    Auto resulted   • Extra Tube 06/15/2022 Hold for add-ons.   Final    Auto resulted.   • Extra Tube 06/15/2022 Hold for add-ons.   Final    Auto resulted   • WBC 06/15/2022 6.98  3.40 - 10.80 10*3/mm3 Final   • RBC 06/15/2022 4.28  3.77 - 5.28 10*6/mm3 Final   • Hemoglobin 06/15/2022 12.8  12.0 - 15.9 g/dL Final   • Hematocrit 06/15/2022 38.8  34.0 - 46.6 % Final   • MCV 06/15/2022 90.7  79.0 - 97.0 fL Final   • MCH 06/15/2022 29.9  26.6 - 33.0 pg Final   • MCHC 06/15/2022 33.0  31.5 - 35.7 g/dL Final   • RDW 06/15/2022 14.5  12.3 - 15.4 % Final   • RDW-SD 06/15/2022 48.1  37.0 - 54.0 fl Final   • MPV 06/15/2022 9.4  6.0 - 12.0 fL Final   • Platelets 06/15/2022 294  140 - 450 10*3/mm3 Final   • Neutrophil % 06/15/2022 68.2  42.7 - 76.0 % Final   • Lymphocyte % 06/15/2022 22.5  19.6 - 45.3 % Final   • Monocyte % 06/15/2022 6.4  5.0 - 12.0 % Final   • Eosinophil % 06/15/2022 1.7  0.3 - 6.2 % Final   • Basophil % 06/15/2022 0.9  0.0 - 1.5 % Final   • Immature Grans  % 06/15/2022 0.3  0.0 - 0.5 % Final   • Neutrophils, Absolute 06/15/2022 4.76  1.70 - 7.00 10*3/mm3 Final   • Lymphocytes, Absolute 06/15/2022 1.57  0.70 - 3.10 10*3/mm3 Final   • Monocytes, Absolute 06/15/2022 0.45  0.10 - 0.90 10*3/mm3 Final   • Eosinophils, Absolute 06/15/2022 0.12  0.00 - 0.40 10*3/mm3 Final   • Basophils, Absolute 06/15/2022 0.06  0.00 - 0.20 10*3/mm3 Final   • Immature Grans, Absolute 06/15/2022 0.02  0.00 - 0.05 10*3/mm3 Final   • nRBC 06/15/2022 0.0  0.0 - 0.2 /100 WBC Final   • Glucose 06/15/2022 107 (A) 65 - 99 mg/dL Final   • BUN 06/15/2022 10  8 - 23 mg/dL Final   • Creatinine 06/15/2022 0.66  0.57 - 1.00 mg/dL Final   • Sodium 06/15/2022 137  136 - 145 mmol/L Final   • Potassium 06/15/2022 3.8  3.5 - 5.2 mmol/L Final   • Chloride 06/15/2022 96 (A) 98 - 107 mmol/L Final   • CO2 06/15/2022 31.9 (A) 22.0 - 29.0 mmol/L Final   • Calcium 06/15/2022 9.8  8.6 - 10.5 mg/dL Final   • Total Protein 06/15/2022 7.3  6.0 - 8.5 g/dL Final   • Albumin 06/15/2022 3.50  3.50 - 5.20 g/dL Final   • ALT (SGPT) 06/15/2022 8  1 - 33 U/L Final   • AST (SGOT) 06/15/2022 11  1 - 32 U/L Final   • Alkaline Phosphatase 06/15/2022 87  39 - 117 U/L Final   • Total Bilirubin 06/15/2022 0.2  0.0 - 1.2 mg/dL Final   • Globulin 06/15/2022 3.8  gm/dL Final   • A/G Ratio 06/15/2022 0.9  g/dL Final   • BUN/Creatinine Ratio 06/15/2022 15.2  7.0 - 25.0 Final   • Anion Gap 06/15/2022 9.1  5.0 - 15.0 mmol/L Final   • eGFR 06/15/2022 99.9  >60.0 mL/min/1.73 Final    National Kidney Foundation and American Society of Nephrology (ASN) Task Force recommended calculation based on the Chronic Kidney Disease Epidemiology Collaboration (CKD-EPI) equation refit without adjustment for race.   • Troponin T 06/15/2022 <0.010  0.000 - 0.030 ng/mL Final   • Magnesium 06/15/2022 2.0  1.6 - 2.4 mg/dL Final   • Lactate 06/15/2022 0.7  0.5 - 2.0 mmol/L Final   • Procalcitonin 06/15/2022 0.06  0.00 - 0.25 ng/mL Final        CT Angiogram Chest  Pulmonary Embolism    Result Date: 6/15/2022  Narrative: FINAL REPORT TECHNIQUE: Multiple axial CT images were obtained through the chest following IV contrast using a CTA/PE protocol.  3D/MIP reconstruction images were also performed. This study was performed with techniques to keep radiation doses as low as reasonably achievable (ALARA). Individualized dose reduction techniques using automated exposure control or adjustment of mA and/or kV according to the patient's size were employed. CLINICAL HISTORY: soa FINDINGS: PAs and aorta: No pulmonary embolus.  Thoracic aorta is unremarkable.   There is coronary artery disease. Heart/mediastinum: No evidence for right heart strain.  There is a small pericardial effusion.  There is cardiomegaly.  Lungs: There is severe centrilobular emphysema.  There is an ill-defined infiltrating right perihilar soft tissue measuring about 2.7 cm x 1.4 cm, best visualized on image 34 of series 5. Lymph nodes: There is a prominent subcarinal lymph node measuring 2.2 cm x 1.3 cm.  Pleura: No pneumothorax or pleural effusion.  Chest Wall: No chest wall contusion.  Bones: No acute fracture.  Upper abdomen: No acute findings in the upper abdomen.     Impression: No pulmonary embolus.   Infiltrating right perihilar soft tissue is somewhat suspicious for underlying malignancy.  Large subcarinal lymph node could represent metastatic disease, PET/CT is recommended. Authenticated and Electronically Signed by Nahum Villafuerte MD on 06/15/2022 10:03:01 PM    NM PET/CT Skull Base to Mid Thigh    Result Date: 7/8/2022  Narrative: NUCLEAR MEDICINE PET/CT SKULL BASE TO MID THIGH  CLINICAL INDICATION: New lymph node on recent CT, restaging scans; C34.31-Malignant neoplasm of lower lobe, right bronchus or lung.   COMPARISON: None available.  TECHNIQUE: 13.06 mCi FDG intravenously at a blood glucose of 105 mg/dl, followed by an incubation period of 65 minutes. Routine whole torso PET was performed. A low-dose  CT was performed for attenuation correction and anatomic localization (only), without oral contrast. Standardized uptake value (SUV) was measured at selected sites. Unless otherwise specified, all SUVs refer to maximum value in the target area (mSUV) and all measurements refer to maximum axial dimensions or diameter. Measurements based on the low-dose CT are considered to be approximate.  FINDINGS:  HEAD/NECK: No FDG hypermetabolic neck adenopathy. No FDG hypermetabolic masses.  CHEST: No FDG hypermetabolic thoracic adenopathy. No FDG hypermetabolic lung nodules or masses. Evaluation for tiny parenchymal nodules is somewhat limited on low dose CT secondary to respiratory motion.  ABDOMEN/PELVIS: Physiologic FDG hypermetabolism seen throughout the solid abdominal organs. There is intense abnormal hypermetabolic activity corresponding to a soft tissue density anterior to the right kidney on image 222 of the axial series. The maximum SUV is 12.4. Physiologic FDG hypermetabolism seen throughout the mesentery, retroperitoneum and pelvis.  BONES: There are scattered foci of FDG hypermetabolism most suggestive of degenerative change seen throughout the axial skeleton. If concern persist for metastatic lesions of the bone, HDP Bone scan is recommended for further evaluation.      Impression: 1. Intense abnormal hypermetabolic activity corresponding to a soft tissue density anterior to the right kidney. Maximum SUV is 12.4. The mass/adenopathy measures 3.02 x 2.81 cm 2. No other areas of abnormal hypermetabolic activity.  Comment: Please note that the low dose CT was performed to facilitate PET image reconstruction and anatomic localization and does not replace a diagnostic CT.  This report was finalized on 7/8/2022 2:40 PM by Dr. Chase Blas MD.        ASSESSMENT: The patient is a very pleasant 62 y.o. female  with small cell lung cancer      PLAN:    1. Right lower lobe small cell lung cancer:  A.  I did go over her CT  scan results from 2/15/2022.  I did review her right hilar mass with subcarinal lymph node.  I reviewed the films myself.  B.  I did go over the whole body PET scan done July 8, 2022 surprisingly it did not have any hypermetabolic activity in the chest or mediastinum however it did reveal isolated high SUV 12.4 and 3 cm anterior to the right kidney lesion.  C.  I discussed the case with Dr. Reynaga from pulmonary who is willing to do bronchoscopy with EBUS.  Another option is to do CT-guided biopsy of the lesion next of the right kidney however given the patient poor current performance status and multiple comorbidities including poor cardiac function with a EF of 25% the patient is reluctant to proceed with any procedure at this time which is very understandable.  D.  The patient will follow-up with me in 3 months.  CT chest and abdomen.  If she has persistent or progressive abnormalities we will arrange for biopsy at that point and other hand if things are stable continue watchful waiting.    2. Lambert-Eaton syndrome:  A. She will continue Firdapse 10 mg 4 times daily as prescribed by neurology.    3. Hypothyroid:  A. She will continue Synthroid daily    4.  Poor systolic cardiac function:  A.  The patient is scheduled for defibrillator placement.    FOLLOW UP: 3 months with CT chest.     Susi Irving MD  7/13/2022

## 2022-07-14 ENCOUNTER — APPOINTMENT (OUTPATIENT)
Dept: PET IMAGING | Facility: HOSPITAL | Age: 62
End: 2022-07-14

## 2022-07-15 ENCOUNTER — APPOINTMENT (OUTPATIENT)
Dept: PET IMAGING | Facility: HOSPITAL | Age: 62
End: 2022-07-15

## 2022-08-01 ENCOUNTER — HOSPITAL ENCOUNTER (OUTPATIENT)
Dept: HOSPITAL 79 - HEART 5 | Age: 62
End: 2022-08-01
Attending: INTERNAL MEDICINE
Payer: MEDICARE

## 2022-08-01 DIAGNOSIS — R06.00: Primary | ICD-10-CM

## 2022-08-04 ENCOUNTER — HOSPITAL ENCOUNTER (OUTPATIENT)
Dept: CT IMAGING | Facility: HOSPITAL | Age: 62
End: 2022-08-04

## 2022-08-04 ENCOUNTER — APPOINTMENT (OUTPATIENT)
Dept: CT IMAGING | Facility: HOSPITAL | Age: 62
End: 2022-08-04

## 2022-08-10 ENCOUNTER — HOSPITAL ENCOUNTER (INPATIENT)
Dept: HOSPITAL 79 - ER1 | Age: 62
LOS: 5 days | Discharge: HOME | DRG: 177 | End: 2022-08-15
Attending: INTERNAL MEDICINE | Admitting: INTERNAL MEDICINE
Payer: MEDICARE

## 2022-08-10 VITALS — BODY MASS INDEX: 24.75 KG/M2 | WEIGHT: 154 LBS | HEIGHT: 66 IN

## 2022-08-10 DIAGNOSIS — F17.210: ICD-10-CM

## 2022-08-10 DIAGNOSIS — Z79.82: ICD-10-CM

## 2022-08-10 DIAGNOSIS — Z98.51: ICD-10-CM

## 2022-08-10 DIAGNOSIS — Z98.890: ICD-10-CM

## 2022-08-10 DIAGNOSIS — E03.9: ICD-10-CM

## 2022-08-10 DIAGNOSIS — J96.21: ICD-10-CM

## 2022-08-10 DIAGNOSIS — R53.81: ICD-10-CM

## 2022-08-10 DIAGNOSIS — Z85.828: ICD-10-CM

## 2022-08-10 DIAGNOSIS — I25.10: ICD-10-CM

## 2022-08-10 DIAGNOSIS — U07.1: Primary | ICD-10-CM

## 2022-08-10 DIAGNOSIS — C79.70: ICD-10-CM

## 2022-08-10 DIAGNOSIS — G70.80: ICD-10-CM

## 2022-08-10 DIAGNOSIS — I50.22: ICD-10-CM

## 2022-08-10 DIAGNOSIS — I11.0: ICD-10-CM

## 2022-08-10 DIAGNOSIS — Z82.0: ICD-10-CM

## 2022-08-10 DIAGNOSIS — Z95.0: ICD-10-CM

## 2022-08-10 DIAGNOSIS — Z88.1: ICD-10-CM

## 2022-08-10 DIAGNOSIS — J15.9: ICD-10-CM

## 2022-08-10 DIAGNOSIS — J44.1: ICD-10-CM

## 2022-08-10 LAB
BUN/CREATININE RATIO: 8 (ref 0–10)
HGB BLD-MCNC: 13.3 GM/DL (ref 12.3–15.3)
RED BLOOD COUNT: 4.43 M/UL (ref 4–5.1)
WHITE BLOOD COUNT: 5.9 K/UL (ref 4.5–11)

## 2022-08-10 PROCEDURE — 8E0ZXY6 ISOLATION: ICD-10-PCS | Performed by: INTERNAL MEDICINE

## 2022-08-10 PROCEDURE — 3E0333Z INTRODUCTION OF ANTI-INFLAMMATORY INTO PERIPHERAL VEIN, PERCUTANEOUS APPROACH: ICD-10-PCS | Performed by: INTERNAL MEDICINE

## 2022-08-10 PROCEDURE — 5A0935A ASSISTANCE WITH RESPIRATORY VENTILATION, LESS THAN 24 CONSECUTIVE HOURS, HIGH NASAL FLOW/VELOCITY: ICD-10-PCS | Performed by: INTERNAL MEDICINE

## 2022-08-10 PROCEDURE — XW033E5 INTRODUCTION OF REMDESIVIR ANTI-INFECTIVE INTO PERIPHERAL VEIN, PERCUTANEOUS APPROACH, NEW TECHNOLOGY GROUP 5: ICD-10-PCS | Performed by: INTERNAL MEDICINE

## 2022-08-11 LAB
BUN/CREATININE RATIO: 22 (ref 0–10)
HGB BLD-MCNC: 12 GM/DL (ref 12.3–15.3)
RED BLOOD COUNT: 4.08 M/UL (ref 4–5.1)
WHITE BLOOD COUNT: 6.6 K/UL (ref 4.5–11)

## 2022-08-12 LAB
BUN/CREATININE RATIO: 21 (ref 0–10)
HGB BLD-MCNC: 11.7 GM/DL (ref 12.3–15.3)
RED BLOOD COUNT: 3.89 M/UL (ref 4–5.1)
WHITE BLOOD COUNT: 8.9 K/UL (ref 4.5–11)

## 2022-08-13 LAB
BUN/CREATININE RATIO: 17 (ref 0–10)
HGB BLD-MCNC: 11.9 GM/DL (ref 12.3–15.3)
RED BLOOD COUNT: 3.96 M/UL (ref 4–5.1)
WHITE BLOOD COUNT: 6.3 K/UL (ref 4.5–11)

## 2022-08-14 LAB
BUN/CREATININE RATIO: 25 (ref 0–10)
HGB BLD-MCNC: 11.7 GM/DL (ref 12.3–15.3)
RED BLOOD COUNT: 4.02 M/UL (ref 4–5.1)
WHITE BLOOD COUNT: 4.6 K/UL (ref 4.5–11)

## 2022-08-18 ENCOUNTER — HOSPITAL ENCOUNTER (OUTPATIENT)
Dept: CT IMAGING | Facility: HOSPITAL | Age: 62
End: 2022-08-18

## 2022-08-18 ENCOUNTER — APPOINTMENT (OUTPATIENT)
Dept: CT IMAGING | Facility: HOSPITAL | Age: 62
End: 2022-08-18

## 2022-10-10 ENCOUNTER — HOSPITAL ENCOUNTER (OUTPATIENT)
Dept: CT IMAGING | Facility: HOSPITAL | Age: 62
Discharge: HOME | End: 2022-10-10
Admitting: INTERNAL MEDICINE

## 2022-10-10 DIAGNOSIS — C34.31 SMALL CELL LUNG CANCER, RIGHT LOWER LOBE: ICD-10-CM

## 2022-10-10 PROCEDURE — 25010000002 IOPAMIDOL 61 % SOLUTION: Performed by: INTERNAL MEDICINE

## 2022-10-10 PROCEDURE — 71260 CT THORAX DX C+: CPT

## 2022-10-10 RX ADMIN — IOPAMIDOL 100 ML: 612 INJECTION, SOLUTION INTRAVENOUS at 07:29

## 2022-10-11 NOTE — PROGRESS NOTES
DATE OF VISIT: 10/12/2022    REASON FOR VISIT: Followup for right lower lobe small cell lung cancer     PROBLEM LIST:  1. Right lower lobe small cell lung cancer T1 N1 M0 stage IIb:  A.  Presented with ataxia and lower extremities weakness  B.  Negative bronchoscopy August 19, 2020 and endobronchial ultrasound September 9, 2020  C.  CT chest done December 7, 2020 revealed 2.7 cm right hilar mass  D.  Whole-body PET scan done January 11, 2020 revealed isolated hypermetabolic activity at the right hilar mass SUV of 12  E. positive serology for AGN A-1 antibody which is 92% predicted for small cell lung cancer, positive N type calcium channel blocker and P/Q type calcium channel blooker.  F.  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  Hypertension  4.  Hypercholesteremia      HISTORY OF PRESENT ILLNESS: The patient is a very pleasant 62 y.o. female  with past medical history significant for suspected small cell lung cancer of the right lung. The patient never had a positive biopsy however her serology was suggestive of SCLCA after presenting with Lambert-Eaton syndrome. The patient is here today for scheduled follow up visit.     SUBJECTIVE: Gayatri is here today with her .  She is feeling better.  She did quit smoking 2 weeks ago.  She is anxious about the scan results.    Past History:  Medical History: has a past medical history of Arthritis, Heart disease, Hyperlipidemia, Lambert-Eaton syndrome (HCC), and Lung cancer (HCC).   Surgical History: has a past surgical history that includes Hysterectomy; Foot surgery; and Knee surgery.   Family History: family history includes Brain cancer in her father; Breast cancer in her sister; Heart disease in her brother, mother, and sister; Kidney disease in her mother.   Social History: reports that she has quit smoking. Her smoking use included cigarettes. She has a 15.00 pack-year  "smoking history. She has never used smokeless tobacco. She reports that she does not drink alcohol and does not use drugs.    (Not in a hospital admission)     Allergies: Erythromycin     Review of Systems   Constitutional: Positive for fatigue.   Respiratory: Negative.    Cardiovascular: Negative.         She is wearing a life vest   Gastrointestinal: Negative.          Current Outpatient Medications:   •  albuterol sulfate  (90 Base) MCG/ACT inhaler, Inhale 2 puffs Every 4 (Four) Hours As Needed., Disp: , Rfl:   •  Amifampridine Phosphate 10 MG tablet, Take 20 mg by mouth 4 (Four) Times a Day., Disp: , Rfl:   •  aspirin 81 MG EC tablet, Take 81 mg by mouth Daily., Disp: , Rfl:   •  atorvastatin (LIPITOR) 20 MG tablet, Take 20 mg by mouth Every Night., Disp: , Rfl:   •  budesonide (PULMICORT) 0.5 MG/2ML nebulizer solution, Take 0.5 mg by nebulization Daily., Disp: , Rfl:   •  cefdinir (OMNICEF) 300 MG capsule, , Disp: , Rfl:   •  dexamethasone (DECADRON) 4 MG tablet, , Disp: , Rfl:   •  digoxin (LANOXIN) 250 MCG tablet, Take  by mouth Daily., Disp: , Rfl:   •  furosemide (LASIX) 20 MG tablet, Take 20 mg by mouth Daily., Disp: , Rfl:   •  ipratropium-albuterol (DUO-NEB) 0.5-2.5 mg/3 ml nebulizer, , Disp: , Rfl:   •  levothyroxine (SYNTHROID, LEVOTHROID) 150 MCG tablet, Take 150 mcg by mouth Daily., Disp: , Rfl:   •  metoprolol succinate XL (TOPROL-XL) 50 MG 24 hr tablet, Take 50 mg by mouth Daily., Disp: , Rfl:   •  pantoprazole (PROTONIX) 40 MG EC tablet, Take 1 tablet by mouth Daily., Disp: , Rfl:   •  potassium chloride (K-DUR,KLOR-CON) 20 MEQ CR tablet, , Disp: , Rfl:   •  spironolactone (ALDACTONE) 25 MG tablet, Take 25 mg by mouth Daily., Disp: , Rfl:     PHYSICAL EXAMINATION:   BP 98/64   Pulse 92   Temp 97.4 °F (36.3 °C) (Temporal)   Resp 16   Ht 167.6 cm (66\")   Wt 77.6 kg (171 lb)   SpO2 94%   BMI 27.60 kg/m²    Pain Score    10/12/22 0943   PainSc: 0-No pain                     ECOG " Performance Status: 1 - Symptomatic but completely ambulatory  General Appearance:  alert, cooperative, no apparent distress and appears stated age   Neurologic/Psychiatric: A&O x 3, gait steady, appropriate affect, strength 5/5 in all muscle groups   HEENT:  Normocephalic, without obvious abnormality, mucous membranes moist   Neck: Supple, symmetrical, trachea midline, no adenopathy;  No thyromegaly, masses, or tenderness   Lungs:   Clear to auscultation bilaterally; respirations regular, even, and unlabored bilaterally   Heart:  Regular rate and rhythm, no murmurs appreciated   Abdomen:   Soft, non-tender, non-distended and no organomegaly   Lymph nodes: No cervical, supraclavicular, inguinal or axillary adenopathy noted   Extremities: Normal, atraumatic; no clubbing, cyanosis, or edema    Skin: No rashes, ulcers, or suspicious lesions noted     No visits with results within 2 Week(s) from this visit.   Latest known visit with results is:   Admission on 06/15/2022, Discharged on 06/15/2022   Component Date Value Ref Range Status   • Extra Tube 06/15/2022 Hold for add-ons.   Final    Auto resulted.   • Extra Tube 06/15/2022 hold for add-on   Final    Auto resulted   • Extra Tube 06/15/2022 Hold for add-ons.   Final    Auto resulted.   • Extra Tube 06/15/2022 Hold for add-ons.   Final    Auto resulted   • WBC 06/15/2022 6.98  3.40 - 10.80 10*3/mm3 Final   • RBC 06/15/2022 4.28  3.77 - 5.28 10*6/mm3 Final   • Hemoglobin 06/15/2022 12.8  12.0 - 15.9 g/dL Final   • Hematocrit 06/15/2022 38.8  34.0 - 46.6 % Final   • MCV 06/15/2022 90.7  79.0 - 97.0 fL Final   • MCH 06/15/2022 29.9  26.6 - 33.0 pg Final   • MCHC 06/15/2022 33.0  31.5 - 35.7 g/dL Final   • RDW 06/15/2022 14.5  12.3 - 15.4 % Final   • RDW-SD 06/15/2022 48.1  37.0 - 54.0 fl Final   • MPV 06/15/2022 9.4  6.0 - 12.0 fL Final   • Platelets 06/15/2022 294  140 - 450 10*3/mm3 Final   • Neutrophil % 06/15/2022 68.2  42.7 - 76.0 % Final   • Lymphocyte %  06/15/2022 22.5  19.6 - 45.3 % Final   • Monocyte % 06/15/2022 6.4  5.0 - 12.0 % Final   • Eosinophil % 06/15/2022 1.7  0.3 - 6.2 % Final   • Basophil % 06/15/2022 0.9  0.0 - 1.5 % Final   • Immature Grans % 06/15/2022 0.3  0.0 - 0.5 % Final   • Neutrophils, Absolute 06/15/2022 4.76  1.70 - 7.00 10*3/mm3 Final   • Lymphocytes, Absolute 06/15/2022 1.57  0.70 - 3.10 10*3/mm3 Final   • Monocytes, Absolute 06/15/2022 0.45  0.10 - 0.90 10*3/mm3 Final   • Eosinophils, Absolute 06/15/2022 0.12  0.00 - 0.40 10*3/mm3 Final   • Basophils, Absolute 06/15/2022 0.06  0.00 - 0.20 10*3/mm3 Final   • Immature Grans, Absolute 06/15/2022 0.02  0.00 - 0.05 10*3/mm3 Final   • nRBC 06/15/2022 0.0  0.0 - 0.2 /100 WBC Final   • Glucose 06/15/2022 107 (H)  65 - 99 mg/dL Final   • BUN 06/15/2022 10  8 - 23 mg/dL Final   • Creatinine 06/15/2022 0.66  0.57 - 1.00 mg/dL Final   • Sodium 06/15/2022 137  136 - 145 mmol/L Final   • Potassium 06/15/2022 3.8  3.5 - 5.2 mmol/L Final   • Chloride 06/15/2022 96 (L)  98 - 107 mmol/L Final   • CO2 06/15/2022 31.9 (H)  22.0 - 29.0 mmol/L Final   • Calcium 06/15/2022 9.8  8.6 - 10.5 mg/dL Final   • Total Protein 06/15/2022 7.3  6.0 - 8.5 g/dL Final   • Albumin 06/15/2022 3.50  3.50 - 5.20 g/dL Final   • ALT (SGPT) 06/15/2022 8  1 - 33 U/L Final   • AST (SGOT) 06/15/2022 11  1 - 32 U/L Final   • Alkaline Phosphatase 06/15/2022 87  39 - 117 U/L Final   • Total Bilirubin 06/15/2022 0.2  0.0 - 1.2 mg/dL Final   • Globulin 06/15/2022 3.8  gm/dL Final   • A/G Ratio 06/15/2022 0.9  g/dL Final   • BUN/Creatinine Ratio 06/15/2022 15.2  7.0 - 25.0 Final   • Anion Gap 06/15/2022 9.1  5.0 - 15.0 mmol/L Final   • eGFR 06/15/2022 99.9  >60.0 mL/min/1.73 Final    National Kidney Foundation and American Society of Nephrology (ASN) Task Force recommended calculation based on the Chronic Kidney Disease Epidemiology Collaboration (CKD-EPI) equation refit without adjustment for race.   • Troponin T 06/15/2022 <0.010  0.000 -  0.030 ng/mL Final   • Magnesium 06/15/2022 2.0  1.6 - 2.4 mg/dL Final   • Lactate 06/15/2022 0.7  0.5 - 2.0 mmol/L Final   • Procalcitonin 06/15/2022 0.06  0.00 - 0.25 ng/mL Final        CT Chest With Contrast Diagnostic    Result Date: 10/10/2022  Narrative: PROCEDURE: CT CHEST WITH CONTRAST DIAGNOSTIC-  HISTORY: f/u scans; C34.31-Malignant neoplasm of lower lobe, right bronchus or lung  COMPARISON: 06/15/2022.  TECHNIQUE: Axial CT with IV contrast administration.  FINDINGS:  Soft tissue density in the right hilar region is noted measuring 16 x 12 mm, previously 25 x 30 mm. This is compatible with tumor, improved from prior exam. Subcarinal adenopathy has resolved. Right suprahilar lymph node seen on prior exam has since resolved. A few tiny nodules are unchanged, nonspecific..  No pleural or pericardial effusion is seen. No adenopathy or mass lesion is present.      Impression: Improved intrathoracic disease with residual tumor right perihilar region.   This study was performed with techniques to keep radiation doses as low as reasonably achievable (ALARA). Individualized dose reduction techniques using automated exposure control or adjustment of vA and/or kV according to the patient size were employed.  This report was signed and finalized on 10/10/2022 9:57 AM by Ezequiel Shah MD.      ASSESSMENT: The patient is a very pleasant 62 y.o. female  with small cell lung cancer      PLAN:    1. Right lower lobe small cell lung cancer:  A.  I did go over the scan results with the patient and reassured her her scan from October 10, 2022 reveals improvement in her subcarinal disease with persistent abnormality in the right hilum currently measuring 16mm and decreased from 30 mm back in June 2022.  B.  At this time I will continue watchful waiting.  C.  If the patient has evidence of progression we will consider doing bronchoscopy with endobronchial ultrasound and biopsy.    2. Lambert-Eaton syndrome:  A. She will continue  Firdapse 10 mg 4 times daily as prescribed by neurology.    3. Hypothyroid:  A. She will continue Synthroid daily    4.  Poor systolic cardiac function:  A.  Status post defibrillator placement.    FOLLOW UP: 4 months with CT chest.     Susi Irving MD  10/12/2022

## 2022-10-12 ENCOUNTER — OFFICE VISIT (OUTPATIENT)
Dept: ONCOLOGY | Facility: CLINIC | Age: 62
End: 2022-10-12

## 2022-10-12 VITALS
OXYGEN SATURATION: 94 % | RESPIRATION RATE: 16 BRPM | DIASTOLIC BLOOD PRESSURE: 64 MMHG | TEMPERATURE: 97.4 F | HEART RATE: 92 BPM | WEIGHT: 171 LBS | HEIGHT: 66 IN | BODY MASS INDEX: 27.48 KG/M2 | SYSTOLIC BLOOD PRESSURE: 98 MMHG

## 2022-10-12 DIAGNOSIS — C34.31 SMALL CELL LUNG CANCER, RIGHT LOWER LOBE: Primary | ICD-10-CM

## 2022-10-12 PROBLEM — Z12.89 ENCOUNTER FOR SCREENING FOR MALIGNANT NEOPLASM OF OTHER SITES: Status: ACTIVE | Noted: 2020-12-15

## 2022-10-12 PROBLEM — H50.21 VERTICAL STRABISMUS OF RIGHT EYE: Status: ACTIVE | Noted: 2022-03-22

## 2022-10-12 PROBLEM — E87.1 HYPONATREMIA: Status: ACTIVE | Noted: 2020-08-28

## 2022-10-12 PROBLEM — H50.05 ALTERNATING ESOTROPIA: Status: ACTIVE | Noted: 2022-03-22

## 2022-10-12 PROBLEM — M47.817 LUMBOSACRAL SPONDYLOSIS WITHOUT MYELOPATHY: Status: ACTIVE | Noted: 2019-05-16

## 2022-10-12 PROBLEM — H52.4 HYPEROPIA OF BOTH EYES WITH ASTIGMATISM AND PRESBYOPIA: Status: ACTIVE | Noted: 2022-03-22

## 2022-10-12 PROBLEM — H53.2 BINOCULAR VISION DISORDER WITH DIPLOPIA: Status: ACTIVE | Noted: 2022-03-22

## 2022-10-12 PROBLEM — M54.50 LOW BACK PAIN: Status: ACTIVE | Noted: 2019-05-16

## 2022-10-12 PROBLEM — G70.80: Status: ACTIVE | Noted: 2020-10-22

## 2022-10-12 PROBLEM — H25.13 AGE-RELATED NUCLEAR CATARACT OF BOTH EYES: Status: ACTIVE | Noted: 2022-03-22

## 2022-10-12 PROBLEM — Z79.4 ENCOUNTER FOR LONG-TERM (CURRENT) USE OF INSULIN (HCC): Status: ACTIVE | Noted: 2019-05-16

## 2022-10-12 PROBLEM — G45.9 TRANSIENT ISCHEMIC ATTACK: Status: ACTIVE | Noted: 2020-08-28

## 2022-10-12 PROBLEM — M51.9 INTERVERTEBRAL DISC DISORDER: Status: ACTIVE | Noted: 2019-05-16

## 2022-10-12 PROBLEM — H52.03 HYPEROPIA OF BOTH EYES WITH ASTIGMATISM AND PRESBYOPIA: Status: ACTIVE | Noted: 2022-03-22

## 2022-10-12 PROBLEM — E06.3 HASHIMOTO'S THYROIDITIS: Status: ACTIVE | Noted: 2020-08-28

## 2022-10-12 PROBLEM — H52.203 HYPEROPIA OF BOTH EYES WITH ASTIGMATISM AND PRESBYOPIA: Status: ACTIVE | Noted: 2022-03-22

## 2022-10-12 PROBLEM — I50.9 CONGESTIVE HEART FAILURE: Status: ACTIVE | Noted: 2020-08-28

## 2022-10-12 PROBLEM — R91.1 LUNG NODULE: Status: ACTIVE | Noted: 2020-08-28

## 2022-10-12 PROBLEM — M46.1 INFLAMMATION OF SACROILIAC JOINT (HCC): Status: ACTIVE | Noted: 2019-05-16

## 2022-10-12 PROBLEM — R47.1 MILD TO MODERATE DYSARTHRIA: Status: ACTIVE | Noted: 2020-08-27

## 2022-10-12 PROCEDURE — 99214 OFFICE O/P EST MOD 30 MIN: CPT | Performed by: INTERNAL MEDICINE

## 2022-10-12 RX ORDER — POTASSIUM CHLORIDE 20 MEQ/1
TABLET, EXTENDED RELEASE ORAL
COMMUNITY
Start: 2022-08-16

## 2022-10-12 RX ORDER — BUDESONIDE AND FORMOTEROL FUMARATE DIHYDRATE 160; 4.5 UG/1; UG/1
AEROSOL RESPIRATORY (INHALATION)
COMMUNITY
Start: 2022-10-04 | End: 2022-10-12 | Stop reason: SDUPTHER

## 2022-10-12 RX ORDER — DIGOXIN 250 MCG
TABLET ORAL DAILY
COMMUNITY
Start: 2022-07-05

## 2022-10-12 RX ORDER — PANTOPRAZOLE SODIUM 40 MG/1
40 TABLET, DELAYED RELEASE ORAL DAILY
COMMUNITY
Start: 2022-09-23

## 2022-10-12 RX ORDER — CEFDINIR 300 MG/1
CAPSULE ORAL
COMMUNITY
Start: 2022-08-16

## 2022-10-12 RX ORDER — DEXAMETHASONE 4 MG/1
TABLET ORAL
COMMUNITY
Start: 2022-08-16

## 2023-01-27 ENCOUNTER — TELEPHONE (OUTPATIENT)
Dept: ONCOLOGY | Facility: CLINIC | Age: 63
End: 2023-01-27
Payer: MEDICARE

## 2023-01-27 NOTE — TELEPHONE ENCOUNTER
REturned Dejuan's call, no answer.  Left voicemail that both I and Mary Carmen reviewed the note from the physician and don't see anything about this mentioned. I advised I would suggest they call that physician to clarify and if there is concern, they need to contact our office to discuss with provider and if still concern we can see before April with Dr. Irving as well.

## 2023-01-27 NOTE — TELEPHONE ENCOUNTER
Caller: SARA LIVINGSTON    Relationship: Emergency Contact    Best call back number: [phone]    What is the best time to reach you: ANYTIME    Who are you requesting to speak with (clinical staff, provider,  specific staff member): DR DAVIS OR NURSE    What was the call regarding: SARA STATED THAT DR COLIN DID SOME RECENT LABS AND HAS AN OFFICE VISIT NOTE IN PTS CHART FROM . DR COLIN IS CONCERNED THAT PT MAY HAVE CANCER SOMEWHERE AND WANTED DR DAVIS TO REVIEW THE LABS AND NOTE AND ADVISE. PLEASE CALL SARA TO DISCUSS THIS FURTHER.     Do you require a callback: YES

## 2023-02-23 ENCOUNTER — HOSPITAL ENCOUNTER (OUTPATIENT)
Dept: CT IMAGING | Facility: HOSPITAL | Age: 63
Discharge: HOME | End: 2023-02-23
Admitting: INTERNAL MEDICINE
Payer: MEDICARE

## 2023-02-23 DIAGNOSIS — C34.31 SMALL CELL LUNG CANCER, RIGHT LOWER LOBE: ICD-10-CM

## 2023-02-23 PROCEDURE — 71260 CT THORAX DX C+: CPT

## 2023-02-23 PROCEDURE — 25010000002 IOPAMIDOL 61 % SOLUTION: Performed by: INTERNAL MEDICINE

## 2023-02-23 RX ADMIN — IOPAMIDOL 100 ML: 612 INJECTION, SOLUTION INTRAVENOUS at 08:54

## 2023-02-27 ENCOUNTER — OFFICE VISIT (OUTPATIENT)
Dept: ONCOLOGY | Facility: CLINIC | Age: 63
End: 2023-02-27
Payer: MEDICARE

## 2023-02-27 ENCOUNTER — LAB (OUTPATIENT)
Dept: LAB | Facility: HOSPITAL | Age: 63
End: 2023-02-27
Payer: MEDICARE

## 2023-02-27 VITALS
SYSTOLIC BLOOD PRESSURE: 102 MMHG | HEIGHT: 66 IN | DIASTOLIC BLOOD PRESSURE: 55 MMHG | OXYGEN SATURATION: 100 % | HEART RATE: 102 BPM | BODY MASS INDEX: 24.43 KG/M2 | WEIGHT: 152 LBS | TEMPERATURE: 97.5 F

## 2023-02-27 DIAGNOSIS — C34.31 SMALL CELL LUNG CANCER, RIGHT LOWER LOBE: Primary | ICD-10-CM

## 2023-02-27 DIAGNOSIS — H66.91 CHRONIC INFECTION OF RIGHT EAR: ICD-10-CM

## 2023-02-27 DIAGNOSIS — R42 DIZZINESS: ICD-10-CM

## 2023-02-27 DIAGNOSIS — C34.31 SMALL CELL LUNG CANCER, RIGHT LOWER LOBE: ICD-10-CM

## 2023-02-27 LAB
ALBUMIN SERPL-MCNC: 4.1 G/DL (ref 3.5–5.2)
ALBUMIN/GLOB SERPL: 1.2 G/DL
ALP SERPL-CCNC: 158 U/L (ref 39–117)
ALT SERPL W P-5'-P-CCNC: 25 U/L (ref 1–33)
ANION GAP SERPL CALCULATED.3IONS-SCNC: 9.4 MMOL/L (ref 5–15)
AST SERPL-CCNC: 20 U/L (ref 1–32)
BASOPHILS # BLD AUTO: 0.1 10*3/MM3 (ref 0–0.2)
BASOPHILS NFR BLD AUTO: 1.2 % (ref 0–1.5)
BILIRUB SERPL-MCNC: 0.2 MG/DL (ref 0–1.2)
BUN SERPL-MCNC: 14 MG/DL (ref 8–23)
BUN/CREAT SERPL: 18.7 (ref 7–25)
CALCIUM SPEC-SCNC: 9.6 MG/DL (ref 8.6–10.5)
CHLORIDE SERPL-SCNC: 101 MMOL/L (ref 98–107)
CO2 SERPL-SCNC: 30.6 MMOL/L (ref 22–29)
CREAT SERPL-MCNC: 0.75 MG/DL (ref 0.57–1)
DEPRECATED RDW RBC AUTO: 43.8 FL (ref 37–54)
EGFRCR SERPLBLD CKD-EPI 2021: 90.7 ML/MIN/1.73
EOSINOPHIL # BLD AUTO: 0.24 10*3/MM3 (ref 0–0.4)
EOSINOPHIL NFR BLD AUTO: 3 % (ref 0.3–6.2)
ERYTHROCYTE [DISTWIDTH] IN BLOOD BY AUTOMATED COUNT: 14.8 % (ref 12.3–15.4)
GLOBULIN UR ELPH-MCNC: 3.3 GM/DL
GLUCOSE SERPL-MCNC: 128 MG/DL (ref 65–99)
HCT VFR BLD AUTO: 40 % (ref 34–46.6)
HGB BLD-MCNC: 12.6 G/DL (ref 12–15.9)
IMM GRANULOCYTES # BLD AUTO: 0.03 10*3/MM3 (ref 0–0.05)
IMM GRANULOCYTES NFR BLD AUTO: 0.4 % (ref 0–0.5)
LYMPHOCYTES # BLD AUTO: 2.06 10*3/MM3 (ref 0.7–3.1)
LYMPHOCYTES NFR BLD AUTO: 25.3 % (ref 19.6–45.3)
MCH RBC QN AUTO: 26 PG (ref 26.6–33)
MCHC RBC AUTO-ENTMCNC: 31.5 G/DL (ref 31.5–35.7)
MCV RBC AUTO: 82.5 FL (ref 79–97)
MONOCYTES # BLD AUTO: 0.43 10*3/MM3 (ref 0.1–0.9)
MONOCYTES NFR BLD AUTO: 5.3 % (ref 5–12)
NEUTROPHILS NFR BLD AUTO: 5.27 10*3/MM3 (ref 1.7–7)
NEUTROPHILS NFR BLD AUTO: 64.8 % (ref 42.7–76)
NRBC BLD AUTO-RTO: 0 /100 WBC (ref 0–0.2)
PLATELET # BLD AUTO: 423 10*3/MM3 (ref 140–450)
PMV BLD AUTO: 9.4 FL (ref 6–12)
POTASSIUM SERPL-SCNC: 4.1 MMOL/L (ref 3.5–5.2)
PROT SERPL-MCNC: 7.4 G/DL (ref 6–8.5)
RBC # BLD AUTO: 4.85 10*6/MM3 (ref 3.77–5.28)
SODIUM SERPL-SCNC: 141 MMOL/L (ref 136–145)
WBC NRBC COR # BLD: 8.13 10*3/MM3 (ref 3.4–10.8)

## 2023-02-27 PROCEDURE — 99214 OFFICE O/P EST MOD 30 MIN: CPT | Performed by: NURSE PRACTITIONER

## 2023-02-27 PROCEDURE — 36415 COLL VENOUS BLD VENIPUNCTURE: CPT

## 2023-02-27 PROCEDURE — 85025 COMPLETE CBC W/AUTO DIFF WBC: CPT

## 2023-02-27 PROCEDURE — 80053 COMPREHEN METABOLIC PANEL: CPT

## 2023-02-27 RX ORDER — EMPAGLIFLOZIN 10 MG/1
TABLET, FILM COATED ORAL
COMMUNITY
Start: 2022-11-22

## 2023-02-27 RX ORDER — POTASSIUM CHLORIDE 750 MG/1
TABLET, FILM COATED, EXTENDED RELEASE ORAL
COMMUNITY
Start: 2023-02-03

## 2023-02-27 RX ORDER — BUDESONIDE AND FORMOTEROL FUMARATE DIHYDRATE 160; 4.5 UG/1; UG/1
AEROSOL RESPIRATORY (INHALATION)
COMMUNITY
Start: 2023-02-14

## 2023-02-27 NOTE — PROGRESS NOTES
DATE OF VISIT: 2/27/2023    REASON FOR VISIT: Followup for right lower lobe small cell lung cancer     PROBLEM LIST:  1. Right lower lobe small cell lung cancer T1 N1 M0 stage IIb:  A.  Presented with ataxia and lower extremities weakness  B.  Negative bronchoscopy August 19, 2020 and endobronchial ultrasound September 9, 2020  C.  CT chest done December 7, 2020 revealed 2.7 cm right hilar mass  D.  Whole-body PET scan done January 11, 2020 revealed isolated hypermetabolic activity at the right hilar mass SUV of 12  E. positive serology for AGN A-1 antibody which is 92% predicted for small cell lung cancer, positive N type calcium channel blocker and P/Q type calcium channel blooker.  F.  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  Hypertension  4.  Hypercholesteremia      HISTORY OF PRESENT ILLNESS: The patient is a very pleasant 62 y.o. female  with past medical history significant for suspected small cell lung cancer of the right lung. The patient never had a positive biopsy however her serology was suggestive of SCLCA after presenting with Lambert-Eaton syndrome. The patient is here today for scheduled follow up visit.     SUBJECTIVE: Gayatri is here today with her .  She is doing fairly well.  She continues to have her LifeVest due to being ill when she was scheduled for defibrillator.  She is planning to get this done in March.  She continues to abstain from smoking.  She is anxious about scan results.  She is concerned that her cancer is back.  She has had some dizziness and chronic ear infections.  No referral to ENT currently.     Past History:  Medical History: has a past medical history of Arthritis, Heart disease, Hyperlipidemia, Lambert-Eaton syndrome (HCC), and Lung cancer (HCC).   Surgical History: has a past surgical history that includes Hysterectomy; Foot surgery; and Knee surgery.   Family History: family  history includes Brain cancer in her father; Breast cancer in her sister; Heart disease in her brother, mother, and sister; Kidney disease in her mother.   Social History: reports that she has quit smoking. Her smoking use included cigarettes. She has a 15.00 pack-year smoking history. She has never used smokeless tobacco. She reports that she does not drink alcohol and does not use drugs.    (Not in a hospital admission)     Allergies: Erythromycin     Review of Systems   Constitutional: Positive for fatigue.   Respiratory: Negative.    Cardiovascular: Negative.         She is wearing a life vest   Gastrointestinal: Negative.          Current Outpatient Medications:   •  albuterol sulfate  (90 Base) MCG/ACT inhaler, Inhale 2 puffs Every 4 (Four) Hours As Needed., Disp: , Rfl:   •  Amifampridine Phosphate 10 MG tablet, Take 20 mg by mouth 4 (Four) Times a Day., Disp: , Rfl:   •  aspirin 81 MG EC tablet, Take 81 mg by mouth Daily., Disp: , Rfl:   •  atorvastatin (LIPITOR) 20 MG tablet, Take 20 mg by mouth Every Night., Disp: , Rfl:   •  budesonide (PULMICORT) 0.5 MG/2ML nebulizer solution, Take 0.5 mg by nebulization Daily., Disp: , Rfl:   •  cefdinir (OMNICEF) 300 MG capsule, , Disp: , Rfl:   •  dexamethasone (DECADRON) 4 MG tablet, , Disp: , Rfl:   •  digoxin (LANOXIN) 250 MCG tablet, Take  by mouth Daily., Disp: , Rfl:   •  furosemide (LASIX) 20 MG tablet, Take 20 mg by mouth Daily., Disp: , Rfl:   •  ipratropium-albuterol (DUO-NEB) 0.5-2.5 mg/3 ml nebulizer, , Disp: , Rfl:   •  Jardiance 10 MG tablet tablet, , Disp: , Rfl:   •  levothyroxine (SYNTHROID, LEVOTHROID) 150 MCG tablet, Take 150 mcg by mouth Daily., Disp: , Rfl:   •  metoprolol succinate XL (TOPROL-XL) 50 MG 24 hr tablet, Take 50 mg by mouth Daily., Disp: , Rfl:   •  pantoprazole (PROTONIX) 40 MG EC tablet, Take 1 tablet by mouth Daily., Disp: , Rfl:   •  potassium chloride (K-DUR,KLOR-CON) 20 MEQ CR tablet, , Disp: , Rfl:   •  potassium  "chloride 10 MEQ CR tablet, , Disp: , Rfl:   •  spironolactone (ALDACTONE) 25 MG tablet, Take 25 mg by mouth Daily., Disp: , Rfl:   •  Symbicort 160-4.5 MCG/ACT inhaler, , Disp: , Rfl:     PHYSICAL EXAMINATION:   /55   Pulse 102   Temp 97.5 °F (36.4 °C)   Ht 167.6 cm (66\")   Wt 68.9 kg (152 lb)   SpO2 100%   BMI 24.53 kg/m²    Pain Score    02/27/23 1113   PainSc: 0-No pain                     ECOG Performance Status: 1 - Symptomatic but completely ambulatory  General Appearance:  alert, cooperative, no apparent distress and appears stated age   Neurologic/Psychiatric: A&O x 3, gait steady, appropriate affect, strength 5/5 in all muscle groups   HEENT:  Normocephalic, without obvious abnormality, mucous membranes moist   Neck: Supple, symmetrical, trachea midline, no adenopathy;  No thyromegaly, masses, or tenderness   Lungs:   Clear to auscultation bilaterally; respirations regular, even, and unlabored bilaterally   Heart:  Regular rate and rhythm, no murmurs appreciated   Abdomen:   Soft, non-tender, non-distended and no organomegaly   Lymph nodes: No cervical, supraclavicular, inguinal or axillary adenopathy noted   Extremities: Normal, atraumatic; no clubbing, cyanosis, or edema    Skin: No rashes, ulcers, or suspicious lesions noted     No visits with results within 2 Week(s) from this visit.   Latest known visit with results is:   Admission on 06/15/2022, Discharged on 06/15/2022   Component Date Value Ref Range Status   • Extra Tube 06/15/2022 Hold for add-ons.   Final    Auto resulted.   • Extra Tube 06/15/2022 hold for add-on   Final    Auto resulted   • Extra Tube 06/15/2022 Hold for add-ons.   Final    Auto resulted.   • Extra Tube 06/15/2022 Hold for add-ons.   Final    Auto resulted   • WBC 06/15/2022 6.98  3.40 - 10.80 10*3/mm3 Final   • RBC 06/15/2022 4.28  3.77 - 5.28 10*6/mm3 Final   • Hemoglobin 06/15/2022 12.8  12.0 - 15.9 g/dL Final   • Hematocrit 06/15/2022 38.8  34.0 - 46.6 % Final "   • MCV 06/15/2022 90.7  79.0 - 97.0 fL Final   • MCH 06/15/2022 29.9  26.6 - 33.0 pg Final   • MCHC 06/15/2022 33.0  31.5 - 35.7 g/dL Final   • RDW 06/15/2022 14.5  12.3 - 15.4 % Final   • RDW-SD 06/15/2022 48.1  37.0 - 54.0 fl Final   • MPV 06/15/2022 9.4  6.0 - 12.0 fL Final   • Platelets 06/15/2022 294  140 - 450 10*3/mm3 Final   • Neutrophil % 06/15/2022 68.2  42.7 - 76.0 % Final   • Lymphocyte % 06/15/2022 22.5  19.6 - 45.3 % Final   • Monocyte % 06/15/2022 6.4  5.0 - 12.0 % Final   • Eosinophil % 06/15/2022 1.7  0.3 - 6.2 % Final   • Basophil % 06/15/2022 0.9  0.0 - 1.5 % Final   • Immature Grans % 06/15/2022 0.3  0.0 - 0.5 % Final   • Neutrophils, Absolute 06/15/2022 4.76  1.70 - 7.00 10*3/mm3 Final   • Lymphocytes, Absolute 06/15/2022 1.57  0.70 - 3.10 10*3/mm3 Final   • Monocytes, Absolute 06/15/2022 0.45  0.10 - 0.90 10*3/mm3 Final   • Eosinophils, Absolute 06/15/2022 0.12  0.00 - 0.40 10*3/mm3 Final   • Basophils, Absolute 06/15/2022 0.06  0.00 - 0.20 10*3/mm3 Final   • Immature Grans, Absolute 06/15/2022 0.02  0.00 - 0.05 10*3/mm3 Final   • nRBC 06/15/2022 0.0  0.0 - 0.2 /100 WBC Final   • Glucose 06/15/2022 107 (H)  65 - 99 mg/dL Final   • BUN 06/15/2022 10  8 - 23 mg/dL Final   • Creatinine 06/15/2022 0.66  0.57 - 1.00 mg/dL Final   • Sodium 06/15/2022 137  136 - 145 mmol/L Final   • Potassium 06/15/2022 3.8  3.5 - 5.2 mmol/L Final   • Chloride 06/15/2022 96 (L)  98 - 107 mmol/L Final   • CO2 06/15/2022 31.9 (H)  22.0 - 29.0 mmol/L Final   • Calcium 06/15/2022 9.8  8.6 - 10.5 mg/dL Final   • Total Protein 06/15/2022 7.3  6.0 - 8.5 g/dL Final   • Albumin 06/15/2022 3.50  3.50 - 5.20 g/dL Final   • ALT (SGPT) 06/15/2022 8  1 - 33 U/L Final   • AST (SGOT) 06/15/2022 11  1 - 32 U/L Final   • Alkaline Phosphatase 06/15/2022 87  39 - 117 U/L Final   • Total Bilirubin 06/15/2022 0.2  0.0 - 1.2 mg/dL Final   • Globulin 06/15/2022 3.8  gm/dL Final   • A/G Ratio 06/15/2022 0.9  g/dL Final   • BUN/Creatinine  Ratio 06/15/2022 15.2  7.0 - 25.0 Final   • Anion Gap 06/15/2022 9.1  5.0 - 15.0 mmol/L Final   • eGFR 06/15/2022 99.9  >60.0 mL/min/1.73 Final    National Kidney Foundation and American Society of Nephrology (ASN) Task Force recommended calculation based on the Chronic Kidney Disease Epidemiology Collaboration (CKD-EPI) equation refit without adjustment for race.   • Troponin T 06/15/2022 <0.010  0.000 - 0.030 ng/mL Final   • Magnesium 06/15/2022 2.0  1.6 - 2.4 mg/dL Final   • Lactate 06/15/2022 0.7  0.5 - 2.0 mmol/L Final   • Procalcitonin 06/15/2022 0.06  0.00 - 0.25 ng/mL Final        CT Chest With Contrast Diagnostic    Result Date: 2/23/2023  Narrative: PROCEDURE: CT CHEST W CONTRAST DIAGNOSTIC-  HISTORY: f/u scans; C34.31-Malignant neoplasm of lower lobe, right bronchus or lung  COMPARISON: 10/10/2022.  TECHNIQUE: Axial CT with IV contrast administration.  FINDINGS:  Crescentic nodule of the right middle lobe is seen measuring 5 mm unchanged. Irregular small area of soft tissue density in the peribronchial right lower lobe centrally is stable probably reflecting area of posttreatment change. 3 mm nodule in the superior segment left upper lobe is stable. There are adjacent 2 mm nodules as well within the left upper and left lower lobes. Moderate underlying emphysema is present..  No pleural or pericardial effusion is seen. No adenopathy or mass lesion is present.      Impression: 1. Small area of irregular soft tissue density in the right perihilar lower lobe along the bronchovascular bundle most compatible with treated tumor stable to minimally improved. 2. Scattered small nodules unchanged likely granulomas. 3. No adenopathy or effusions.   This study was performed with techniques to keep radiation doses as low as reasonably achievable (ALARA). Individualized dose reduction techniques using automated exposure control or adjustment of vA and/or kV according to the patient size were employed.  This report was  signed and finalized on 2/23/2023 9:32 AM by Ezequiel Shah MD.      ASSESSMENT: The patient is a very pleasant 62 y.o. female  with small cell lung cancer      PLAN:    1. Right lower lobe small cell lung cancer:  A.  I did go over the scan results with the patient and reassured her her scan from February 23, 2023 reveals improvement in the small areas irregular soft tissue density in the perihilar lower lobe along the bronchovascular bundle.   B.  At this time we will continue watchful waiting.  C.  If the patient has evidence of progression we will consider doing bronchoscopy with endobronchial ultrasound and biopsy.  D.  I discussed with the patient labs from  on 1/4/2023 showed a stable CMP.  No recent CBC.  We will recheck today.     2. Lambert-Eaton syndrome:  A. She will continue Firdapse 10 mg 4 times daily as prescribed by neurology.    3. Hypothyroid:  A. She will continue Synthroid daily    4.  Poor systolic cardiac function:  A.  Status post defibrillator placement.    5.  Dizziness and chronic ear infection  A.  MR from  showed negative for metastatic disease.  B.  I will refer to ENT for further evaluation.    FOLLOW UP: 4 months with CT chest.     Mary Carmen Hoover, APRN  2/27/2023

## 2023-06-23 ENCOUNTER — TELEPHONE (OUTPATIENT)
Dept: ONCOLOGY | Facility: CLINIC | Age: 63
End: 2023-06-23

## 2023-06-23 NOTE — TELEPHONE ENCOUNTER
Caller: SARA LIVINGSTON    Relationship: Emergency Contact    Best call back number: [phone]    What is the best time to reach you: ANYTIME    Who are you requesting to speak with (clinical staff, provider,  specific staff member): CLINICAL     Do you know the name of the person who called: SARA    What was the call regarding: SARA CALLING PATIENT WAS SEEN AT [facility], SEE RECORDS IN CARE EVERYWHERE.  PATIENT IS NEEDING A BIOPSY OF THE ABDOMEN AND WAS WANTING TO KNOW IF DR. DAVIS CAN DO A REFERRAL FOR HER TO HAVE ONE DONE THROUGH [facility]?    CALL TO DISCUSS

## 2023-06-23 NOTE — TELEPHONE ENCOUNTER
Left message for patients daughter that we reviewed the report from [facility] and Dr. Irving advised we will order the biopsy for Teddy in IR.  I advised for her to keep appointment next week and bring the disk from her scans next week in case we need them.  I did call and ask [facility] to power share her pet/ct and head scan.

## 2023-07-21 ENCOUNTER — HOSPITAL ENCOUNTER (OUTPATIENT)
Dept: RADIATION ONCOLOGY | Facility: HOSPITAL | Age: 63
Setting detail: RADIATION/ONCOLOGY SERIES
Discharge: HOME | End: 2023-07-21
Payer: MEDICARE

## 2023-07-21 PROBLEM — C77.2 SECONDARY MALIGNANT NEOPLASM OF INTRA-ABDOMINAL LYMPH NODES: Status: ACTIVE | Noted: 2023-07-21

## 2023-07-21 PROCEDURE — 77290 THER RAD SIMULAJ FIELD CPLX: CPT | Performed by: RADIOLOGY

## 2023-07-21 PROCEDURE — 77470 SPECIAL RADIATION TREATMENT: CPT | Performed by: RADIOLOGY

## 2023-07-21 PROCEDURE — 77332 RADIATION TREATMENT AID(S): CPT | Performed by: RADIOLOGY

## 2023-07-25 ENCOUNTER — TELEPHONE (OUTPATIENT)
Dept: ONCOLOGY | Facility: CLINIC | Age: 63
End: 2023-07-25
Payer: MEDICARE

## 2023-07-25 NOTE — TELEPHONE ENCOUNTER
Caller: SARA LIVINGSTON    Relationship to patient: Emergency Contact    Best call back number: [phone]    Patient is needing: TO GET A LETTER STATING SHE DOES HAVE A CANCER DIAGNOSIS FOR THE [facility] CANCER FUND.

## 2023-07-27 PROCEDURE — 77301 RADIOTHERAPY DOSE PLAN IMRT: CPT | Performed by: RADIOLOGY

## 2023-07-27 PROCEDURE — 77338 DESIGN MLC DEVICE FOR IMRT: CPT | Performed by: RADIOLOGY

## 2023-07-27 PROCEDURE — 77300 RADIATION THERAPY DOSE PLAN: CPT | Performed by: RADIOLOGY

## 2023-07-31 ENCOUNTER — HOSPITAL ENCOUNTER (OUTPATIENT)
Dept: RADIATION ONCOLOGY | Facility: HOSPITAL | Age: 63
Discharge: HOME | End: 2023-07-31
Payer: MEDICARE

## 2023-07-31 PROCEDURE — 77386: CPT | Performed by: RADIOLOGY

## 2023-08-01 ENCOUNTER — HOSPITAL ENCOUNTER (OUTPATIENT)
Dept: RADIATION ONCOLOGY | Facility: HOSPITAL | Age: 63
Setting detail: RADIATION/ONCOLOGY SERIES
Discharge: HOME | End: 2023-08-01
Payer: MEDICARE

## 2023-08-01 ENCOUNTER — DOCUMENTATION (OUTPATIENT)
Dept: NUTRITION | Facility: HOSPITAL | Age: 63
End: 2023-08-01
Payer: MEDICARE

## 2023-08-01 VITALS — BODY MASS INDEX: 25.47 KG/M2 | WEIGHT: 157.8 LBS

## 2023-08-01 PROCEDURE — 77386: CPT | Performed by: RADIOLOGY

## 2023-08-01 RX ORDER — ONDANSETRON 4 MG/1
4 TABLET, FILM COATED ORAL EVERY 8 HOURS PRN
Status: CANCELLED | OUTPATIENT
Start: 2023-08-01

## 2023-08-02 ENCOUNTER — HOSPITAL ENCOUNTER (OUTPATIENT)
Dept: RADIATION ONCOLOGY | Facility: HOSPITAL | Age: 63
Discharge: HOME | End: 2023-08-02
Payer: MEDICARE

## 2023-08-02 PROCEDURE — 77386: CPT | Performed by: RADIOLOGY

## 2023-08-03 ENCOUNTER — HOSPITAL ENCOUNTER (OUTPATIENT)
Dept: RADIATION ONCOLOGY | Facility: HOSPITAL | Age: 63
Discharge: HOME | End: 2023-08-03
Payer: MEDICARE

## 2023-08-03 ENCOUNTER — HOSPITAL ENCOUNTER (OUTPATIENT)
Dept: RADIATION ONCOLOGY | Facility: HOSPITAL | Age: 63
Setting detail: RADIATION/ONCOLOGY SERIES
Discharge: HOME | End: 2023-08-03
Payer: MEDICARE

## 2023-08-03 PROCEDURE — 77336 RADIATION PHYSICS CONSULT: CPT | Performed by: RADIOLOGY

## 2023-08-03 PROCEDURE — 77386: CPT | Performed by: RADIOLOGY

## 2023-08-04 ENCOUNTER — HOSPITAL ENCOUNTER (OUTPATIENT)
Dept: RADIATION ONCOLOGY | Facility: HOSPITAL | Age: 63
Discharge: HOME | End: 2023-08-04
Payer: MEDICARE

## 2023-08-04 PROCEDURE — 77386: CPT | Performed by: RADIOLOGY

## 2023-08-07 ENCOUNTER — HOSPITAL ENCOUNTER (OUTPATIENT)
Dept: RADIATION ONCOLOGY | Facility: HOSPITAL | Age: 63
Discharge: HOME | End: 2023-08-07
Payer: MEDICARE

## 2023-08-07 PROCEDURE — 77386: CPT | Performed by: RADIOLOGY

## 2023-08-08 ENCOUNTER — HOSPITAL ENCOUNTER (OUTPATIENT)
Dept: RADIATION ONCOLOGY | Facility: HOSPITAL | Age: 63
Discharge: HOME | End: 2023-08-08
Payer: MEDICARE

## 2023-08-08 VITALS — BODY MASS INDEX: 25.42 KG/M2 | WEIGHT: 157.5 LBS

## 2023-08-08 PROCEDURE — 77386: CPT | Performed by: RADIOLOGY

## 2023-08-09 ENCOUNTER — HOSPITAL ENCOUNTER (OUTPATIENT)
Dept: RADIATION ONCOLOGY | Facility: HOSPITAL | Age: 63
Discharge: HOME | End: 2023-08-09
Payer: MEDICARE

## 2023-08-09 PROCEDURE — 77386: CPT | Performed by: RADIOLOGY

## 2023-08-10 ENCOUNTER — HOSPITAL ENCOUNTER (OUTPATIENT)
Dept: RADIATION ONCOLOGY | Facility: HOSPITAL | Age: 63
Discharge: HOME | End: 2023-08-10
Payer: MEDICARE

## 2023-08-10 PROCEDURE — 77386: CPT | Performed by: RADIOLOGY

## 2023-08-10 PROCEDURE — 77336 RADIATION PHYSICS CONSULT: CPT | Performed by: RADIOLOGY

## 2023-08-11 ENCOUNTER — HOSPITAL ENCOUNTER (OUTPATIENT)
Dept: RADIATION ONCOLOGY | Facility: HOSPITAL | Age: 63
Discharge: HOME | End: 2023-08-11
Payer: MEDICARE

## 2023-08-11 PROCEDURE — 77386: CPT | Performed by: RADIOLOGY

## 2023-08-27 NOTE — RADIATION COMPLETION NOTES
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 female previously treated with chemotherapy and radiation for limited stage small cell carcinoma of the lung in .  Repeat scan in 2023 revealed complete resolution of activity in the chest but new portacaval hypermetabolic mass  that is biopsy-proven small cell carcinoma.  She was referred for palliative radiotherapy.  Dr. Irving will consider systemic chemotherapy if her performance status improves.     She received radiotherapy in our department as follows:    TREATMENT COURSE:   -8 2023 the abdominal mass received 30 Gray in 10 fractions with 10 MV photons      TOLERANCE:   Ms. Nichols tolerated her treatment well.  She had mild nausea for which we prescribe Zofran and a stool softener.  She complained only of an occasional pulling sensation in the right ribs.      DISPOSITION:  At the completion of therapy an appointment was made for her to return on 9/15/2023 at 11:30 AM.  She knows to call if she has any problems sooner.        Paula Guzmán MD    Dictated using dragon dictation

## 2023-09-15 ENCOUNTER — HOSPITAL ENCOUNTER (OUTPATIENT)
Dept: RADIATION ONCOLOGY | Facility: HOSPITAL | Age: 63
Setting detail: RADIATION/ONCOLOGY SERIES
Discharge: HOME | End: 2023-09-15
Payer: MEDICARE

## 2023-09-22 ENCOUNTER — OFFICE VISIT (OUTPATIENT)
Dept: RADIATION ONCOLOGY | Facility: HOSPITAL | Age: 63
End: 2023-09-22
Payer: MEDICARE

## 2023-09-22 DIAGNOSIS — C34.31 SMALL CELL LUNG CANCER, RIGHT LOWER LOBE: Primary | ICD-10-CM

## 2023-09-22 NOTE — PROGRESS NOTES
TELEMEDICINE FOLLOW UP NOTE    PATIENT:                                                      Gayatri Kahn  MEDICAL RECORD #:                        [id number]  :                                                          1960  COMPLETION DATE:   2023  DIAGNOSIS:     Extensive stage small cell lung cancer  - Initial: Stage IIB (cT1c, cN1, cM0)  - Current: Stage IV      This visit has been converted to a telehealth virtual visit, the patient's preferred method for today's follow-up.  Total time of discussion was <10 minutes.  The patient has given verbal consent.      BRIEF HISTORY:  Gayatri Kahn is a 63 y.o. female who presents via telemedicine for initial follow-up visit.  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  The patient was considered too weak for chemotherapy, but did undergo a course of palliative radiotherapy to the right hilum and adjacent mediastinum to a cumulative dose of 45 Gray in 15 fractions, completing 3/15/2021.  Repeat CT scans at that time showed a good response to treatment within the right hilum and no obvious disease elsewhere.  The patient's symptoms from Lambert-Eaton syndrome did not resolve, and she was not considered a good candidate for PCI or systemic therapies.  She has been followed closely with surveillance imaging in the medical oncology clinic since that time.  More recently, repeat PET/CT scan on 2023 revealed complete resolution of activity in the chest, however a new portacaval hypermetabolic mass appeared concerning progression.  There were no additional areas of hypermetabolic activity otherwise identified.  She underwent needle biopsy of the abdominal mass on 2023 with pathology confirming metastatic small cell carcinoma.    She  underwent palliative radiotherapy to a dose of 30 Gray in 10 fractions delivered to the biopsy-proven abdominal metastasis, completing 8/11/2023.  The patient tolerated treatment well.  She was premedicated with Zofran and denies any nausea or vomiting related to treatment.  The patient denies abdominal pain, distention, early satiety, or weight loss.  Appetite is good.  She reports brief hospitalization last week at [facility] for right-sided pneumonia, treated with IV fluids and antibiotics.  At this point, she reports fatigue and dyspnea are improved.  She remains on supplemental O2 at 4 L at night only.  She denies fever or chills.  She denies additional acute concerns today.        MEDICATIONS: Medication reconciliation for the patient was reviewed and confirmed in the electronic medical record.    Review of Systems   Constitutional:  Positive for fatigue.   Eyes:  Positive for eye problems (Diplopia).   Respiratory:  Positive for cough and shortness of breath.         Supplemental O2 at 4L via NC at night/as needed   Musculoskeletal:  Positive for gait problem (Wheelchair).   Neurological:  Positive for dizziness, gait problem (Wheelchair) and light-headedness.   All other systems reviewed and are negative.           KPS 70%      Physical Exam  Pulmonary:      Respirations even, unlabored. No audible wheezing or cough.  Neurological:      A+Ox4, conversant, answers questions appropriately.  Psychiatric:     Judgement, affect, and decision-making WNL.    Limited physical exam as visit was conducted remotely via telephone.            The following portions of the patient's history were reviewed and updated as appropriate: allergies, current medications, past family history, past medical history, past social history, past surgical history and problem list.         Diagnoses and all orders for this visit:    1. Small cell lung cancer, right lower lobe (Primary)         IMPRESSION:  Gayatri Kahn is a 63  y.o. 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 Irving since undergoing a palliative course of radiotherapy to the gross disease in the right hilum more than 2 years ago.  She now presents with a biopsy-proven abdominal/portacaval lymph node metastasis that appears to be her only site of active disease on metabolic imaging.  She is now 1 month status post palliative radiation therapy to this area.  She tolerated treatment well.  She did not develop significant acute radiation-related toxicities.  She is fairly debilitated at baseline, but review of most recent medical oncology note cites consideration of systemic chemotherapy if she improves her performance status.  The patient is also continuing Firdapse for her Lambert-Eaton syndrome.  The patient is scheduled for repeat CT scans of the chest, abdomen pelvis on 10/17/2023 to evaluate response to treatment and for ongoing surveillance.  I will schedule the patient to return to our clinic at that time for follow-up and imaging review.  If no further role for palliative radiotherapy is indicated at that time, I anticipate the patient will continue oncologic management and close surveillance under the direction of Dr. Irving.  The patient and I discussed follow-up intervals, repeat imaging, and continued expectations for response to treatment.    RECOMMENDATIONS:    Return in about 1 month (around 10/22/2023) for Office Visit.    DERICK Crystal      I spent a total of 35 minutes on today's visit, with more than <10 minutes in virtual communication with the patient via telephone, and the remainder of the time spent in reviewing the relevant history, records, available imaging, and for documentation.

## 2023-10-17 ENCOUNTER — HOSPITAL ENCOUNTER (OUTPATIENT)
Dept: CT IMAGING | Facility: HOSPITAL | Age: 63
Discharge: HOME | End: 2023-10-17
Payer: MEDICARE

## 2023-10-17 DIAGNOSIS — C34.31 SMALL CELL LUNG CANCER, RIGHT LOWER LOBE: ICD-10-CM

## 2023-10-17 PROCEDURE — 25510000001 IOPAMIDOL 61 % SOLUTION: Performed by: INTERNAL MEDICINE

## 2023-10-17 PROCEDURE — 74177 CT ABD & PELVIS W/CONTRAST: CPT

## 2023-10-17 PROCEDURE — 71260 CT THORAX DX C+: CPT

## 2023-10-17 RX ADMIN — IOPAMIDOL 100 ML: 612 INJECTION, SOLUTION INTRAVENOUS at 08:44

## 2023-10-18 ENCOUNTER — HOSPITAL ENCOUNTER (OUTPATIENT)
Dept: RADIATION ONCOLOGY | Facility: HOSPITAL | Age: 63
Setting detail: RADIATION/ONCOLOGY SERIES
Discharge: HOME | End: 2023-10-18
Payer: MEDICARE

## 2023-10-18 ENCOUNTER — OFFICE VISIT (OUTPATIENT)
Dept: RADIATION ONCOLOGY | Facility: HOSPITAL | Age: 63
End: 2023-10-18
Payer: MEDICARE

## 2023-10-18 ENCOUNTER — OFFICE VISIT (OUTPATIENT)
Dept: ONCOLOGY | Facility: CLINIC | Age: 63
End: 2023-10-18
Payer: MEDICARE

## 2023-10-18 VITALS
OXYGEN SATURATION: 96 % | RESPIRATION RATE: 16 BRPM | WEIGHT: 153.1 LBS | SYSTOLIC BLOOD PRESSURE: 98 MMHG | HEART RATE: 96 BPM | DIASTOLIC BLOOD PRESSURE: 57 MMHG | BODY MASS INDEX: 24.71 KG/M2 | TEMPERATURE: 97.6 F

## 2023-10-18 VITALS
HEART RATE: 100 BPM | RESPIRATION RATE: 16 BRPM | HEIGHT: 66 IN | TEMPERATURE: 97.5 F | WEIGHT: 152 LBS | OXYGEN SATURATION: 95 % | DIASTOLIC BLOOD PRESSURE: 53 MMHG | BODY MASS INDEX: 24.43 KG/M2 | SYSTOLIC BLOOD PRESSURE: 114 MMHG

## 2023-10-18 DIAGNOSIS — C77.2 SECONDARY MALIGNANT NEOPLASM OF INTRA-ABDOMINAL LYMPH NODES: ICD-10-CM

## 2023-10-18 DIAGNOSIS — C34.31 SMALL CELL LUNG CANCER, RIGHT LOWER LOBE: Primary | ICD-10-CM

## 2023-10-18 PROCEDURE — G0463 HOSPITAL OUTPT CLINIC VISIT: HCPCS

## 2023-10-18 PROCEDURE — 99214 OFFICE O/P EST MOD 30 MIN: CPT | Performed by: INTERNAL MEDICINE

## 2023-10-18 PROCEDURE — 1126F AMNT PAIN NOTED NONE PRSNT: CPT | Performed by: INTERNAL MEDICINE

## 2023-10-18 NOTE — PROGRESS NOTES
DATE OF VISIT: 10/18/2023    REASON FOR VISIT: Followup for right lower lobe small cell lung cancer     PROBLEM LIST:  1. Right lower lobe small cell lung cancer T1 N1 M0 stage IIb:  A.  Presented with ataxia and lower extremities weakness  B.  Negative bronchoscopy August 19, 2020 and endobronchial ultrasound September 9, 2020  C.  CT chest done December 7, 2020 revealed 2.7 cm right hilar mass  D.  Whole-body PET scan done January 11, 2020 revealed isolated hypermetabolic activity at the right hilar mass SUV of 12  E. positive serology for AGN A-1 antibody which is 92% predicted for small cell lung cancer, positive N type calcium channel blocker and P/Q type calcium channel blooker.  F.  Started chest radiation February 26, 2021, completed end of March 2021  G.  Repeated PET scan done June 08, 2023 revealed complete resolution of activity in the chest however new portacaval hypermetabolic mass.  Biopsy-proven small cell cancer July 12, 2023  H.  Treated with definitive radiotherapy, completed July 2023.  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  Hypertension  4.  Hypercholesteremia      HISTORY OF PRESENT ILLNESS: The patient is a very pleasant 63 y.o. female  with past medical history significant for suspected small cell lung cancer of the right lung. The patient never had a positive biopsy however her serology was suggestive of SCLCA after presenting with Lambert-Eaton syndrome. The patient is here today for scheduled follow up visit.     SUBJECTIVE: Gayatri is here today with her .  She is doing fairly well.  No fever chills no night sweats.  She is still fairly fatigued.  She walk with assistance.  She is anxious about the scan results.    Past History:  Medical History: has a past medical history of Arthritis, Disease of thyroid gland, Heart disease, History of radiation therapy (08/11/2023), Hyperlipidemia, Lambert-Eaton syndrome, Lung cancer (02/2021), Metastatic cancer, and  Requires supplemental oxygen.   Surgical History: has a past surgical history that includes Hysterectomy; Foot surgery; and Knee surgery.   Family History: family history includes Brain cancer in her father; Breast cancer in her sister; Heart disease in her brother, mother, and sister; Kidney disease in her mother.   Social History: reports that she quit smoking about 14 months ago. Her smoking use included cigarettes. She has a 15.00 pack-year smoking history. She has never used smokeless tobacco. She reports that she does not drink alcohol and does not use drugs.    (Not in a hospital admission)     Allergies: Erythromycin     Review of Systems   Constitutional:  Positive for fatigue.   Respiratory: Negative.     Cardiovascular: Negative.    Gastrointestinal: Negative.          Current Outpatient Medications:     albuterol sulfate  (90 Base) MCG/ACT inhaler, Inhale 2 puffs Every 4 (Four) Hours As Needed., Disp: , Rfl:     Amifampridine Phosphate (Firdapse) 10 MG tablet, Take 2 tablets by mouth 4 (Four) Times a Day., Disp: , Rfl:     aspirin 81 MG EC tablet, Take 1 tablet by mouth Daily. 7-6-23 last dose, Disp: , Rfl:     atorvastatin (LIPITOR) 20 MG tablet, Take 1 tablet by mouth Every Night., Disp: , Rfl:     furosemide (LASIX) 40 MG tablet, Take 1 tablet by mouth 2 (Two) Times a Day., Disp: , Rfl:     ipratropium-albuterol (DUO-NEB) 0.5-2.5 mg/3 ml nebulizer, , Disp: , Rfl:     Jardiance 10 MG tablet tablet, , Disp: , Rfl:     levothyroxine (SYNTHROID, LEVOTHROID) 137 MCG tablet, Take 1 tablet by mouth Daily., Disp: , Rfl:     lisinopril (PRINIVIL,ZESTRIL) 5 MG tablet, , Disp: , Rfl:     metoprolol tartrate (LOPRESSOR) 50 MG tablet, Take 1 tablet by mouth 2 (Two) Times a Day., Disp: , Rfl:     potassium chloride 10 MEQ CR tablet, Take 1 tablet by mouth 2 (Two) Times a Day., Disp: , Rfl:     sertraline (ZOLOFT) 25 MG tablet, Take 1 tablet by mouth Daily., Disp: , Rfl:     spironolactone (ALDACTONE) 25 MG  tablet, Take 1 tablet by mouth Daily., Disp: , Rfl:     PHYSICAL EXAMINATION:   There were no vitals taken for this visit.   There were no vitals filed for this visit.                    ECOG Performance Status: 2 - Symptomatic, <50% confined to bed  General Appearance:  alert, cooperative, no apparent distress and appears stated age   Neurologic/Psychiatric: A&O x 3, gait steady, appropriate affect, strength 5/5 in all muscle groups   HEENT:  Normocephalic, without obvious abnormality, mucous membranes moist   Neck: Supple, symmetrical, trachea midline, no adenopathy;  No thyromegaly, masses, or tenderness   Lungs:   Clear to auscultation bilaterally; respirations regular, even, and unlabored bilaterally   Heart:  Regular rate and rhythm, no murmurs appreciated   Abdomen:   Soft, non-tender, non-distended, and no organomegaly   Lymph nodes: No cervical, supraclavicular, inguinal or axillary adenopathy noted   Extremities: Normal, atraumatic; no clubbing, cyanosis, or edema    Skin: No rashes, ulcers, or suspicious lesions noted     No visits with results within 2 Week(s) from this visit.   Latest known visit with results is:   Hospital Outpatient Visit on 07/12/2023   Component Date Value Ref Range Status    Glucose 07/12/2023 91  65 - 99 mg/dL Final    BUN 07/12/2023 12  8 - 23 mg/dL Final    Creatinine 07/12/2023 0.63  0.57 - 1.00 mg/dL Final    Sodium 07/12/2023 144  136 - 145 mmol/L Final    Potassium 07/12/2023 4.2  3.5 - 5.2 mmol/L Final    Chloride 07/12/2023 102  98 - 107 mmol/L Final    CO2 07/12/2023 33.0 (H)  22.0 - 29.0 mmol/L Final    Calcium 07/12/2023 10.0  8.6 - 10.5 mg/dL Final    BUN/Creatinine Ratio 07/12/2023 19.0  7.0 - 25.0 Final    Anion Gap 07/12/2023 9.0  5.0 - 15.0 mmol/L Final    eGFR 07/12/2023 100.4  >60.0 mL/min/1.73 Final    Protime 07/12/2023 13.0  12.2 - 14.5 Seconds Final    INR 07/12/2023 0.97  0.89 - 1.12 Final    WBC 07/12/2023 6.55  3.40 - 10.80 10*3/mm3 Final    RBC 07/12/2023  4.53  3.77 - 5.28 10*6/mm3 Final    Hemoglobin 07/12/2023 12.3  12.0 - 15.9 g/dL Final    Hematocrit 07/12/2023 40.1  34.0 - 46.6 % Final    MCV 07/12/2023 88.5  79.0 - 97.0 fL Final    MCH 07/12/2023 27.2  26.6 - 33.0 pg Final    MCHC 07/12/2023 30.7 (L)  31.5 - 35.7 g/dL Final    RDW 07/12/2023 16.2 (H)  12.3 - 15.4 % Final    RDW-SD 07/12/2023 52.9  37.0 - 54.0 fl Final    MPV 07/12/2023 9.4  6.0 - 12.0 fL Final    Platelets 07/12/2023 294  140 - 450 10*3/mm3 Final    Neutrophil % 07/12/2023 60.9  42.7 - 76.0 % Final    Lymphocyte % 07/12/2023 27.5  19.6 - 45.3 % Final    Monocyte % 07/12/2023 6.4  5.0 - 12.0 % Final    Eosinophil % 07/12/2023 3.8  0.3 - 6.2 % Final    Basophil % 07/12/2023 1.2  0.0 - 1.5 % Final    Immature Grans % 07/12/2023 0.2  0.0 - 0.5 % Final    Neutrophils, Absolute 07/12/2023 3.99  1.70 - 7.00 10*3/mm3 Final    Lymphocytes, Absolute 07/12/2023 1.80  0.70 - 3.10 10*3/mm3 Final    Monocytes, Absolute 07/12/2023 0.42  0.10 - 0.90 10*3/mm3 Final    Eosinophils, Absolute 07/12/2023 0.25  0.00 - 0.40 10*3/mm3 Final    Basophils, Absolute 07/12/2023 0.08  0.00 - 0.20 10*3/mm3 Final    Immature Grans, Absolute 07/12/2023 0.01  0.00 - 0.05 10*3/mm3 Final    nRBC 07/12/2023 0.0  0.0 - 0.2 /100 WBC Final    Case Report 07/12/2023    Final                    Value:Surgical Pathology Report                         Case: [id number]                                  Authorizing Provider:  Susi Irving MD         Collected:           07/12/2023 11:45 AM          Ordering Location:     [facility]   Received:            07/12/2023 12:52 PM                                 CT                                                                           Pathologist:           Ezequiel Navarrete MD                                                             Specimen:    Abdomen                                                                                    Clinical Information 07/12/2023    Final                     Value:This result contains rich text formatting which cannot be displayed here.    Final Diagnosis 07/12/2023    Final                    Value:This result contains rich text formatting which cannot be displayed here.    Comment 07/12/2023    Final                    Value:This result contains rich text formatting which cannot be displayed here.    Gross Description 07/12/2023    Final                    Value:This result contains rich text formatting which cannot be displayed here.    Microscopic Description 07/12/2023    Final                    Value:This result contains rich text formatting which cannot be displayed here.        CT Abdomen Pelvis With Contrast    Result Date: 10/17/2023  Narrative: PROCEDURE: CT ABDOMEN PELVIS W CONTRAST-  HISTORY: f/u scans; C34.31-Malignant neoplasm of lower lobe, right bronchus or lung  COMPARISON: PET/CT July 8, 2022..  PROCEDURE: The patient was injected with IV contrast. Oral contrast was administered. Axial images were obtained from the lung bases to the pubic symphysis by computed tomography. This study was performed with techniques to keep radiation doses as low as reasonably achievable, (ALARA). Individualized dose reduction techniques using automated exposure control or adjustment of mA and/or kV according to the patient size were employed.  FINDINGS:  ABDOMEN: The lung bases are clear. The heart is proper size. The liver demonstrates diffuse fatty infiltration. Gallbladder present with no CT visible stones. The spleen is unremarkable. No right adrenal mass is present. Hypodense left adrenal mass is stable. Vascular calcifications noted. The pancreas is unremarkable. The kidneys are unremarkable, without evidence of mass or hydronephrosis. The aorta is proper caliber. There is no free fluid or adenopathy. Mild enhancement of the gastric wall mucosa noted, consider gastritis.  PELVIS: The GI tract demonstrates no obstruction.  The appendix is not seen  but no secondary signs of appendicitis identified. Uterus is surgically absent. The urinary bladder is unremarkable. There is no free fluid, adenopathy, or inflammatory process. No bony destructive lesion seen.      Impression: Possible gastritis.  Stable left adrenal lesion, most consistent with adenoma.  CTDI: 5.29 mGy DLP:358.63 mGy.cm  This report was signed and finalized on 10/17/2023 9:10 AM by Parvin Cardenas MD.      CT Chest With Contrast Diagnostic    Result Date: 10/17/2023  Narrative: PROCEDURE: CT CHEST W CONTRAST DIAGNOSTIC-  HISTORY: f/u scans; C34.31-Malignant neoplasm of lower lobe, right bronchus or lung  COMPARISON: June 26, 2023  PROCEDURE: The patient was injected with IV contrast.  Axial images were obtained from the lung apex to the mid abdomen by computed tomography. This study was performed with techniques to keep radiation doses as low as reasonably achievable, (ALARA). Individualized dose reduction techniques using automated exposure control or adjustment of mA and/or kV according to the patient size were employed.  FINDINGS:  CHEST: There is no suspicious axillary adenopathy. There is no suspicious hilar or mediastinal adenopathy. Emphysematous change again noted. Linear scarring or atelectasis is improved in the right lower lobe. Minor fissural nodule is stable. Major fissural nodule is stable. No new nodules identified. The heart is proper size. There is no pericardial or pleural effusion.  No suspicious infiltrate or nodule identified.  Limited images of the upper abdomen demonstrate diffuse fatty infiltration of the liver. No bony destructive lesion identified.      Impression: Stable posttreatment change.    CTDI: 5.29 mGy DLP:358.63 mGy.cm  This report was signed and finalized on 10/17/2023 9:04 AM by Parvin Cardenas MD.      CT Abdomen Pelvis With Contrast    Result Date: 10/12/2023  Narrative: CT ABDOMEN AND PELVIS, WITH IV CONTRAST. HISTORY: Lower abdominal pain. COMPARISON: None.  PROCEDURE:  IV contrast-enhanced exam. FINDINGS: ABDOMEN: The lung bases are clear. Mild enlargement of the left adrenal gland is seen probably due to hyperplasia. Remaining solid organs are normal. Gallbladder is unremarkable. No bowel obstruction is present. Fecal impaction of colon is noted. PELVIS: The appendix is normal. Patient is status post hysterectomy. Bladder is decompressed. There is no significant free fluid or adenopathy.     Impression: 1. Moderate diffuse fecal impaction. 2. No acute intra-abdominal process. This study was performed using dose reduction techniques to achieve radiation exposure as low as reasonably achievable (ALARA).    CT outside films    Result Date: 10/5/2023  Narrative: This procedure was auto-finalized with no dictation required.       ASSESSMENT: The patient is a very pleasant 63 y.o. female  with small cell lung cancer      PLAN:    1. Right lower lobe small cell lung cancer:  A.  I did go over the scan results with the patient from October 5, 2023 and reassured no evidence of any new or progressive disease.  B.  The patient will follow-up with us in 3 months repeat scans were  C.  If she has evidence of progressive disease we will discuss role of chemotherapy.    2. Lambert-Eaton syndrome:  A. She will continue Firdapse 10 mg 4 times daily as prescribed by neurology.    3. Hypothyroid:  A. She will continue Synthroid daily    4.  Poor systolic cardiac function:  A.  Status post defibrillator placement.    5.  Dizziness:  A.  MRI from  done June 7, 2023 negative for metastatic disease.    FOLLOW UP: 3 months with CT chest abdomen pelvis.    Susi Irving MD  10/18/2023

## 2023-10-18 NOTE — PROGRESS NOTES
FOLLOW UP NOTE    PATIENT:                                                      Gayatri Kahn  MEDICAL RECORD #:                        [id number]  :                                                          1960  COMPLETION DATE:   2023  DIAGNOSIS:     Extensive stage small cell lung cancer  - Initial: Stage IIB (cT1c, cN1, cM0)  - Current: Stage IV          BRIEF HISTORY:  Gayatri Kahn is a 63 y.o. female who presents via telemedicine for initial follow-up visit.  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  The patient was considered too weak for chemotherapy, but did undergo a course of palliative radiotherapy to the right hilum and adjacent mediastinum to a cumulative dose of 45 Gray in 15 fractions, completing 3/15/2021.  Repeat CT scans at that time showed a good response to treatment within the right hilum and no obvious disease elsewhere.  The patient's symptoms from Lambert-Eaton syndrome did not resolve, and she was not considered a good candidate for PCI or systemic therapies.  She has been followed closely with surveillance imaging in the medical oncology clinic since that time.  More recently, repeat PET/CT scan on 2023 revealed complete resolution of activity in the chest, however a new portacaval hypermetabolic mass appeared concerning progression.  There were no additional areas of hypermetabolic activity otherwise identified.  She underwent needle biopsy of the abdominal mass on 2023 with pathology confirming metastatic small cell carcinoma.    She underwent palliative radiotherapy to a dose of 30 Gray in 10 fractions delivered to the biopsy-proven abdominal metastasis, completing 2023.  The patient tolerated treatment well.     The patient reports that  she feels better at this time point.  She has no new complaints.  She is still struggling from a balance standpoint and is dependent on her wheelchair..  The patient has issues with her vision as well.      Patient has follow-up with Dr. Irving scheduled for 2024.        MEDICATIONS: Medication reconciliation for the patient was reviewed and confirmed in the electronic medical record.    Review of Systems - Oncology  A full 14 point review of systems was performed and was negative except as noted in the HPI.       Karnofsky score: 80   KPS 70%      Physical Exam  Vitals and nursing note reviewed.   Constitutional:       Appearance: She is well-developed.      Comments: In wheelchair today   HENT:      Head: Normocephalic and atraumatic.   Eyes:      Conjunctiva/sclera: Conjunctivae normal.      Pupils: Pupils are equal, round, and reactive to light.      Comments: Right lens of glasses blacked out with tape   Neck:      Comments: No obviously enlarged cervical or supraclavicular LAD.  Cardiovascular:      Comments: Patient well perfused. Non cyanotic. No prominent JVD. No pedal edema  Pulmonary:      Effort: Pulmonary effort is normal.      Breath sounds: Normal breath sounds. No stridor. No wheezing.   Abdominal:      General: There is no distension.      Palpations: Abdomen is soft.   Musculoskeletal:         General: Normal range of motion.      Cervical back: Normal range of motion and neck supple.      Comments: Patient moves all extremities spontaneously.    Skin:     General: Skin is warm and dry.   Neurological:      Mental Status: She is alert and oriented to person, place, and time.      Comments: Coordination intact.   Psychiatric:         Behavior: Behavior normal.         Thought Content: Thought content normal.         Judgment: Judgment normal.           Karnofsky score: 80       The following portions of the patient's history were reviewed and updated as appropriate: allergies, current medications,  past family history, past medical history, past social history, past surgical history and problem list.  I have personally requested reviewed and interpreted the patient's images and radiology reports and pathology reports listed below:  CT Abdomen Pelvis With Contrast    Result Date: 10/17/2023  Possible gastritis.  Stable left adrenal lesion, most consistent with adenoma.  CTDI: 5.29 mGy DLP:358.63 mGy.cm  This report was signed and finalized on 10/17/2023 9:10 AM by Parvin Cardenas MD.      CT Chest With Contrast Diagnostic    Result Date: 10/17/2023  Stable posttreatment change.    CTDI: 5.29 mGy DLP:358.63 mGy.cm  This report was signed and finalized on 10/17/2023 9:04 AM by Parvin Cardenas MD.      CT Abdomen Pelvis With Contrast    Result Date: 10/12/2023  1. Moderate diffuse fecal impaction. 2. No acute intra-abdominal process. This study was performed using dose reduction techniques to achieve radiation exposure as low as reasonably achievable (ALARA).    XR chest AP portable    Result Date: 9/18/2023  Improving bibasilar opacities and bilateral pleural effusions. Images reviewed, interpreted, and dictated by Dr. Lashaun Cook. Transcribed by Izzy Goldsmith PA-C.    X-ray chest PA and lateral    Result Date: 9/17/2023  Small bilateral pleural effusions with bibasilar atelectasis. Images reviewed, interpreted, and dictated by Dr. Luis Alberto Elliott. Transcribed by Jose Alonso PA-C.    X-ray chest PA and lateral    Result Date: 9/17/2023  Right-sided pneumonia as above.. Images reviewed, interpreted, and dictated by Dr. Luis Alberto Elliott. Transcribed by Jose Alonso PA-C.    XR chest AP portable    Result Date: 9/15/2023  No significant interval change. Images reviewed, interpreted, and dictated by Dr. Lashaun Cook. Transcribed by Izzy Goldsmith PA-C.    XR chest AP portable    Result Date: 9/14/2023  No significant interval change. Images reviewed, interpreted, and dictated by Dr. Lashaun Cook. Transcribed by Izzy  YEN Goldsmith.    US DOPPLER VENOUS LEGS BILATERAL    Result Date: 9/13/2023  No evidence of deep venous thrombosis of the right lower extremity. LEFT LOWER EXTREMITY VENOUS DUPLEX HISTORY: Pain and swelling Findings: Multiple transverse and longitudinal scans were performed of the femoropopliteal deep venous system, with augmentation and compression maneuvers. Normal phasic flow was noted in the visualized deep venous system. No intraluminal increased echogenicity is noted to suggest thrombus. There is normal compression and augmentation of the venous structures. No abnormal venous collaterals are seen. IMPRESSION: No evidence of deep venous thrombosis of the left lower extremity.    CT Abdomen Pelvis With Contrast    Result Date: 9/13/2023  No dissection or PE. Paratracheal and right hilar adenopathy which may be reactive or neoplastic in nature. This has progressed. Interval development of dense right lower lobe consolidation, probably secondary to pneumonia. Follow-up to complete resolution recommended. CT SCAN OF THE ABDOMEN AND PELVIS WITH CONTRAST;  9/13/2023 11:15 AM HISTORY: Epigastric pain. COMPARISON: None. PROCEDURE: The patient was injected with IV contrast. Axial images were obtained from the lung bases to the pubic symphysis by computed tomography. This study was performed with techniques to keep radiation doses as low as reasonably achievable, (ALARA). Individualized dose reduction techniques using automated exposure control or adjustment of mA and/or kV according to the patient size were employed. FINDINGS: ABDOMEN:  The liver is normal. The spleen is unremarkable. There is a 10 mm hypodense nodule in the left adrenal. The gallbladder is unremarkable. There is an 80% stenosis at the celiac origin. The SMA and BRENNA are nonstenotic. The pancreas is normal. The kidneys are normal. The aorta is normal in caliber. There is no free fluid or adenopathy. PELVIS: The appendix is normal. Post hysterectomy.  The urinary bladder is unremarkable. There is no significant free fluid or adenopathy. IMPRESSION: Celiac stenosis, as above. Otherwise, unremarkable. Images reviewed, interpreted, and dictated by TRINIDAD Perdomo MD    CT chest for pulmonary embolus    Result Date: 9/13/2023  No dissection or PE. Paratracheal and right hilar adenopathy which may be reactive or neoplastic in nature. This has progressed. Interval development of dense right lower lobe consolidation, probably secondary to pneumonia. Follow-up to complete resolution recommended. CT SCAN OF THE ABDOMEN AND PELVIS WITH CONTRAST;  9/13/2023 11:15 AM HISTORY: Epigastric pain. COMPARISON: None. PROCEDURE: The patient was injected with IV contrast. Axial images were obtained from the lung bases to the pubic symphysis by computed tomography. This study was performed with techniques to keep radiation doses as low as reasonably achievable, (ALARA). Individualized dose reduction techniques using automated exposure control or adjustment of mA and/or kV according to the patient size were employed. FINDINGS: ABDOMEN:  The liver is normal. The spleen is unremarkable. There is a 10 mm hypodense nodule in the left adrenal. The gallbladder is unremarkable. There is an 80% stenosis at the celiac origin. The SMA and BRENNA are nonstenotic. The pancreas is normal. The kidneys are normal. The aorta is normal in caliber. There is no free fluid or adenopathy. PELVIS: The appendix is normal. Post hysterectomy. The urinary bladder is unremarkable. There is no significant free fluid or adenopathy. IMPRESSION: Celiac stenosis, as above. Otherwise, unremarkable. Images reviewed, interpreted, and dictated by TRINIDAD Perdomo MD    XR Chest 1 View    Result Date: 9/13/2023  Findings are concerning for right hilar mass/lymphadenopathy, possible recurrence of known malignancy or lymphadenopathy. Medial right lung basal opacity, postobstructive pneumonitis or focal pneumonia.  Left lung basal ill-defined opacity, atelectasis or developing airspace disease. Emphysema. Correlate clinically. Images personally reviewed, interpreted and dictated by ANTONINO Samuels.          CT chest for pulmonary embolus    Result Date: 8/25/2023  No pulmonary embolus. No acute findings in the chest. Moderate centrilobular emphysema. Coronary artery disease. Treated right lung malignancy. Nonspecific left adrenal gland nodular thickening. Correlate with history of metastatic disease. Images personally reviewed, interpreted and dictated by DRAGAN Villafuerte M.D.    XR Chest 1 View    Result Date: 8/25/2023  No acute cardiopulmonary process . Images reviewed, interpreted, and dictated by Dr. TRINIDAD Perdomo. Transcribed by Ronald Mattson PA-C.    CT Needle Biopsy Abdomen    Result Date: 7/12/2023  Impression:                                                              Successful CT guided core biopsy of right portacaval mass. Thank you for the opportunity to assist in the care of your patient. Electronically Signed: Davi Lopez  7/12/2023 4:39 PM EDT  Workstation ID: [id number]    CT Chest With Contrast Diagnostic    Result Date: 6/26/2023  1. Stable posttreatment changes   This study was performed with techniques to keep radiation doses as low as reasonably achievable (ALARA). Individualized dose reduction techniques using automated exposure control or adjustment of vA and/or kV according to the patient size were employed.  This report was signed and finalized on 6/26/2023 4:53 PM by Ezequiel Shah MD.       I personally viewed the patient's radiation treatment plans.  I reviewed the patient's pretreatment PET scan.  I reviewed the patient's posttreatment CT scan personally.  I Reviewed Dr. Irving's note.         There are no diagnoses linked to this encounter.       IMPRESSION:  Gayatri Kahn is a 63 y.o. 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 Irving since undergoing a palliative course of radiotherapy to the gross disease in the right hilum more than 2 years ago.  She then presented with a biopsy-proven abdominal/portacaval lymph node metastasis that appears to be her only site of active disease on metabolic imaging.  She received a dose of 30 Gray in 10 fractions to this lesion and has done well.  The patient is feeling well at this time point.  On her interval CT scan that lesion has decreased in size.  She has repeat CT scan with Dr. Irving and follow-up with him in person on 17 January.    I would recommend that he follow-up with the patient with a phone call after next CT scan on January 18.    RECOMMENDATIONS:      T1N1 right hilar limited stage small cell lung cancer, presumptive diagnosis  -Serologies positive for Lambert-Eaton syndrome Ag NA-1 antibody + does have a high specificity for small cell lung cancer              -would recommend repeating at the end of treatments  -PET scan shows a mass at the right hilum with SUV of 14 which is highly suspicious for cancer  -Completed 45 Gray in 15 fractions to the right hilum and adjacent mediastinum on 3/15/21  -Did not receive PCI    Brain surveillance:  -Last MRI in June 2023 with no evidence of recurrence     Recurred in portacaval region with lymph node  -Status post 30 Gray in 10 fractions radiotherapy 8/11/2023  -Doing well  -Good interim response based on interim CT scan.       Tobacco abuse  -The patient has been able to stop smoking which is excellent    No follow-ups on file.    Owen Cardenas MD

## 2023-10-24 ENCOUNTER — OFFICE VISIT (OUTPATIENT)
Dept: CARDIAC SURGERY | Facility: HOSPITAL | Age: 63
End: 2023-10-24
Payer: MEDICARE

## 2023-10-24 VITALS
DIASTOLIC BLOOD PRESSURE: 62 MMHG | WEIGHT: 152 LBS | BODY MASS INDEX: 24.43 KG/M2 | HEIGHT: 66 IN | TEMPERATURE: 98.1 F | HEART RATE: 92 BPM | SYSTOLIC BLOOD PRESSURE: 112 MMHG | OXYGEN SATURATION: 95 %

## 2023-10-24 DIAGNOSIS — C77.2 SECONDARY MALIGNANT NEOPLASM OF INTRA-ABDOMINAL LYMPH NODES: ICD-10-CM

## 2023-10-24 DIAGNOSIS — G45.9 TRANSIENT ISCHEMIC ATTACK: ICD-10-CM

## 2023-10-24 DIAGNOSIS — I77.4 MEDIAN ARCUATE LIGAMENT SYNDROME: ICD-10-CM

## 2023-10-24 DIAGNOSIS — K55.1 SUPERIOR MESENTERIC ARTERY STENOSIS: ICD-10-CM

## 2023-10-24 DIAGNOSIS — I50.9 CONGESTIVE HEART FAILURE, UNSPECIFIED HF CHRONICITY, UNSPECIFIED HEART FAILURE TYPE: ICD-10-CM

## 2023-10-24 DIAGNOSIS — I73.9 PERIPHERAL VASCULAR DISEASE: ICD-10-CM

## 2023-10-24 DIAGNOSIS — C34.31 SMALL CELL LUNG CANCER, RIGHT LOWER LOBE: ICD-10-CM

## 2023-10-24 DIAGNOSIS — M51.36 DEGENERATIVE DISC DISEASE, LUMBAR: Primary | ICD-10-CM

## 2023-10-24 PROCEDURE — 99203 OFFICE O/P NEW LOW 30 MIN: CPT | Performed by: STUDENT IN AN ORGANIZED HEALTH CARE EDUCATION/TRAINING PROGRAM

## 2023-10-24 NOTE — PROGRESS NOTES
10/24/2023  Patient Information  Gayatri Kahn                                                                                          [address]  [place] [address]   1960  'PCP/Referring Physician'  Jaqueline Jean APRN  [phone]  Jaqueline Jean, AP*  [phone]  Chief Complaint   Patient presents with    Consult     New pt per Arlette SEPULVEDA for Celiac artery compression syndrome. Pt states that she is here for a consult after CT A/\P showed stenosis of the celiac. Pt states that she is very fatigued. SOB with exertion.        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  The patient reports that she is being currently treated palliatively for small cell lung cancer metastatic to the liver, and has had radiation recently.  She denies food fear, weight loss, abdominal pain, severe bloating, nausea/vomiting/diarrhea.  She is accompanied by her male family member.      Patient Active Problem List   Diagnosis    Degenerative disc disease, lumbar    Adenopathy, hilar    Small cell lung cancer, right lower lobe    Lambert-Eaton syndrome    Age-related nuclear cataract of both eyes    Alternating esotropia    Binocular vision disorder with diplopia    Congestive heart failure    Encounter for screening for malignant neoplasm of other sites    Encounter for long-term (current) use of insulin    Hashimoto's thyroiditis    Hyperopia of both eyes with astigmatism and presbyopia    Hyponatremia    Inflammation of sacroiliac joint    Intervertebral disc disorder    Low back pain    Lumbosacral spondylosis without myelopathy    Lung nodule    Mild to moderate dysarthria    Transient ischemic attack    Vertical strabismus of right eye    Secondary  malignant neoplasm of intra-abdominal lymph nodes     Past Medical History:   Diagnosis Date    Arthritis     Disease of thyroid gland     Heart disease     History of radiation therapy 08/11/2023    Abdominal metastasis    Hyperlipidemia     Lambert-Eaton syndrome     Lung cancer 02/2021    XRT (45 Gray/15 fractions) - completed 3/2021    Metastatic cancer     Pneumonia     Requires supplemental oxygen     2L, NC at night and PRN in day     Past Surgical History:   Procedure Laterality Date    FOOT SURGERY      HYSTERECTOMY      KNEE SURGERY         Current Outpatient Medications:     albuterol sulfate  (90 Base) MCG/ACT inhaler, Inhale 2 puffs Every 4 (Four) Hours As Needed., Disp: , Rfl:     Amifampridine Phosphate (Firdapse) 10 MG tablet, Take 2 tablets by mouth 4 (Four) Times a Day., Disp: , Rfl:     aspirin 81 MG EC tablet, Take 1 tablet by mouth Daily. 7-6-23 last dose, Disp: , Rfl:     atorvastatin (LIPITOR) 20 MG tablet, Take 1 tablet by mouth Every Night., Disp: , Rfl:     furosemide (LASIX) 40 MG tablet, Take 1 tablet by mouth 2 (Two) Times a Day., Disp: , Rfl:     ipratropium-albuterol (DUO-NEB) 0.5-2.5 mg/3 ml nebulizer, , Disp: , Rfl:     Jardiance 10 MG tablet tablet, , Disp: , Rfl:     levothyroxine (SYNTHROID, LEVOTHROID) 137 MCG tablet, Take 1 tablet by mouth Daily., Disp: , Rfl:     metoprolol tartrate (LOPRESSOR) 50 MG tablet, Take 1 tablet by mouth 2 (Two) Times a Day., Disp: , Rfl:     potassium chloride 10 MEQ CR tablet, Take 1 tablet by mouth 2 (Two) Times a Day., Disp: , Rfl:     sertraline (ZOLOFT) 25 MG tablet, Take 1 tablet by mouth Daily., Disp: , Rfl:     spironolactone (ALDACTONE) 25 MG tablet, Take 1 tablet by mouth Daily., Disp: , Rfl:   Allergies   Allergen Reactions    Erythromycin Nausea Only     Social History     Socioeconomic History    Marital status:    Tobacco Use    Smoking status: Former     Packs/day: 1.00     Years: 15.00     Additional pack years: 0.00      "Total pack years: 15.00     Types: Cigarettes     Quit date: 2022     Years since quittin.2    Smokeless tobacco: Never   Vaping Use    Vaping Use: Never used   Substance and Sexual Activity    Alcohol use: No    Drug use: No    Sexual activity: Defer     Family History   Problem Relation Age of Onset    Heart disease Mother     Kidney disease Mother     Brain cancer Father     Heart disease Sister     Breast cancer Sister     Heart disease Brother      Review of Systems   Constitutional: Positive for malaise/fatigue. Negative for chills, decreased appetite, fever, weight gain and weight loss.   HENT:  Negative for hearing loss.    Cardiovascular:  Positive for dyspnea on exertion (with exertion). Negative for chest pain, claudication, cyanosis, irregular heartbeat, leg swelling, near-syncope, orthopnea, palpitations, paroxysmal nocturnal dyspnea and syncope.   Respiratory:  Positive for shortness of breath.    Endocrine: Negative for polydipsia, polyphagia and polyuria.   Hematologic/Lymphatic: Negative for adenopathy. Bruises/bleeds easily.   Musculoskeletal:  Positive for arthritis. Negative for falls, gout, joint pain, joint swelling, muscle weakness and myalgias.   Gastrointestinal:  Negative for bloating, abdominal pain, anorexia, dysphagia, heartburn, nausea and vomiting.   Genitourinary:  Negative for dysuria and hematuria.   Neurological:  Positive for weakness (lower legs relates to LEMS.). Negative for dizziness, focal weakness, headaches, loss of balance, numbness, seizures and vertigo.   Psychiatric/Behavioral:  Negative for altered mental status, depression, substance abuse and suicidal ideas. The patient is not nervous/anxious.    Allergic/Immunologic: Negative for HIV exposure and persistent infections.   Vitals:    10/24/23 1018   BP: 112/62   Pulse: 92   Temp: 98.1 °F (36.7 °C)   SpO2: 95%   Weight: 68.9 kg (152 lb)   Height: 167.6 cm (66\")      Physical Exam  General no acute distress, " pleasant, interactive  Head normocephalic, atraumatic  Eyes clear sclerae, eye patch on left eye  ENT no discharge, neck supple  Mouth mucous membranes moist  Cardiac regular rate rhythm, no murmurs rubs gallops  Vascular warm well perfused x4 extremities  Pulmonary lungs clear to auscultation bilaterally  Abdomen soft nontender nondistended  Lymphatic no edema bilateral lower extremities  Neurological grossly intact  Psychological appropriate  Dermatological no lesions in sun exposed areas  Musculoskeletal normal tone and bulk      The ROS, past medical history, surgical history, family history, social history, and vitals were reviewed by myself and corrected as needed.      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  The patient reports that she is being currently treated palliatively for small cell lung cancer metastatic to the liver, and has had radiation recently.  She denies food fear, weight loss, abdominal pain, severe bloating, nausea/vomiting/diarrhea.  The patient has a very poor prognosis given her medical comorbidities including metastatic small cell of the lung.  She is essentially asymptomatic from the standpoint of her mesenteric stenosis.  I invited her to return to my clinic in 6-12 months for reevaluation.    I would like to thank you for the opportunity to participate in the care of this very pleasant patient.     Peter Acevedo M.D., R.P.V.I.  Cardiothoracic and Vascular Surgeon  [facility]  [place]        Patient Active Problem List   Diagnosis    Degenerative disc disease, lumbar    Adenopathy, hilar    Small cell lung cancer, right lower lobe    Lambert-Eaton syndrome    Age-related nuclear cataract of both eyes    Alternating esotropia    Binocular vision disorder with diplopia    Congestive heart failure    Encounter for screening for malignant neoplasm of other sites    Encounter for long-term (current) use of insulin    Hashimoto's thyroiditis    Hyperopia of both eyes with astigmatism and presbyopia    Hyponatremia    Inflammation of sacroiliac joint    Intervertebral disc disorder    Low back pain    Lumbosacral spondylosis without myelopathy    Lung nodule    Mild to moderate dysarthria    Transient ischemic attack    Vertical strabismus of right eye    Secondary malignant neoplasm of intra-abdominal lymph nodes

## 2023-10-27 ENCOUNTER — PATIENT ROUNDING (BHMG ONLY) (OUTPATIENT)
Dept: CARDIAC SURGERY | Facility: CLINIC | Age: 63
End: 2023-10-27
Payer: MEDICARE

## 2023-10-27 NOTE — PROGRESS NOTES
October 27, 2023    Hello, may I speak with Gayatri Kahn?    My name is Marielle    I am  with [facility]  [address]  [place] [facility] [place] [address] [phone].    Before we get started may I verify your date of birth? 1960    I am calling to officially welcome you to our practice and ask about your recent visit. Is this a good time to talk? yes    Tell me about your visit with us. What things went well?  Everything went well. People were nice.       We're always looking for ways to make our patients' experiences even better. Do you have recommendations on ways we may improve?  no    Overall were you satisfied with your first visit to our practice? yes       I appreciate you taking the time to speak with me today. Is there anything else I can do for you? no      Thank you, and have a great day.

## 2024-01-15 ENCOUNTER — TELEPHONE (OUTPATIENT)
Dept: ONCOLOGY | Facility: CLINIC | Age: 64
End: 2024-01-15

## 2024-01-18 ENCOUNTER — HOSPITAL ENCOUNTER (OUTPATIENT)
Dept: RADIATION ONCOLOGY | Facility: HOSPITAL | Age: 64
Setting detail: RADIATION/ONCOLOGY SERIES
Discharge: HOME | End: 2024-01-18
Payer: MEDICARE

## 2024-02-01 ENCOUNTER — HOSPITAL ENCOUNTER (OUTPATIENT)
Dept: CT IMAGING | Facility: HOSPITAL | Age: 64
Discharge: HOME | End: 2024-02-01
Payer: MEDICARE

## 2024-02-01 DIAGNOSIS — C34.31 SMALL CELL LUNG CANCER, RIGHT LOWER LOBE: ICD-10-CM

## 2024-02-01 PROCEDURE — 74177 CT ABD & PELVIS W/CONTRAST: CPT

## 2024-02-01 PROCEDURE — 71260 CT THORAX DX C+: CPT

## 2024-02-01 PROCEDURE — 25510000001 IOPAMIDOL 61 % SOLUTION: Performed by: INTERNAL MEDICINE

## 2024-02-01 RX ADMIN — IOPAMIDOL 100 ML: 612 INJECTION, SOLUTION INTRAVENOUS at 07:52

## 2024-02-07 ENCOUNTER — OFFICE VISIT (OUTPATIENT)
Dept: ONCOLOGY | Facility: CLINIC | Age: 64
End: 2024-02-07
Payer: MEDICARE

## 2024-02-07 VITALS
SYSTOLIC BLOOD PRESSURE: 99 MMHG | OXYGEN SATURATION: 95 % | BODY MASS INDEX: 23.95 KG/M2 | DIASTOLIC BLOOD PRESSURE: 63 MMHG | RESPIRATION RATE: 16 BRPM | WEIGHT: 149 LBS | HEIGHT: 66 IN | TEMPERATURE: 97.1 F | HEART RATE: 91 BPM

## 2024-02-07 DIAGNOSIS — C34.31 SMALL CELL LUNG CANCER, RIGHT LOWER LOBE: Primary | ICD-10-CM

## 2024-02-07 DIAGNOSIS — C77.2 SECONDARY MALIGNANT NEOPLASM OF INTRA-ABDOMINAL LYMPH NODES: ICD-10-CM

## 2024-02-07 RX ORDER — SODIUM CHLORIDE 9 MG/ML
20 INJECTION, SOLUTION INTRAVENOUS ONCE
OUTPATIENT
Start: 2024-03-06

## 2024-02-07 RX ORDER — SODIUM CHLORIDE 9 MG/ML
20 INJECTION, SOLUTION INTRAVENOUS ONCE
OUTPATIENT
Start: 2024-02-15

## 2024-02-07 RX ORDER — PALONOSETRON 0.05 MG/ML
0.25 INJECTION, SOLUTION INTRAVENOUS ONCE
OUTPATIENT
Start: 2024-03-06

## 2024-02-07 RX ORDER — SODIUM CHLORIDE 9 MG/ML
20 INJECTION, SOLUTION INTRAVENOUS ONCE
OUTPATIENT
Start: 2024-02-16

## 2024-02-07 RX ORDER — SODIUM CHLORIDE 9 MG/ML
20 INJECTION, SOLUTION INTRAVENOUS ONCE
OUTPATIENT
Start: 2024-02-14

## 2024-02-07 RX ORDER — SODIUM CHLORIDE 9 MG/ML
20 INJECTION, SOLUTION INTRAVENOUS ONCE
OUTPATIENT
Start: 2024-03-07

## 2024-02-07 RX ORDER — POTASSIUM CHLORIDE 20 MEQ/1
20 TABLET, EXTENDED RELEASE ORAL DAILY
COMMUNITY

## 2024-02-07 RX ORDER — BUDESONIDE 0.5 MG/2ML
INHALANT ORAL
COMMUNITY

## 2024-02-07 RX ORDER — SODIUM CHLORIDE 9 MG/ML
20 INJECTION, SOLUTION INTRAVENOUS ONCE
OUTPATIENT
Start: 2024-03-08

## 2024-02-07 RX ORDER — LEVALBUTEROL INHALATION SOLUTION 1.25 MG/3ML
SOLUTION RESPIRATORY (INHALATION)
COMMUNITY

## 2024-02-07 RX ORDER — PALONOSETRON 0.05 MG/ML
0.25 INJECTION, SOLUTION INTRAVENOUS ONCE
OUTPATIENT
Start: 2024-02-14

## 2024-02-07 NOTE — PROGRESS NOTES
DATE OF VISIT: 2/7/2024    REASON FOR VISIT: Followup for right lower lobe small cell lung cancer     PROBLEM LIST:  1. Right lower lobe small cell lung cancer T1 N1 M0 stage IIb:  A.  Presented with ataxia and lower extremities weakness  B.  Negative bronchoscopy August 19, 2020 and endobronchial ultrasound September 9, 2020  C.  CT chest done December 7, 2020 revealed 2.7 cm right hilar mass  D.  Whole-body PET scan done January 11, 2020 revealed isolated hypermetabolic activity at the right hilar mass SUV of 12  E. positive serology for AGN A-1 antibody which is 92% predicted for small cell lung cancer, positive N type calcium channel blocker and P/Q type calcium channel blooker.  F.  Started chest radiation February 26, 2021, completed end of March 2021  G.  Repeated PET scan done June 08, 2023 revealed complete resolution of activity in the chest however new portacaval hypermetabolic mass.  Biopsy-proven small cell cancer July 12, 2023  H.  Treated with definitive radiotherapy, completed July 2023.  I.  Progressive disease with worsening para-aortic lymphadenopathy document CT scans in February 1, 2024.  J.  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  Hypertension  4.  Hypercholesteremia      HISTORY OF PRESENT ILLNESS: The patient is a very pleasant 63 y.o. female  with past medical history significant for suspected small cell lung cancer of the right lung. The patient never had a positive biopsy however her serology was suggestive of SCLCA after presenting with Lambert-Eaton syndrome. The patient is here today for scheduled follow up visit.     SUBJECTIVE: Gayatri is here today with her .  She is in wheelchair.  She is complaining of dizziness.    Past History:  Medical History: has a past medical history of Arthritis, Disease of thyroid gland, Heart disease, History of radiation therapy (08/11/2023),  Hyperlipidemia, Lambert-Eaton syndrome, Lung cancer (02/2021), Metastatic cancer, Pneumonia, and Requires supplemental oxygen.   Surgical History: has a past surgical history that includes Hysterectomy; Foot surgery; and Knee surgery.   Family History: family history includes Brain cancer in her father; Breast cancer in her sister; Heart disease in her brother, mother, and sister; Kidney disease in her mother.   Social History: reports that she quit smoking about 18 months ago. Her smoking use included cigarettes. She has a 15.00 pack-year smoking history. She has never used smokeless tobacco. She reports that she does not drink alcohol and does not use drugs.    (Not in a hospital admission)     Allergies: Erythromycin     Review of Systems   Constitutional:  Positive for fatigue.   Respiratory: Negative.     Cardiovascular: Negative.    Gastrointestinal: Negative.          Current Outpatient Medications:     albuterol sulfate  (90 Base) MCG/ACT inhaler, Inhale 2 puffs Every 4 (Four) Hours As Needed., Disp: , Rfl:     Amifampridine Phosphate (Firdapse) 10 MG tablet, Take 2 tablets by mouth 4 (Four) Times a Day., Disp: , Rfl:     aspirin 81 MG EC tablet, Take 1 tablet by mouth Daily. 7-6-23 last dose, Disp: , Rfl:     atorvastatin (LIPITOR) 20 MG tablet, Take 1 tablet by mouth Every Night., Disp: , Rfl:     furosemide (LASIX) 40 MG tablet, Take 1 tablet by mouth 2 (Two) Times a Day., Disp: , Rfl:     ipratropium-albuterol (DUO-NEB) 0.5-2.5 mg/3 ml nebulizer, , Disp: , Rfl:     Jardiance 10 MG tablet tablet, , Disp: , Rfl:     levothyroxine (SYNTHROID, LEVOTHROID) 137 MCG tablet, Take 1 tablet by mouth Daily., Disp: , Rfl:     metoprolol tartrate (LOPRESSOR) 50 MG tablet, Take 1 tablet by mouth 2 (Two) Times a Day., Disp: , Rfl:     potassium chloride 10 MEQ CR tablet, Take 1 tablet by mouth 2 (Two) Times a Day., Disp: , Rfl:     sertraline (ZOLOFT) 25 MG tablet, Take 1 tablet by mouth Daily., Disp: , Rfl:      "spironolactone (ALDACTONE) 25 MG tablet, Take 1 tablet by mouth Daily., Disp: , Rfl:     PHYSICAL EXAMINATION:   Ht 167.6 cm (66\")   BMI 24.53 kg/m²    There were no vitals filed for this visit.       ECOG score: 1            ECOG Performance Status: 2 - Symptomatic, <50% confined to bed  General Appearance:  alert, cooperative, no apparent distress and appears stated age   Neurologic/Psychiatric: A&O x 3, gait steady, appropriate affect, strength 5/5 in all muscle groups   HEENT:  Normocephalic, without obvious abnormality, mucous membranes moist   Neck: Supple, symmetrical, trachea midline, no adenopathy;  No thyromegaly, masses, or tenderness   Lungs:   Clear to auscultation bilaterally; respirations regular, even, and unlabored bilaterally   Heart:  Regular rate and rhythm, no murmurs appreciated   Abdomen:   Soft, non-tender, non-distended, and no organomegaly   Lymph nodes: No cervical, supraclavicular, inguinal or axillary adenopathy noted   Extremities: Normal, atraumatic; no clubbing, cyanosis, or edema    Skin: No rashes, ulcers, or suspicious lesions noted     No visits with results within 2 Week(s) from this visit.   Latest known visit with results is:   Hospital Outpatient Visit on 07/12/2023   Component Date Value Ref Range Status    Glucose 07/12/2023 91  65 - 99 mg/dL Final    BUN 07/12/2023 12  8 - 23 mg/dL Final    Creatinine 07/12/2023 0.63  0.57 - 1.00 mg/dL Final    Sodium 07/12/2023 144  136 - 145 mmol/L Final    Potassium 07/12/2023 4.2  3.5 - 5.2 mmol/L Final    Chloride 07/12/2023 102  98 - 107 mmol/L Final    CO2 07/12/2023 33.0 (H)  22.0 - 29.0 mmol/L Final    Calcium 07/12/2023 10.0  8.6 - 10.5 mg/dL Final    BUN/Creatinine Ratio 07/12/2023 19.0  7.0 - 25.0 Final    Anion Gap 07/12/2023 9.0  5.0 - 15.0 mmol/L Final    eGFR 07/12/2023 100.4  >60.0 mL/min/1.73 Final    Protime 07/12/2023 13.0  12.2 - 14.5 Seconds Final    INR 07/12/2023 0.97  0.89 - 1.12 Final    WBC 07/12/2023 6.55  3.40 " - 10.80 10*3/mm3 Final    RBC 07/12/2023 4.53  3.77 - 5.28 10*6/mm3 Final    Hemoglobin 07/12/2023 12.3  12.0 - 15.9 g/dL Final    Hematocrit 07/12/2023 40.1  34.0 - 46.6 % Final    MCV 07/12/2023 88.5  79.0 - 97.0 fL Final    MCH 07/12/2023 27.2  26.6 - 33.0 pg Final    MCHC 07/12/2023 30.7 (L)  31.5 - 35.7 g/dL Final    RDW 07/12/2023 16.2 (H)  12.3 - 15.4 % Final    RDW-SD 07/12/2023 52.9  37.0 - 54.0 fl Final    MPV 07/12/2023 9.4  6.0 - 12.0 fL Final    Platelets 07/12/2023 294  140 - 450 10*3/mm3 Final    Neutrophil % 07/12/2023 60.9  42.7 - 76.0 % Final    Lymphocyte % 07/12/2023 27.5  19.6 - 45.3 % Final    Monocyte % 07/12/2023 6.4  5.0 - 12.0 % Final    Eosinophil % 07/12/2023 3.8  0.3 - 6.2 % Final    Basophil % 07/12/2023 1.2  0.0 - 1.5 % Final    Immature Grans % 07/12/2023 0.2  0.0 - 0.5 % Final    Neutrophils, Absolute 07/12/2023 3.99  1.70 - 7.00 10*3/mm3 Final    Lymphocytes, Absolute 07/12/2023 1.80  0.70 - 3.10 10*3/mm3 Final    Monocytes, Absolute 07/12/2023 0.42  0.10 - 0.90 10*3/mm3 Final    Eosinophils, Absolute 07/12/2023 0.25  0.00 - 0.40 10*3/mm3 Final    Basophils, Absolute 07/12/2023 0.08  0.00 - 0.20 10*3/mm3 Final    Immature Grans, Absolute 07/12/2023 0.01  0.00 - 0.05 10*3/mm3 Final    nRBC 07/12/2023 0.0  0.0 - 0.2 /100 WBC Final    Case Report 07/12/2023    Final                    Value:Surgical Pathology Report                         Case: [id number]                                  Authorizing Provider:  Susi Irving MD         Collected:           07/12/2023 11:45 AM          Ordering Location:     [facility]   Received:            07/12/2023 12:52 PM                                 CT                                                                           Pathologist:           Ezequiel Navarrete MD                                                             Specimen:    Abdomen                                                                                     Clinical Information 07/12/2023    Final                    Value:This result contains rich text formatting which cannot be displayed here.    Final Diagnosis 07/12/2023    Final                    Value:This result contains rich text formatting which cannot be displayed here.    Comment 07/12/2023    Final                    Value:This result contains rich text formatting which cannot be displayed here.    Gross Description 07/12/2023    Final                    Value:This result contains rich text formatting which cannot be displayed here.    Microscopic Description 07/12/2023    Final                    Value:This result contains rich text formatting which cannot be displayed here.        CT Abdomen Pelvis With Contrast    Result Date: 2/1/2024  Narrative: PROCEDURE: CT ABDOMEN PELVIS W CONTRAST-  HISTORY: f/u scans; C34.31-Malignant neoplasm of lower lobe, right bronchus or lung  COMPARISON: 10/17/2023.  PROCEDURE: The patient was injected with IV contrast. Axial images were obtained from the lung bases to the pubic symphysis by computed tomography. This study was performed with techniques to keep radiation doses as low as reasonably achievable, (ALARA). Individualized dose reduction techniques using automated exposure control or adjustment of mA and/or kV according to the patient size were employed.  FINDINGS:  ABDOMEN: The lung bases are clear. The heart is proper size. The liver locking demonstrates diffuse fatty infiltration with no focal mass. Gallbladder present with no CT visible stones. The spleen is unremarkable. Right adrenal gland appears normal. Hypodense left adrenal mass is stable. The pancreas is unremarkable. The kidneys are unremarkable, without evidence of mass or hydronephrosis. The aorta is proper caliber. No free fluid identified. There is new left periaortic adenopathy. On coronal image 41 there appear to be 2 immediately adjacent lymph nodes measuring up to 7 cm in length consistent with  metastasis. Other smaller periaortic lymph nodes seen as well as a precaval lymph node. Adenopathy extends into the left common iliac chain. No suspiciously enlarged external iliac lymph nodes or inguinal lymph nodes identified.  PELVIS: The GI tract demonstrates no obstruction.  The appendix is identified and appears normal. The urinary bladder is partially filled and appears normal. There is wall thickening of the descending, proximal and mid sigmoid colon new from prior and suggesting colitis. No bony destructive lesion seen. There is no free fluid, adenopathy, or inflammatory process.      Impression: Significant periaortic adenopathy representing a significant change compared to prior.  Descending and sigmoid colitis.  CTDI: 5.26 mGy DLP:358.42 mGy.cm  This report was signed and finalized on 2/1/2024 8:54 AM by Parvin Cardenas MD.      CT Chest With Contrast Diagnostic    Result Date: 2/1/2024  Narrative: PROCEDURE: CT CHEST W CONTRAST DIAGNOSTIC-  HISTORY: f/u scans; C34.31-Malignant neoplasm of lower lobe, right bronchus or lung  COMPARISON: October 17, 2023  PROCEDURE: The patient was injected with IV contrast.  Axial images were obtained from the lung apex to the mid abdomen by computed tomography. This study was performed with techniques to keep radiation doses as low as reasonably achievable, (ALARA). Individualized dose reduction techniques using automated exposure control or adjustment of mA and/or kV according to the patient size were employed.  FINDINGS:  CHEST: There is no suspicious axillary adenopathy. There is no suspicious hilar or mediastinal adenopathy.  The heart is proper size. There is no pericardial or pleural effusion. There is slightly increased linear densities in the right lower lobe consistent with mildly worsened atelectasis. Again noted is a pleural-based round lesion posterior right lower lobe which is stable from the prior exam. Emphysematous change noted bilaterally. Limited images  of the upper abdomen are unremarkable.      Impression: Stable appearance of the chest.    CTDI: 5.26 mGy DLP:358.42 mGy.cm  This report was signed and finalized on 2/1/2024 8:28 AM by Parvin Cardenas MD.         ASSESSMENT: The patient is a very pleasant 63 y.o. female  with small cell lung cancer      PLAN:    1. Right lower lobe small cell lung cancer TxN2 M1b stage [place]elin:  A.  I did go over the scan results with the patient from February 1, 2024.  I reviewed the films myself.  Unfortunately patient has evidence of progressive disease.  B.  At this point I will proceed with chemotherapy and immunotherapy.  The patient will be treated with carboplatin etoposide plus Imfinzi.  C.  I will reduce her chemotherapy dose by 25% given her poor performance status.  D.  I will repeat her scans after cycle #3.  E.  I will consider maintenance Imfinzi after cycle #4.  F. We reviewed the potential side effects of this regimen including fatigue, vomiting and nausea, hair loss, nephropathy, neuropathy, hearing loss, myelosuppression, and risk of infusion reaction.  G. We discussed potential side effects of immunotherapy including but not limited to immune mediated reactions with thyroiditis, pneumonitis, hepatitis, colitis, rash, and electrolyte abnormalities, fatigue, multiorgan failure, and possibly death.  H.  I will monitor the patient blood work including blood counts kidney function liver function and electrolytes.    2. Lambert-Eaton syndrome:  A. She will continue Firdapse 10 mg 4 times daily as prescribed by neurology.  B.  I am hoping this should get better with chemotherapy.  I do think this should be a contraindication for her immunotherapy since chemotherapy with treating the underlying cause for her neurologic syndrome.    3. Hypothyroid:  A. She will continue Synthroid daily    4.  Poor systolic cardiac function:  A.  Status post defibrillator placement.    5.  Dizziness:  A.  MRI from  done June 7, 2023 negative  for metastatic disease.    FOLLOW UP: 4 weeks with cycle #2.    Susi Irving MD  2/7/2024

## 2024-02-12 ENCOUNTER — TELEPHONE (OUTPATIENT)
Dept: ONCOLOGY | Facility: CLINIC | Age: 64
End: 2024-02-12

## 2024-02-12 ENCOUNTER — PATIENT OUTREACH (OUTPATIENT)
Dept: ONCOLOGY | Facility: HOSPITAL | Age: 64
End: 2024-02-12
Payer: MEDICARE

## 2024-02-12 ENCOUNTER — INFUSION (OUTPATIENT)
Dept: ONCOLOGY | Facility: HOSPITAL | Age: 64
End: 2024-02-12
Payer: MEDICARE

## 2024-02-12 ENCOUNTER — OFFICE VISIT (OUTPATIENT)
Dept: ONCOLOGY | Facility: CLINIC | Age: 64
End: 2024-02-12
Payer: MEDICARE

## 2024-02-12 ENCOUNTER — LAB (OUTPATIENT)
Dept: LAB | Facility: HOSPITAL | Age: 64
End: 2024-02-12
Payer: MEDICARE

## 2024-02-12 ENCOUNTER — HOSPITAL ENCOUNTER (OUTPATIENT)
Dept: MRI IMAGING | Facility: HOSPITAL | Age: 64
Discharge: HOME | End: 2024-02-12
Payer: MEDICARE

## 2024-02-12 VITALS
SYSTOLIC BLOOD PRESSURE: 102 MMHG | BODY MASS INDEX: 23.46 KG/M2 | TEMPERATURE: 97.5 F | HEART RATE: 91 BPM | HEIGHT: 66 IN | OXYGEN SATURATION: 97 % | WEIGHT: 146 LBS | RESPIRATION RATE: 12 BRPM | DIASTOLIC BLOOD PRESSURE: 57 MMHG

## 2024-02-12 DIAGNOSIS — C77.2 SECONDARY MALIGNANT NEOPLASM OF INTRA-ABDOMINAL LYMPH NODES: ICD-10-CM

## 2024-02-12 DIAGNOSIS — C34.31 SMALL CELL LUNG CANCER, RIGHT LOWER LOBE: ICD-10-CM

## 2024-02-12 DIAGNOSIS — C77.2 SECONDARY MALIGNANT NEOPLASM OF INTRA-ABDOMINAL LYMPH NODES: Primary | ICD-10-CM

## 2024-02-12 LAB
ALBUMIN SERPL-MCNC: 4.4 G/DL (ref 3.5–5.2)
ALBUMIN/GLOB SERPL: 1.2 G/DL
ALP SERPL-CCNC: 138 U/L (ref 39–117)
ALT SERPL W P-5'-P-CCNC: 21 U/L (ref 1–33)
ANION GAP SERPL CALCULATED.3IONS-SCNC: 8.2 MMOL/L (ref 5–15)
AST SERPL-CCNC: 23 U/L (ref 1–32)
BASOPHILS # BLD AUTO: 0.08 10*3/MM3 (ref 0–0.2)
BASOPHILS NFR BLD AUTO: 1.5 % (ref 0–1.5)
BILIRUB SERPL-MCNC: 0.2 MG/DL (ref 0–1.2)
BUN SERPL-MCNC: 10 MG/DL (ref 8–23)
BUN/CREAT SERPL: 14.1 (ref 7–25)
CALCIUM SPEC-SCNC: 10 MG/DL (ref 8.6–10.5)
CHLORIDE SERPL-SCNC: 99 MMOL/L (ref 98–107)
CO2 SERPL-SCNC: 29.8 MMOL/L (ref 22–29)
CREAT SERPL-MCNC: 0.71 MG/DL (ref 0.57–1)
DEPRECATED RDW RBC AUTO: 42.1 FL (ref 37–54)
EGFRCR SERPLBLD CKD-EPI 2021: 96.3 ML/MIN/1.73
EOSINOPHIL # BLD AUTO: 0.23 10*3/MM3 (ref 0–0.4)
EOSINOPHIL NFR BLD AUTO: 4.3 % (ref 0.3–6.2)
ERYTHROCYTE [DISTWIDTH] IN BLOOD BY AUTOMATED COUNT: 13.4 % (ref 12.3–15.4)
GLOBULIN UR ELPH-MCNC: 3.7 GM/DL
GLUCOSE SERPL-MCNC: 107 MG/DL (ref 65–99)
HCT VFR BLD AUTO: 40.3 % (ref 34–46.6)
HGB BLD-MCNC: 13.4 G/DL (ref 12–15.9)
IMM GRANULOCYTES # BLD AUTO: 0.01 10*3/MM3 (ref 0–0.05)
IMM GRANULOCYTES NFR BLD AUTO: 0.2 % (ref 0–0.5)
LYMPHOCYTES # BLD AUTO: 1.62 10*3/MM3 (ref 0.7–3.1)
LYMPHOCYTES NFR BLD AUTO: 30.2 % (ref 19.6–45.3)
MCH RBC QN AUTO: 28.6 PG (ref 26.6–33)
MCHC RBC AUTO-ENTMCNC: 33.3 G/DL (ref 31.5–35.7)
MCV RBC AUTO: 85.9 FL (ref 79–97)
MONOCYTES # BLD AUTO: 0.38 10*3/MM3 (ref 0.1–0.9)
MONOCYTES NFR BLD AUTO: 7.1 % (ref 5–12)
NEUTROPHILS NFR BLD AUTO: 3.05 10*3/MM3 (ref 1.7–7)
NEUTROPHILS NFR BLD AUTO: 56.7 % (ref 42.7–76)
NRBC BLD AUTO-RTO: 0 /100 WBC (ref 0–0.2)
PLATELET # BLD AUTO: 279 10*3/MM3 (ref 140–450)
PMV BLD AUTO: 10 FL (ref 6–12)
POTASSIUM SERPL-SCNC: 4.5 MMOL/L (ref 3.5–5.2)
PROT SERPL-MCNC: 8.1 G/DL (ref 6–8.5)
RBC # BLD AUTO: 4.69 10*6/MM3 (ref 3.77–5.28)
SODIUM SERPL-SCNC: 137 MMOL/L (ref 136–145)
T4 FREE SERPL-MCNC: 1.62 NG/DL (ref 0.93–1.7)
TSH SERPL DL<=0.05 MIU/L-ACNC: 0.73 UIU/ML (ref 0.27–4.2)
WBC NRBC COR # BLD AUTO: 5.37 10*3/MM3 (ref 3.4–10.8)

## 2024-02-12 PROCEDURE — G2211 COMPLEX E/M VISIT ADD ON: HCPCS | Performed by: NURSE PRACTITIONER

## 2024-02-12 PROCEDURE — 70553 MRI BRAIN STEM W/O & W/DYE: CPT

## 2024-02-12 PROCEDURE — 0 GADOBENATE DIMEGLUMINE 529 MG/ML SOLUTION: Performed by: NURSE PRACTITIONER

## 2024-02-12 PROCEDURE — 85025 COMPLETE CBC W/AUTO DIFF WBC: CPT

## 2024-02-12 PROCEDURE — 99215 OFFICE O/P EST HI 40 MIN: CPT | Performed by: NURSE PRACTITIONER

## 2024-02-12 PROCEDURE — 1160F RVW MEDS BY RX/DR IN RCRD: CPT | Performed by: NURSE PRACTITIONER

## 2024-02-12 PROCEDURE — 36415 COLL VENOUS BLD VENIPUNCTURE: CPT

## 2024-02-12 PROCEDURE — 1159F MED LIST DOCD IN RCRD: CPT | Performed by: NURSE PRACTITIONER

## 2024-02-12 PROCEDURE — 84443 ASSAY THYROID STIM HORMONE: CPT

## 2024-02-12 PROCEDURE — A9577 INJ MULTIHANCE: HCPCS | Performed by: NURSE PRACTITIONER

## 2024-02-12 PROCEDURE — 80053 COMPREHEN METABOLIC PANEL: CPT

## 2024-02-12 PROCEDURE — 1126F AMNT PAIN NOTED NONE PRSNT: CPT | Performed by: NURSE PRACTITIONER

## 2024-02-12 PROCEDURE — 84439 ASSAY OF FREE THYROXINE: CPT

## 2024-02-12 RX ORDER — OLANZAPINE 5 MG/1
5 TABLET ORAL NIGHTLY
Qty: 4 TABLET | Refills: 3 | Status: SHIPPED | OUTPATIENT
Start: 2024-02-12

## 2024-02-12 RX ORDER — ONDANSETRON HYDROCHLORIDE 8 MG/1
8 TABLET, FILM COATED ORAL 3 TIMES DAILY PRN
Qty: 30 TABLET | Refills: 5 | Status: SHIPPED | OUTPATIENT
Start: 2024-02-12

## 2024-02-12 RX ADMIN — GADOBENATE DIMEGLUMINE 13 ML: 529 INJECTION, SOLUTION INTRAVENOUS at 15:06

## 2024-02-14 ENCOUNTER — INFUSION (OUTPATIENT)
Dept: ONCOLOGY | Facility: HOSPITAL | Age: 64
End: 2024-02-14
Payer: MEDICARE

## 2024-02-14 VITALS
RESPIRATION RATE: 18 BRPM | BODY MASS INDEX: 24.1 KG/M2 | SYSTOLIC BLOOD PRESSURE: 99 MMHG | HEART RATE: 89 BPM | WEIGHT: 149.3 LBS | TEMPERATURE: 98 F | DIASTOLIC BLOOD PRESSURE: 65 MMHG | OXYGEN SATURATION: 95 %

## 2024-02-14 DIAGNOSIS — C77.2 SECONDARY MALIGNANT NEOPLASM OF INTRA-ABDOMINAL LYMPH NODES: Primary | ICD-10-CM

## 2024-02-14 DIAGNOSIS — C34.31 SMALL CELL LUNG CANCER, RIGHT LOWER LOBE: ICD-10-CM

## 2024-02-14 PROCEDURE — 25010000002 DEXAMETHASONE SODIUM PHOSPHATE 20 MG/5ML SOLUTION: Performed by: INTERNAL MEDICINE

## 2024-02-14 PROCEDURE — 25010000002 CARBOPLATIN PER 50 MG: Performed by: INTERNAL MEDICINE

## 2024-02-14 PROCEDURE — 25810000003 SODIUM CHLORIDE 0.9 % SOLUTION 250 ML FLEX CONT: Performed by: INTERNAL MEDICINE

## 2024-02-14 PROCEDURE — 25010000002 DURVALUMAB 50 MG/ML SOLUTION 10 ML VIAL: Performed by: INTERNAL MEDICINE

## 2024-02-14 PROCEDURE — 25010000002 ETOPOSIDE 100 MG/5ML SOLUTION 5 ML VIAL: Performed by: INTERNAL MEDICINE

## 2024-02-14 PROCEDURE — 96413 CHEMO IV INFUSION 1 HR: CPT

## 2024-02-14 PROCEDURE — 25810000003 SODIUM CHLORIDE 0.9 % SOLUTION 500 ML FLEX CONT: Performed by: INTERNAL MEDICINE

## 2024-02-14 PROCEDURE — 25010000002 FOSAPREPITANT PER 1 MG: Performed by: INTERNAL MEDICINE

## 2024-02-14 PROCEDURE — 96367 TX/PROPH/DG ADDL SEQ IV INF: CPT

## 2024-02-14 PROCEDURE — 96375 TX/PRO/DX INJ NEW DRUG ADDON: CPT

## 2024-02-14 PROCEDURE — 25010000002 PALONOSETRON 0.25 MG/5ML SOLUTION PREFILLED SYRINGE: Performed by: INTERNAL MEDICINE

## 2024-02-14 PROCEDURE — 96417 CHEMO IV INFUS EACH ADDL SEQ: CPT

## 2024-02-14 RX ORDER — SODIUM CHLORIDE 9 MG/ML
20 INJECTION, SOLUTION INTRAVENOUS ONCE
Status: DISCONTINUED | OUTPATIENT
Start: 2024-02-14 | End: 2024-02-14 | Stop reason: HOSPADM

## 2024-02-14 RX ORDER — PALONOSETRON 0.05 MG/ML
0.25 INJECTION, SOLUTION INTRAVENOUS ONCE
Status: COMPLETED | OUTPATIENT
Start: 2024-02-14 | End: 2024-02-14

## 2024-02-14 RX ADMIN — SODIUM CHLORIDE 130 MG: 9 INJECTION, SOLUTION INTRAVENOUS at 11:45

## 2024-02-14 RX ADMIN — FOSAPREPITANT 150 MG: 150 INJECTION, POWDER, LYOPHILIZED, FOR SOLUTION INTRAVENOUS at 10:15

## 2024-02-14 RX ADMIN — SODIUM CHLORIDE 1500 MG: 9 INJECTION, SOLUTION INTRAVENOUS at 09:10

## 2024-02-14 RX ADMIN — CARBOPLATIN 510 MG: 600 INJECTION, SOLUTION INTRAVENOUS at 11:12

## 2024-02-14 RX ADMIN — DEXAMETHASONE SODIUM PHOSPHATE 12 MG: 4 INJECTION, SOLUTION INTRAMUSCULAR; INTRAVENOUS at 10:53

## 2024-02-14 RX ADMIN — PALONOSETRON 0.25 MG: 0.25 INJECTION, SOLUTION INTRAVENOUS at 10:15

## 2024-02-15 ENCOUNTER — INFUSION (OUTPATIENT)
Dept: ONCOLOGY | Facility: HOSPITAL | Age: 64
End: 2024-02-15
Payer: MEDICARE

## 2024-02-15 VITALS
BODY MASS INDEX: 24.26 KG/M2 | DIASTOLIC BLOOD PRESSURE: 56 MMHG | WEIGHT: 150.3 LBS | OXYGEN SATURATION: 98 % | RESPIRATION RATE: 16 BRPM | TEMPERATURE: 97.7 F | HEART RATE: 78 BPM | SYSTOLIC BLOOD PRESSURE: 90 MMHG

## 2024-02-15 DIAGNOSIS — C34.31 SMALL CELL LUNG CANCER, RIGHT LOWER LOBE: ICD-10-CM

## 2024-02-15 DIAGNOSIS — C77.2 SECONDARY MALIGNANT NEOPLASM OF INTRA-ABDOMINAL LYMPH NODES: Primary | ICD-10-CM

## 2024-02-15 PROCEDURE — 25010000002 DEXAMETHASONE SODIUM PHOSPHATE 20 MG/5ML SOLUTION: Performed by: INTERNAL MEDICINE

## 2024-02-15 PROCEDURE — 25010000002 ETOPOSIDE 100 MG/5ML SOLUTION 5 ML VIAL: Performed by: INTERNAL MEDICINE

## 2024-02-15 PROCEDURE — 96413 CHEMO IV INFUSION 1 HR: CPT

## 2024-02-15 PROCEDURE — 25810000003 SODIUM CHLORIDE 0.9 % SOLUTION 500 ML FLEX CONT: Performed by: INTERNAL MEDICINE

## 2024-02-15 PROCEDURE — 96375 TX/PRO/DX INJ NEW DRUG ADDON: CPT

## 2024-02-15 RX ORDER — SODIUM CHLORIDE 9 MG/ML
20 INJECTION, SOLUTION INTRAVENOUS ONCE
Status: DISCONTINUED | OUTPATIENT
Start: 2024-02-15 | End: 2024-02-15 | Stop reason: HOSPADM

## 2024-02-15 RX ADMIN — SODIUM CHLORIDE 130 MG: 9 INJECTION, SOLUTION INTRAVENOUS at 14:04

## 2024-02-15 RX ADMIN — DEXAMETHASONE SODIUM PHOSPHATE 12 MG: 4 INJECTION, SOLUTION INTRAMUSCULAR; INTRAVENOUS at 13:46

## 2024-02-16 ENCOUNTER — INFUSION (OUTPATIENT)
Dept: ONCOLOGY | Facility: HOSPITAL | Age: 64
End: 2024-02-16
Payer: MEDICARE

## 2024-02-16 VITALS
RESPIRATION RATE: 18 BRPM | BODY MASS INDEX: 24.24 KG/M2 | HEART RATE: 69 BPM | WEIGHT: 150.2 LBS | DIASTOLIC BLOOD PRESSURE: 57 MMHG | TEMPERATURE: 98.6 F | SYSTOLIC BLOOD PRESSURE: 106 MMHG

## 2024-02-16 DIAGNOSIS — C34.31 SMALL CELL LUNG CANCER, RIGHT LOWER LOBE: ICD-10-CM

## 2024-02-16 DIAGNOSIS — C77.2 SECONDARY MALIGNANT NEOPLASM OF INTRA-ABDOMINAL LYMPH NODES: Primary | ICD-10-CM

## 2024-02-16 PROCEDURE — 25810000003 SODIUM CHLORIDE 0.9 % SOLUTION 500 ML FLEX CONT: Performed by: INTERNAL MEDICINE

## 2024-02-16 PROCEDURE — 25010000002 DEXAMETHASONE SODIUM PHOSPHATE 20 MG/5ML SOLUTION: Performed by: INTERNAL MEDICINE

## 2024-02-16 PROCEDURE — 25010000002 ETOPOSIDE 100 MG/5ML SOLUTION 5 ML VIAL: Performed by: INTERNAL MEDICINE

## 2024-02-16 PROCEDURE — 96413 CHEMO IV INFUSION 1 HR: CPT

## 2024-02-16 PROCEDURE — 96375 TX/PRO/DX INJ NEW DRUG ADDON: CPT

## 2024-02-16 RX ORDER — SODIUM CHLORIDE 9 MG/ML
20 INJECTION, SOLUTION INTRAVENOUS ONCE
Status: DISCONTINUED | OUTPATIENT
Start: 2024-02-16 | End: 2024-02-16 | Stop reason: HOSPADM

## 2024-02-16 RX ADMIN — SODIUM CHLORIDE 130 MG: 9 INJECTION, SOLUTION INTRAVENOUS at 13:48

## 2024-02-16 RX ADMIN — DEXAMETHASONE SODIUM PHOSPHATE 12 MG: 4 INJECTION, SOLUTION INTRAMUSCULAR; INTRAVENOUS at 13:22

## 2024-02-29 ENCOUNTER — HOSPITAL ENCOUNTER (OUTPATIENT)
Dept: RADIATION ONCOLOGY | Facility: HOSPITAL | Age: 64
Setting detail: RADIATION/ONCOLOGY SERIES
Discharge: HOME | End: 2024-02-29
Payer: MEDICARE

## 2024-02-29 ENCOUNTER — OFFICE VISIT (OUTPATIENT)
Dept: RADIATION ONCOLOGY | Facility: HOSPITAL | Age: 64
End: 2024-02-29
Payer: MEDICARE

## 2024-02-29 DIAGNOSIS — C77.2 SECONDARY MALIGNANT NEOPLASM OF INTRA-ABDOMINAL LYMPH NODES: Primary | ICD-10-CM

## 2024-02-29 NOTE — PROGRESS NOTES
FOLLOW UP NOTE    PATIENT:                                                      Gayatri Kahn  MEDICAL RECORD #:                        [id number]  :                                                          1960  COMPLETION DATE:   2023  DIAGNOSIS:     Extensive stage small cell lung cancer  - Initial: Stage IIB (cT1c, cN1, cM0)  - Current: Stage IV    This was a telehealth visit today per the patient request.  The patient preferred method of communication was the phone.        BRIEF HISTORY:  Gayatri Kahn is a 63 y.o. female who presents via telemedicine for initial follow-up visit.  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  The patient was considered too weak for chemotherapy, but did undergo a course of palliative radiotherapy to the right hilum and adjacent mediastinum to a cumulative dose of 45 Gray in 15 fractions, completing 3/15/2021.  Repeat CT scans at that time showed a good response to treatment within the right hilum and no obvious disease elsewhere.  The patient's symptoms from Lambert-Eaton syndrome did not resolve, and she was not considered a good candidate for PCI or systemic therapies.  She has been followed closely with surveillance imaging in the medical oncology clinic since that time.  More recently, repeat PET/CT scan on 2023 revealed complete resolution of activity in the chest, however a new portacaval hypermetabolic mass appeared concerning progression.  There were no additional areas of hypermetabolic activity otherwise identified.  She underwent needle biopsy of the abdominal mass on 2023 with pathology confirming metastatic small cell carcinoma.    She underwent palliative radiotherapy to a dose of 30 Gray in 10 fractions delivered to the  biopsy-proven abdominal metastasis, completing 8/11/2023.  The patient tolerated treatment well.     The patient had initial good response but unfortunately failed further down the retroperitoneum.  She is being restarted on chemotherapy/immunotherapy with Dr. Irving.          MEDICATIONS:   Current Outpatient Medications:     albuterol sulfate  (90 Base) MCG/ACT inhaler, Inhale 2 puffs Every 4 (Four) Hours As Needed., Disp: , Rfl:     Amifampridine Phosphate (Firdapse) 10 MG tablet, Take 2 tablets by mouth 4 (Four) Times a Day., Disp: , Rfl:     aspirin 81 MG EC tablet, Take 1 tablet by mouth Daily. 7-6-23 last dose, Disp: , Rfl:     atorvastatin (LIPITOR) 20 MG tablet, Take 1 tablet by mouth Every Night., Disp: , Rfl:     budesonide (PULMICORT) 0.5 MG/2ML nebulizer solution, , Disp: , Rfl:     furosemide (LASIX) 40 MG tablet, Take 1 tablet by mouth 2 (Two) Times a Day., Disp: , Rfl:     ipratropium-albuterol (DUO-NEB) 0.5-2.5 mg/3 ml nebulizer, , Disp: , Rfl:     levalbuterol (XOPENEX) 1.25 MG/3ML nebulizer solution, , Disp: , Rfl:     levothyroxine (SYNTHROID, LEVOTHROID) 137 MCG tablet, Take 1 tablet by mouth Daily., Disp: , Rfl:     metoprolol tartrate (LOPRESSOR) 50 MG tablet, Take 1 tablet by mouth 2 (Two) Times a Day., Disp: , Rfl:     OLANZapine (zyPREXA) 5 MG tablet, Take 1 tablet by mouth Every Night. Take nightly x 4 starting night of chemotherapy., Disp: 4 tablet, Rfl: 3    ondansetron (ZOFRAN) 8 MG tablet, Take 1 tablet by mouth 3 (Three) Times a Day As Needed for Nausea or Vomiting., Disp: 30 tablet, Rfl: 5    potassium chloride (K-DUR,KLOR-CON) 20 MEQ CR tablet, Take 1 tablet by mouth Daily., Disp: , Rfl:     spironolactone (ALDACTONE) 25 MG tablet, Take 1 tablet by mouth Daily., Disp: , Rfl:     Past Medical History:   Diagnosis Date    Arthritis     Disease of thyroid gland     Heart disease     History of radiation therapy 08/11/2023    Abdominal metastasis    Hyperlipidemia      Lambert-Eaton syndrome     Lung cancer 2021    XRT (45 Gray/15 fractions) - completed 3/2021    Metastatic cancer     Pneumonia     Requires supplemental oxygen     2L, NC at night and PRN in day     Past Surgical History:   Procedure Laterality Date    FOOT SURGERY      HYSTERECTOMY      KNEE SURGERY       Breast Cancer-related family history includes Breast cancer in her sister.  Social History     Socioeconomic History    Marital status:    Tobacco Use    Smoking status: Former     Packs/day: 1.00     Years: 15.00     Additional pack years: 0.00     Total pack years: 15.00     Types: Cigarettes     Quit date: 2022     Years since quittin.5    Smokeless tobacco: Never   Vaping Use    Vaping Use: Never used   Substance and Sexual Activity    Alcohol use: No    Drug use: No    Sexual activity: Defer         Review of Systems - Oncology  A full 14 point review of systems was performed and was negative except as noted in the HPI.           KPS 70%         Psych: Thought content normal. no tangential thoughts  Neuro: Speech normal.  No slurring.  General: Alert and oriented x4.  No acute distress  HEENT: Voice tone normal.  No raspiness  Pulmonary respiratory: Patient has normal breath sounds.  No not breathing in rales.              The following portions of the patient's history were reviewed and updated as appropriate: allergies, current medications, past family history, past medical history, past social history, past surgical history and problem list.  I have personally requested reviewed and interpreted the patient's images and radiology reports and pathology reports listed below:  CT chest for pulmonary embolus    Result Date: 2024  No evidence of pulmonary embolism. Significant resolution of previous right lower lobe consolidation. Subtle patchy opacities in the posterior right upper lobe, consolidations right middle lobe, likely infectious or inflammatory. These are new from previous.  Images reviewed, interpreted, and dictated by Danny Calixto DO    MRI Brain With & Without Contrast    Result Date: 2/12/2024  No evidence of metastatic brain disease.   This report was signed and finalized on 2/12/2024 4:12 PM by Ezequiel Shah MD.      CT Abdomen Pelvis With Contrast    Result Date: 2/1/2024  Significant periaortic adenopathy representing a significant change compared to prior.  Descending and sigmoid colitis.  CTDI: 5.26 mGy DLP:358.42 mGy.cm  This report was signed and finalized on 2/1/2024 8:54 AM by Parvin Cardenas MD.      CT Chest With Contrast Diagnostic    Result Date: 2/1/2024  Stable appearance of the chest.    CTDI: 5.26 mGy DLP:358.42 mGy.cm  This report was signed and finalized on 2/1/2024 8:28 AM by Parvin Cardenas MD.       I Reviewed Dr. Irving's note.         There are no diagnoses linked to this encounter.       IMPRESSION:  Gayatri Kahn is a 63 y.o. 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 Irving since undergoing a palliative course of radiotherapy to the gross disease in the right hilum more than 2 years ago.  She then presented with a biopsy-proven abdominal/portacaval lymph node metastasis that appears to be her only site of active disease on metabolic imaging.  She received a dose of 30 Gray in 10 fractions to this lesion.    The patient has a durable response at the area treated with radiation but fortunately has failed further down the retroperitoneum.  The patient is being started on chemoimmunotherapy with Dr. Irving.  Will follow-up with the patient as needed in the future.      She could be candidate for additional radiotherapy if needed.  I spent 25 minutes the patient's case today.    RECOMMENDATIONS:        Abdominal recurrence  -Previously treated recurrence at the meño hepatis and the patient was had a good response there  -Patient  has failed further down the retroperitoneum  -Restarting chemo immunotherapy with Dr. Irving  -Could be candidate for treatment of the abdominal lesions if needed  -followup as needed    T1N1 right hilar limited stage small cell lung cancer, presumptive diagnosis  -Serologies positive for Lambert-Eaton syndrome Ag NA-1 antibody + does have a high specificity for small cell lung cancer              -would recommend repeating at the end of treatments  -PET scan shows a mass at the right hilum with SUV of 14 which is highly suspicious for cancer  -Completed 45 Gray in 15 fractions to the right hilum and adjacent mediastinum on 3/15/21  -Did not receive PCI    Brain surveillance:  -Last MRI in June 2023 with no evidence of recurrence     Recurred in portacaval region with lymph node  -Status post 30 Gray in 10 fractions radiotherapy 8/11/2023  -Doing well  -Good interim response based on interim CT scan.       Tobacco abuse  -The patient has been able to stop smoking which is excellent    No follow-ups on file.    Owen Cardenas MD

## 2024-03-06 ENCOUNTER — INFUSION (OUTPATIENT)
Dept: ONCOLOGY | Facility: HOSPITAL | Age: 64
End: 2024-03-06
Payer: MEDICARE

## 2024-03-06 ENCOUNTER — OFFICE VISIT (OUTPATIENT)
Dept: ONCOLOGY | Facility: CLINIC | Age: 64
End: 2024-03-06
Payer: MEDICARE

## 2024-03-06 VITALS
BODY MASS INDEX: 23.78 KG/M2 | SYSTOLIC BLOOD PRESSURE: 124 MMHG | WEIGHT: 148 LBS | RESPIRATION RATE: 16 BRPM | HEIGHT: 66 IN | OXYGEN SATURATION: 94 % | HEART RATE: 94 BPM | TEMPERATURE: 97.3 F | DIASTOLIC BLOOD PRESSURE: 67 MMHG

## 2024-03-06 DIAGNOSIS — C77.2 SECONDARY MALIGNANT NEOPLASM OF INTRA-ABDOMINAL LYMPH NODES: ICD-10-CM

## 2024-03-06 DIAGNOSIS — C34.31 SMALL CELL LUNG CANCER, RIGHT LOWER LOBE: Primary | ICD-10-CM

## 2024-03-06 LAB
ALBUMIN SERPL-MCNC: 4.1 G/DL (ref 3.5–5.2)
ALBUMIN/GLOB SERPL: 1.2 G/DL
ALP SERPL-CCNC: 142 U/L (ref 39–117)
ALT SERPL W P-5'-P-CCNC: 40 U/L (ref 1–33)
ANION GAP SERPL CALCULATED.3IONS-SCNC: 8 MMOL/L (ref 5–15)
AST SERPL-CCNC: 38 U/L (ref 1–32)
BASOPHILS # BLD AUTO: 0.03 10*3/MM3 (ref 0–0.2)
BASOPHILS NFR BLD AUTO: 1 % (ref 0–1.5)
BILIRUB SERPL-MCNC: 0.2 MG/DL (ref 0–1.2)
BUN SERPL-MCNC: 11 MG/DL (ref 8–23)
BUN/CREAT SERPL: 13.6 (ref 7–25)
CALCIUM SPEC-SCNC: 9.7 MG/DL (ref 8.6–10.5)
CHLORIDE SERPL-SCNC: 102 MMOL/L (ref 98–107)
CO2 SERPL-SCNC: 32 MMOL/L (ref 22–29)
CREAT SERPL-MCNC: 0.81 MG/DL (ref 0.57–1)
DEPRECATED RDW RBC AUTO: 44.6 FL (ref 37–54)
EGFRCR SERPLBLD CKD-EPI 2021: 82.2 ML/MIN/1.73
EOSINOPHIL # BLD AUTO: 0.03 10*3/MM3 (ref 0–0.4)
EOSINOPHIL NFR BLD AUTO: 1 % (ref 0.3–6.2)
ERYTHROCYTE [DISTWIDTH] IN BLOOD BY AUTOMATED COUNT: 14.6 % (ref 12.3–15.4)
GLOBULIN UR ELPH-MCNC: 3.3 GM/DL
GLUCOSE SERPL-MCNC: 118 MG/DL (ref 65–99)
HCT VFR BLD AUTO: 37.3 % (ref 34–46.6)
HGB BLD-MCNC: 12.2 G/DL (ref 12–15.9)
IMM GRANULOCYTES # BLD AUTO: 0.01 10*3/MM3 (ref 0–0.05)
IMM GRANULOCYTES NFR BLD AUTO: 0.3 % (ref 0–0.5)
LYMPHOCYTES # BLD AUTO: 1 10*3/MM3 (ref 0.7–3.1)
LYMPHOCYTES NFR BLD AUTO: 33.3 % (ref 19.6–45.3)
MCH RBC QN AUTO: 28.8 PG (ref 26.6–33)
MCHC RBC AUTO-ENTMCNC: 32.7 G/DL (ref 31.5–35.7)
MCV RBC AUTO: 88.2 FL (ref 79–97)
MONOCYTES # BLD AUTO: 0.36 10*3/MM3 (ref 0.1–0.9)
MONOCYTES NFR BLD AUTO: 12 % (ref 5–12)
NEUTROPHILS NFR BLD AUTO: 1.57 10*3/MM3 (ref 1.7–7)
NEUTROPHILS NFR BLD AUTO: 52.4 % (ref 42.7–76)
NRBC BLD AUTO-RTO: 0 /100 WBC (ref 0–0.2)
PLATELET # BLD AUTO: 208 10*3/MM3 (ref 140–450)
PMV BLD AUTO: 9.4 FL (ref 6–12)
POTASSIUM SERPL-SCNC: 4.4 MMOL/L (ref 3.5–5.2)
PROT SERPL-MCNC: 7.4 G/DL (ref 6–8.5)
RBC # BLD AUTO: 4.23 10*6/MM3 (ref 3.77–5.28)
SODIUM SERPL-SCNC: 142 MMOL/L (ref 136–145)
WBC NRBC COR # BLD AUTO: 3 10*3/MM3 (ref 3.4–10.8)

## 2024-03-06 PROCEDURE — 80053 COMPREHEN METABOLIC PANEL: CPT

## 2024-03-06 PROCEDURE — 25810000003 SODIUM CHLORIDE 0.9 % SOLUTION 250 ML FLEX CONT: Performed by: INTERNAL MEDICINE

## 2024-03-06 PROCEDURE — 96367 TX/PROPH/DG ADDL SEQ IV INF: CPT

## 2024-03-06 PROCEDURE — 99214 OFFICE O/P EST MOD 30 MIN: CPT | Performed by: INTERNAL MEDICINE

## 2024-03-06 PROCEDURE — 96375 TX/PRO/DX INJ NEW DRUG ADDON: CPT

## 2024-03-06 PROCEDURE — 96366 THER/PROPH/DIAG IV INF ADDON: CPT

## 2024-03-06 PROCEDURE — 1126F AMNT PAIN NOTED NONE PRSNT: CPT | Performed by: INTERNAL MEDICINE

## 2024-03-06 PROCEDURE — 85025 COMPLETE CBC W/AUTO DIFF WBC: CPT

## 2024-03-06 PROCEDURE — 25810000003 SODIUM CHLORIDE 0.9 % SOLUTION 500 ML FLEX CONT: Performed by: INTERNAL MEDICINE

## 2024-03-06 PROCEDURE — 25010000002 PALONOSETRON 0.25 MG/5ML SOLUTION PREFILLED SYRINGE: Performed by: INTERNAL MEDICINE

## 2024-03-06 PROCEDURE — 25010000002 CARBOPLATIN PER 50 MG: Performed by: INTERNAL MEDICINE

## 2024-03-06 PROCEDURE — 25010000002 DEXAMETHASONE SODIUM PHOSPHATE 20 MG/5ML SOLUTION: Performed by: INTERNAL MEDICINE

## 2024-03-06 PROCEDURE — 25010000002 DURVALUMAB 50 MG/ML SOLUTION 10 ML VIAL: Performed by: INTERNAL MEDICINE

## 2024-03-06 PROCEDURE — 25010000002 FOSAPREPITANT PER 1 MG: Performed by: INTERNAL MEDICINE

## 2024-03-06 PROCEDURE — 96417 CHEMO IV INFUS EACH ADDL SEQ: CPT

## 2024-03-06 PROCEDURE — 25010000002 ETOPOSIDE 100 MG/5ML SOLUTION 5 ML VIAL: Performed by: INTERNAL MEDICINE

## 2024-03-06 PROCEDURE — 96413 CHEMO IV INFUSION 1 HR: CPT

## 2024-03-06 PROCEDURE — 36415 COLL VENOUS BLD VENIPUNCTURE: CPT

## 2024-03-06 RX ORDER — PALONOSETRON 0.05 MG/ML
0.25 INJECTION, SOLUTION INTRAVENOUS ONCE
Status: COMPLETED | OUTPATIENT
Start: 2024-03-06 | End: 2024-03-06

## 2024-03-06 RX ORDER — SODIUM CHLORIDE 9 MG/ML
20 INJECTION, SOLUTION INTRAVENOUS ONCE
OUTPATIENT
Start: 2024-03-27

## 2024-03-06 RX ORDER — SODIUM CHLORIDE 9 MG/ML
20 INJECTION, SOLUTION INTRAVENOUS ONCE
OUTPATIENT
Start: 2024-03-29

## 2024-03-06 RX ORDER — SODIUM CHLORIDE 9 MG/ML
20 INJECTION, SOLUTION INTRAVENOUS ONCE
Status: DISCONTINUED | OUTPATIENT
Start: 2024-03-06 | End: 2024-03-06 | Stop reason: HOSPADM

## 2024-03-06 RX ORDER — PALONOSETRON 0.05 MG/ML
0.25 INJECTION, SOLUTION INTRAVENOUS ONCE
OUTPATIENT
Start: 2024-03-27

## 2024-03-06 RX ORDER — SODIUM CHLORIDE 9 MG/ML
20 INJECTION, SOLUTION INTRAVENOUS ONCE
OUTPATIENT
Start: 2024-03-28

## 2024-03-06 RX ADMIN — SODIUM CHLORIDE 130 MG: 9 INJECTION, SOLUTION INTRAVENOUS at 12:26

## 2024-03-06 RX ADMIN — PALONOSETRON 0.25 MG: 0.25 INJECTION, SOLUTION INTRAVENOUS at 11:26

## 2024-03-06 RX ADMIN — SODIUM CHLORIDE 1500 MG: 9 INJECTION, SOLUTION INTRAVENOUS at 10:12

## 2024-03-06 RX ADMIN — CARBOPLATIN 510 MG: 10 INJECTION, SOLUTION INTRAVENOUS at 11:55

## 2024-03-06 RX ADMIN — FOSAPREPITANT 150 MG: 150 INJECTION, POWDER, LYOPHILIZED, FOR SOLUTION INTRAVENOUS at 11:27

## 2024-03-06 RX ADMIN — DEXAMETHASONE SODIUM PHOSPHATE 12 MG: 4 INJECTION INTRA-ARTICULAR; INTRALESIONAL; INTRAMUSCULAR; INTRAVENOUS; SOFT TISSUE at 11:36

## 2024-03-06 NOTE — PROGRESS NOTES
DATE OF VISIT: 3/6/2024    REASON FOR VISIT: Followup for right lower lobe small cell lung cancer     PROBLEM LIST:  1. Right lower lobe small cell lung cancer T1 N1 M0 stage IIb:  A.  Presented with ataxia and lower extremities weakness  B.  Negative bronchoscopy August 19, 2020 and endobronchial ultrasound September 9, 2020  C.  CT chest done December 7, 2020 revealed 2.7 cm right hilar mass  D.  Whole-body PET scan done January 11, 2020 revealed isolated hypermetabolic activity at the right hilar mass SUV of 12  E. positive serology for AGN A-1 antibody which is 92% predicted for small cell lung cancer, positive N type calcium channel blocker and P/Q type calcium channel blooker.  F.  Started chest radiation February 26, 2021, completed end of March 2021  G.  Repeated PET scan done June 08, 2023 revealed complete resolution of activity in the chest however new portacaval hypermetabolic mass.  Biopsy-proven small cell cancer July 12, 2023  H.  Treated with definitive radiotherapy, completed July 2023.  I.  Progressive disease with worsening para-aortic lymphadenopathy document CT scans in February 1, 2024.  J.  Started chemotherapy with carboplatin etoposide and Imfinzi February 2024, Status post 1 cycle.  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  Hypertension  4.  Hypercholesteremia      HISTORY OF PRESENT ILLNESS: The patient is a very pleasant 63 y.o. female  with past medical history significant for suspected small cell lung cancer of the right lung. The patient never had a positive biopsy however her serology was suggestive of SCLCA after presenting with Lambert-Eaton syndrome. The patient is here today for scheduled follow up visit with treatment cycle number 2.     SUBJECTIVE: Gayatri is here today with her .  She was able to tolerate chemotherapy.  She is complaining of mild fatigue.    Past History:  Medical History: has a past medical history of Arthritis, Disease of thyroid  gland, Heart disease, History of radiation therapy (08/11/2023), Hyperlipidemia, Lambert-Eaton syndrome, Lung cancer (02/2021), Metastatic cancer, Pneumonia, and Requires supplemental oxygen.   Surgical History: has a past surgical history that includes Hysterectomy; Foot surgery; and Knee surgery.   Family History: family history includes Brain cancer in her father; Breast cancer in her sister; Heart disease in her brother, mother, and sister; Kidney disease in her mother.   Social History: reports that she quit smoking about 19 months ago. Her smoking use included cigarettes. She started smoking about 16 years ago. She has a 15 pack-year smoking history. She has never used smokeless tobacco. She reports that she does not drink alcohol and does not use drugs.    (Not in a hospital admission)     Allergies: Erythromycin     Review of Systems   Constitutional:  Positive for fatigue.   Respiratory: Negative.     Cardiovascular: Negative.    Gastrointestinal: Negative.          Current Outpatient Medications:     albuterol sulfate  (90 Base) MCG/ACT inhaler, Inhale 2 puffs Every 4 (Four) Hours As Needed., Disp: , Rfl:     Amifampridine Phosphate (Firdapse) 10 MG tablet, Take 2 tablets by mouth 4 (Four) Times a Day., Disp: , Rfl:     aspirin 81 MG EC tablet, Take 1 tablet by mouth Daily. 7-6-23 last dose, Disp: , Rfl:     atorvastatin (LIPITOR) 20 MG tablet, Take 1 tablet by mouth Every Night., Disp: , Rfl:     budesonide (PULMICORT) 0.5 MG/2ML nebulizer solution, , Disp: , Rfl:     furosemide (LASIX) 40 MG tablet, Take 1 tablet by mouth 2 (Two) Times a Day., Disp: , Rfl:     ipratropium-albuterol (DUO-NEB) 0.5-2.5 mg/3 ml nebulizer, , Disp: , Rfl:     levalbuterol (XOPENEX) 1.25 MG/3ML nebulizer solution, , Disp: , Rfl:     levothyroxine (SYNTHROID, LEVOTHROID) 137 MCG tablet, Take 1 tablet by mouth Daily., Disp: , Rfl:     metoprolol tartrate (LOPRESSOR) 50 MG tablet, Take 1 tablet by mouth 2 (Two) Times a  "Day., Disp: , Rfl:     OLANZapine (zyPREXA) 5 MG tablet, Take 1 tablet by mouth Every Night. Take nightly x 4 starting night of chemotherapy., Disp: 4 tablet, Rfl: 3    ondansetron (ZOFRAN) 8 MG tablet, Take 1 tablet by mouth 3 (Three) Times a Day As Needed for Nausea or Vomiting., Disp: 30 tablet, Rfl: 5    potassium chloride (K-DUR,KLOR-CON) 20 MEQ CR tablet, Take 1 tablet by mouth Daily., Disp: , Rfl:     spironolactone (ALDACTONE) 25 MG tablet, Take 1 tablet by mouth Daily., Disp: , Rfl:     PHYSICAL EXAMINATION:   /67   Pulse 94   Temp 97.3 °F (36.3 °C)   Resp 16   Ht 167.6 cm (66\")   Wt 67.1 kg (148 lb)   SpO2 94%   BMI 23.89 kg/m²    Pain Score    03/06/24 0827   PainSc: 0-No pain          ECOG score: 1            ECOG Performance Status: 2 - Symptomatic, <50% confined to bed  General Appearance:  alert, cooperative, no apparent distress and appears stated age   Neurologic/Psychiatric: A&O x 3, gait steady, appropriate affect, strength 5/5 in all muscle groups   HEENT:  Normocephalic, without obvious abnormality, mucous membranes moist   Neck: Supple, symmetrical, trachea midline, no adenopathy;  No thyromegaly, masses, or tenderness   Lungs:   Clear to auscultation bilaterally; respirations regular, even, and unlabored bilaterally   Heart:  Regular rate and rhythm, no murmurs appreciated   Abdomen:   Soft, non-tender, non-distended, and no organomegaly   Lymph nodes: No cervical, supraclavicular, inguinal or axillary adenopathy noted   Extremities: Normal, atraumatic; no clubbing, cyanosis, or edema    Skin: No rashes, ulcers, or suspicious lesions noted     Infusion on 03/06/2024   Component Date Value Ref Range Status    Glucose 03/06/2024 118 (H)  65 - 99 mg/dL Final    BUN 03/06/2024 11  8 - 23 mg/dL Final    Creatinine 03/06/2024 0.81  0.57 - 1.00 mg/dL Final    Sodium 03/06/2024 142  136 - 145 mmol/L Final    Potassium 03/06/2024 4.4  3.5 - 5.2 mmol/L Final    Specimen hemolyzed.  Result " may be falsely elevated.    Chloride 03/06/2024 102  98 - 107 mmol/L Final    CO2 03/06/2024 32.0 (H)  22.0 - 29.0 mmol/L Final    Calcium 03/06/2024 9.7  8.6 - 10.5 mg/dL Final    Total Protein 03/06/2024 7.4  6.0 - 8.5 g/dL Final    Albumin 03/06/2024 4.1  3.5 - 5.2 g/dL Final    ALT (SGPT) 03/06/2024 40 (H)  1 - 33 U/L Final    Specimen hemolyzed.  Result may  be falsely elevated.    AST (SGOT) 03/06/2024 38 (H)  1 - 32 U/L Final    Specimen hemolyzed.  Result may be falsely elevated.    Alkaline Phosphatase 03/06/2024 142 (H)  39 - 117 U/L Final    Total Bilirubin 03/06/2024 0.2  0.0 - 1.2 mg/dL Final    Globulin 03/06/2024 3.3  gm/dL Final    A/G Ratio 03/06/2024 1.2  g/dL Final    BUN/Creatinine Ratio 03/06/2024 13.6  7.0 - 25.0 Final    Anion Gap 03/06/2024 8.0  5.0 - 15.0 mmol/L Final    eGFR 03/06/2024 82.2  >60.0 mL/min/1.73 Final    WBC 03/06/2024 3.00 (L)  3.40 - 10.80 10*3/mm3 Final    RBC 03/06/2024 4.23  3.77 - 5.28 10*6/mm3 Final    Hemoglobin 03/06/2024 12.2  12.0 - 15.9 g/dL Final    Hematocrit 03/06/2024 37.3  34.0 - 46.6 % Final    MCV 03/06/2024 88.2  79.0 - 97.0 fL Final    MCH 03/06/2024 28.8  26.6 - 33.0 pg Final    MCHC 03/06/2024 32.7  31.5 - 35.7 g/dL Final    RDW 03/06/2024 14.6  12.3 - 15.4 % Final    RDW-SD 03/06/2024 44.6  37.0 - 54.0 fl Final    MPV 03/06/2024 9.4  6.0 - 12.0 fL Final    Platelets 03/06/2024 208  140 - 450 10*3/mm3 Final    Neutrophil % 03/06/2024 52.4  42.7 - 76.0 % Final    Lymphocyte % 03/06/2024 33.3  19.6 - 45.3 % Final    Monocyte % 03/06/2024 12.0  5.0 - 12.0 % Final    Eosinophil % 03/06/2024 1.0  0.3 - 6.2 % Final    Basophil % 03/06/2024 1.0  0.0 - 1.5 % Final    Immature Grans % 03/06/2024 0.3  0.0 - 0.5 % Final    Neutrophils, Absolute 03/06/2024 1.57 (L)  1.70 - 7.00 10*3/mm3 Final    Lymphocytes, Absolute 03/06/2024 1.00  0.70 - 3.10 10*3/mm3 Final    Monocytes, Absolute 03/06/2024 0.36  0.10 - 0.90 10*3/mm3 Final    Eosinophils, Absolute 03/06/2024 0.03   0.00 - 0.40 10*3/mm3 Final    Basophils, Absolute 03/06/2024 0.03  0.00 - 0.20 10*3/mm3 Final    Immature Grans, Absolute 03/06/2024 0.01  0.00 - 0.05 10*3/mm3 Final    nRBC 03/06/2024 0.0  0.0 - 0.2 /100 WBC Final        CT chest for pulmonary embolus    Result Date: 2/21/2024  Narrative: CTA/PE PROTOCOL CHEST CT:         2/21/2024 10:12 AM HISTORY:  Shortness of breath . COMPARISON: September 13, 2023. TECHNIQUE: The patient was injected with  IV contrast . Axial images were obtained through the chest in a CTA/PE protocol. 3 D Reconstruction images were also performed. This study was performed with techniques to keep radiation doses as low as reasonably achievable, (ALARA). Individualized dose reduction techniques using automated exposure control or adjustment of mA and/or kV according to the patient size were employed. FINDINGS: There are no filling defects to suggest pulmonary embolism. The heart is normal size. Trace pericardial effusion noted. Significant resolution of previous right lower lobe consolidation. There is likely some minimal subcarinal and right hilar adenopathy. Emphysematous changes are present. Subtle patchy opacities in the posterior right upper lobe, consolidations right middle lobe, likely infectious or inflammatory. These are new from previous. There is no pleural effusion. No acute osseous changes.    Impression: No evidence of pulmonary embolism. Significant resolution of previous right lower lobe consolidation. Subtle patchy opacities in the posterior right upper lobe, consolidations right middle lobe, likely infectious or inflammatory. These are new from previous. Images reviewed, interpreted, and dictated by Danny Calixto DO    XR Chest 1 View    Result Date: 2/21/2024  Narrative: PORTABLE CHEST.    2/21/2024 8:54 AM HISTORY: Acute precordial chest pain. COMPARISON: September 18, 2023. FINDINGS: The cardiac silhouette is normal in size. The mediastinum is unremarkable. There are  chronic changes. The lungs are clear. There is no pneumothorax.    Impression: No acute cardiopulmonary process and no change from prior. Films reviewed, interpreted, and dictated by Dr. Villalobos. Transcribed by Mariaelena Nguyen PA-C.    MRI Brain With & Without Contrast    Result Date: 2/12/2024  Narrative: MRI BRAIN WITH AND WITHOUT CONTRAST  HISTORY: New headaches. Recurrent small cell lung cancer; C77.2-Secondary and unspecified malignant neoplasm of intra-abdominal lymph nodes; C34.31-Malignant neoplasm of lower lobe, right bronchus or lung.  TECHNIQUE: Multiplanar imaging was performed of the brain with and without Gadolinium infusion.  NOTE: Significant motion artifact is present.  COMPARISON: Outside exam 06/07/2023.  FINDINGS: Diffusion sequences show no signal abnormalities to indicate acute infarct or other process.  Moderate generalized atrophy is present. Brain parenchyma displays normal signal without evidence of mass, hemorrhage or edema. No extra-axial abnormal findings are seen. The ventricles and cisterns appear normal. No abnormal enhancing lesions are identified on the post infusion images.      Impression: No evidence of metastatic brain disease.   This report was signed and finalized on 2/12/2024 4:12 PM by Ezequiel Shah MD.         ASSESSMENT: The patient is a very pleasant 63 y.o. female  with small cell lung cancer      PLAN:    1. Right lower lobe small cell lung cancer TxN2 M1b stage [place]:  A.  I will proceed with chemotherapy and immunotherapy using carboplatin etoposide plus Imfinzi, cycle number 2.  B. Today I discussed with the patient her blood work results from March 6, 2024.  Mild leukopenia white blood cells 3000 absolute refill count of 1570.  CMP slight elevated AST ALT.  C.  I will reduce her chemotherapy dose by 25% given her poor performance status.  D.  I will repeat her scans after cycle #3.  E.  I will consider maintenance Imfinzi after cycle #4.  F. We reviewed the potential  side effects of this regimen including fatigue, vomiting and nausea, hair loss, nephropathy, neuropathy, hearing loss, myelosuppression, and risk of infusion reaction.  G. We discussed potential side effects of immunotherapy including but not limited to immune mediated reactions with thyroiditis, pneumonitis, hepatitis, colitis, rash, and electrolyte abnormalities, fatigue, multiorgan failure, and possibly death.  H.  I will monitor the patient blood work including blood counts kidney function liver function and electrolytes.    2. Lambert-Eaton syndrome:  A. She will continue Firdapse 10 mg 4 times daily as prescribed by neurology.  B.  I am hoping this should get better with chemotherapy.  I do think this should be a contraindication for her immunotherapy since chemotherapy with treating the underlying cause for her neurologic syndrome.    3. Hypothyroid:  A. She will continue Synthroid daily    4.  Poor systolic cardiac function:  A.  Status post defibrillator placement.    5.  Dizziness:  A.  MRI from  done June 7, 2023 negative for metastatic disease.    FOLLOW UP: 3 weeks with cycle #3.    Susi Irving MD  3/6/2024

## 2024-03-07 ENCOUNTER — INFUSION (OUTPATIENT)
Dept: ONCOLOGY | Facility: HOSPITAL | Age: 64
End: 2024-03-07
Payer: MEDICARE

## 2024-03-07 VITALS — DIASTOLIC BLOOD PRESSURE: 50 MMHG | SYSTOLIC BLOOD PRESSURE: 95 MMHG | TEMPERATURE: 98 F | HEART RATE: 83 BPM

## 2024-03-07 DIAGNOSIS — C77.2 SECONDARY MALIGNANT NEOPLASM OF INTRA-ABDOMINAL LYMPH NODES: Primary | ICD-10-CM

## 2024-03-07 DIAGNOSIS — C34.31 SMALL CELL LUNG CANCER, RIGHT LOWER LOBE: ICD-10-CM

## 2024-03-07 PROCEDURE — 25010000002 ETOPOSIDE 100 MG/5ML SOLUTION 5 ML VIAL: Performed by: INTERNAL MEDICINE

## 2024-03-07 PROCEDURE — 25010000002 DEXAMETHASONE SODIUM PHOSPHATE 20 MG/5ML SOLUTION: Performed by: INTERNAL MEDICINE

## 2024-03-07 PROCEDURE — 96409 CHEMO IV PUSH SNGL DRUG: CPT

## 2024-03-07 PROCEDURE — 25810000003 SODIUM CHLORIDE 0.9 % SOLUTION 500 ML FLEX CONT: Performed by: INTERNAL MEDICINE

## 2024-03-07 PROCEDURE — 96413 CHEMO IV INFUSION 1 HR: CPT

## 2024-03-07 PROCEDURE — 96375 TX/PRO/DX INJ NEW DRUG ADDON: CPT

## 2024-03-07 RX ORDER — SODIUM CHLORIDE 9 MG/ML
20 INJECTION, SOLUTION INTRAVENOUS ONCE
Status: DISCONTINUED | OUTPATIENT
Start: 2024-03-07 | End: 2024-03-07 | Stop reason: HOSPADM

## 2024-03-07 RX ADMIN — DEXAMETHASONE SODIUM PHOSPHATE 12 MG: 4 INJECTION, SOLUTION INTRAMUSCULAR; INTRAVENOUS at 12:58

## 2024-03-07 RX ADMIN — SODIUM CHLORIDE 130 MG: 9 INJECTION, SOLUTION INTRAVENOUS at 13:45

## 2024-03-08 ENCOUNTER — INFUSION (OUTPATIENT)
Dept: ONCOLOGY | Facility: HOSPITAL | Age: 64
End: 2024-03-08
Payer: MEDICARE

## 2024-03-08 VITALS
TEMPERATURE: 97.7 F | RESPIRATION RATE: 14 BRPM | BODY MASS INDEX: 24.73 KG/M2 | HEART RATE: 90 BPM | SYSTOLIC BLOOD PRESSURE: 86 MMHG | DIASTOLIC BLOOD PRESSURE: 53 MMHG | WEIGHT: 153.2 LBS | OXYGEN SATURATION: 93 %

## 2024-03-08 DIAGNOSIS — C34.31 SMALL CELL LUNG CANCER, RIGHT LOWER LOBE: ICD-10-CM

## 2024-03-08 DIAGNOSIS — C77.2 SECONDARY MALIGNANT NEOPLASM OF INTRA-ABDOMINAL LYMPH NODES: Primary | ICD-10-CM

## 2024-03-08 PROCEDURE — 25010000002 DEXAMETHASONE SODIUM PHOSPHATE 20 MG/5ML SOLUTION: Performed by: INTERNAL MEDICINE

## 2024-03-08 PROCEDURE — 96375 TX/PRO/DX INJ NEW DRUG ADDON: CPT

## 2024-03-08 PROCEDURE — 96413 CHEMO IV INFUSION 1 HR: CPT

## 2024-03-08 PROCEDURE — 25810000003 SODIUM CHLORIDE 0.9 % SOLUTION 500 ML FLEX CONT: Performed by: INTERNAL MEDICINE

## 2024-03-08 PROCEDURE — 25010000002 ETOPOSIDE 100 MG/5ML SOLUTION 5 ML VIAL: Performed by: INTERNAL MEDICINE

## 2024-03-08 RX ORDER — SODIUM CHLORIDE 9 MG/ML
20 INJECTION, SOLUTION INTRAVENOUS ONCE
Status: DISCONTINUED | OUTPATIENT
Start: 2024-03-08 | End: 2024-03-08 | Stop reason: HOSPADM

## 2024-03-08 RX ADMIN — DEXAMETHASONE SODIUM PHOSPHATE 12 MG: 4 INJECTION, SOLUTION INTRAMUSCULAR; INTRAVENOUS at 14:10

## 2024-03-08 RX ADMIN — SODIUM CHLORIDE 130 MG: 9 INJECTION, SOLUTION INTRAVENOUS at 14:27

## 2024-03-21 ENCOUNTER — TELEPHONE (OUTPATIENT)
Dept: NUTRITION | Facility: HOSPITAL | Age: 64
End: 2024-03-21
Payer: MEDICARE

## 2024-03-21 NOTE — PROGRESS NOTES
Adult Outpatient Nutrition  Assessment    Patient Name:  Gayatri Kahn  YOB: 1960  MRN: [id number]    Assessment Date:  3/21/2024    Comments:  Called pt in regard to nutrition oncology f/up. Pt declined having any wt loss. She is eating well. She does not have any questions/concerns at this time. RD to f/up in 1 month.    Electronically signed by:  Veronika Yusuf RD  03/21/24 13:21 EDT

## 2024-03-27 ENCOUNTER — INFUSION (OUTPATIENT)
Dept: ONCOLOGY | Facility: HOSPITAL | Age: 64
End: 2024-03-27
Payer: MEDICARE

## 2024-03-27 ENCOUNTER — OFFICE VISIT (OUTPATIENT)
Dept: ONCOLOGY | Facility: CLINIC | Age: 64
End: 2024-03-27
Payer: MEDICARE

## 2024-03-27 VITALS
DIASTOLIC BLOOD PRESSURE: 55 MMHG | OXYGEN SATURATION: 97 % | TEMPERATURE: 97.5 F | WEIGHT: 149 LBS | SYSTOLIC BLOOD PRESSURE: 106 MMHG | HEART RATE: 86 BPM | HEIGHT: 66 IN | RESPIRATION RATE: 16 BRPM | BODY MASS INDEX: 23.95 KG/M2

## 2024-03-27 DIAGNOSIS — C34.31 SMALL CELL LUNG CANCER, RIGHT LOWER LOBE: Primary | ICD-10-CM

## 2024-03-27 DIAGNOSIS — C77.2 SECONDARY MALIGNANT NEOPLASM OF INTRA-ABDOMINAL LYMPH NODES: ICD-10-CM

## 2024-03-27 LAB
ALBUMIN SERPL-MCNC: 4.3 G/DL (ref 3.5–5.2)
ALBUMIN/GLOB SERPL: 1.5 G/DL
ALP SERPL-CCNC: 142 U/L (ref 39–117)
ALT SERPL W P-5'-P-CCNC: 38 U/L (ref 1–33)
ANION GAP SERPL CALCULATED.3IONS-SCNC: 13.1 MMOL/L (ref 5–15)
AST SERPL-CCNC: 30 U/L (ref 1–32)
BASOPHILS # BLD AUTO: 0.02 10*3/MM3 (ref 0–0.2)
BASOPHILS NFR BLD AUTO: 0.8 % (ref 0–1.5)
BILIRUB SERPL-MCNC: 0.2 MG/DL (ref 0–1.2)
BUN SERPL-MCNC: 8 MG/DL (ref 8–23)
BUN/CREAT SERPL: 10.7 (ref 7–25)
CALCIUM SPEC-SCNC: 9.3 MG/DL (ref 8.6–10.5)
CHLORIDE SERPL-SCNC: 101 MMOL/L (ref 98–107)
CO2 SERPL-SCNC: 25.9 MMOL/L (ref 22–29)
CREAT SERPL-MCNC: 0.75 MG/DL (ref 0.57–1)
DEPRECATED RDW RBC AUTO: 50.8 FL (ref 37–54)
EGFRCR SERPLBLD CKD-EPI 2021: 90.1 ML/MIN/1.73
EOSINOPHIL # BLD AUTO: 0.04 10*3/MM3 (ref 0–0.4)
EOSINOPHIL NFR BLD AUTO: 1.6 % (ref 0.3–6.2)
ERYTHROCYTE [DISTWIDTH] IN BLOOD BY AUTOMATED COUNT: 17 % (ref 12.3–15.4)
GLOBULIN UR ELPH-MCNC: 2.8 GM/DL
GLUCOSE SERPL-MCNC: 128 MG/DL (ref 65–99)
HCT VFR BLD AUTO: 33 % (ref 34–46.6)
HGB BLD-MCNC: 11 G/DL (ref 12–15.9)
IMM GRANULOCYTES # BLD AUTO: 0.02 10*3/MM3 (ref 0–0.05)
IMM GRANULOCYTES NFR BLD AUTO: 0.8 % (ref 0–0.5)
LYMPHOCYTES # BLD AUTO: 1.04 10*3/MM3 (ref 0.7–3.1)
LYMPHOCYTES NFR BLD AUTO: 41.4 % (ref 19.6–45.3)
MCH RBC QN AUTO: 28.9 PG (ref 26.6–33)
MCHC RBC AUTO-ENTMCNC: 33.3 G/DL (ref 31.5–35.7)
MCV RBC AUTO: 86.8 FL (ref 79–97)
MONOCYTES # BLD AUTO: 0.48 10*3/MM3 (ref 0.1–0.9)
MONOCYTES NFR BLD AUTO: 19.1 % (ref 5–12)
NEUTROPHILS NFR BLD AUTO: 0.91 10*3/MM3 (ref 1.7–7)
NEUTROPHILS NFR BLD AUTO: 36.3 % (ref 42.7–76)
NRBC BLD AUTO-RTO: 0 /100 WBC (ref 0–0.2)
PLATELET # BLD AUTO: 161 10*3/MM3 (ref 140–450)
PMV BLD AUTO: 9.1 FL (ref 6–12)
POTASSIUM SERPL-SCNC: 4 MMOL/L (ref 3.5–5.2)
PROT SERPL-MCNC: 7.1 G/DL (ref 6–8.5)
RBC # BLD AUTO: 3.8 10*6/MM3 (ref 3.77–5.28)
SODIUM SERPL-SCNC: 140 MMOL/L (ref 136–145)
T4 FREE SERPL-MCNC: 1.52 NG/DL (ref 0.93–1.7)
TSH SERPL DL<=0.05 MIU/L-ACNC: 1.05 UIU/ML (ref 0.27–4.2)
WBC NRBC COR # BLD AUTO: 2.51 10*3/MM3 (ref 3.4–10.8)

## 2024-03-27 PROCEDURE — 85025 COMPLETE CBC W/AUTO DIFF WBC: CPT

## 2024-03-27 PROCEDURE — 25810000003 SODIUM CHLORIDE 0.9 % SOLUTION 500 ML FLEX CONT: Performed by: NURSE PRACTITIONER

## 2024-03-27 PROCEDURE — 80053 COMPREHEN METABOLIC PANEL: CPT

## 2024-03-27 PROCEDURE — 96413 CHEMO IV INFUSION 1 HR: CPT

## 2024-03-27 PROCEDURE — 25010000002 CARBOPLATIN PER 50 MG: Performed by: NURSE PRACTITIONER

## 2024-03-27 PROCEDURE — 96367 TX/PROPH/DG ADDL SEQ IV INF: CPT

## 2024-03-27 PROCEDURE — 1160F RVW MEDS BY RX/DR IN RCRD: CPT | Performed by: NURSE PRACTITIONER

## 2024-03-27 PROCEDURE — 25010000002 DURVALUMAB 50 MG/ML SOLUTION 10 ML VIAL: Performed by: INTERNAL MEDICINE

## 2024-03-27 PROCEDURE — 84443 ASSAY THYROID STIM HORMONE: CPT

## 2024-03-27 PROCEDURE — 25010000002 ETOPOSIDE 100 MG/5ML SOLUTION 5 ML VIAL: Performed by: NURSE PRACTITIONER

## 2024-03-27 PROCEDURE — 1126F AMNT PAIN NOTED NONE PRSNT: CPT | Performed by: NURSE PRACTITIONER

## 2024-03-27 PROCEDURE — 25010000002 DEXAMETHASONE SODIUM PHOSPHATE 20 MG/5ML SOLUTION: Performed by: INTERNAL MEDICINE

## 2024-03-27 PROCEDURE — 1159F MED LIST DOCD IN RCRD: CPT | Performed by: NURSE PRACTITIONER

## 2024-03-27 PROCEDURE — 99214 OFFICE O/P EST MOD 30 MIN: CPT | Performed by: NURSE PRACTITIONER

## 2024-03-27 PROCEDURE — 25010000002 FOSAPREPITANT PER 1 MG: Performed by: INTERNAL MEDICINE

## 2024-03-27 PROCEDURE — 36415 COLL VENOUS BLD VENIPUNCTURE: CPT

## 2024-03-27 PROCEDURE — 25810000003 SODIUM CHLORIDE 0.9 % SOLUTION 250 ML FLEX CONT: Performed by: NURSE PRACTITIONER

## 2024-03-27 PROCEDURE — 84439 ASSAY OF FREE THYROXINE: CPT

## 2024-03-27 PROCEDURE — 96375 TX/PRO/DX INJ NEW DRUG ADDON: CPT

## 2024-03-27 PROCEDURE — 25810000003 SODIUM CHLORIDE 0.9 % SOLUTION 250 ML FLEX CONT: Performed by: INTERNAL MEDICINE

## 2024-03-27 PROCEDURE — 25010000002 PALONOSETRON 0.25 MG/5ML SOLUTION PREFILLED SYRINGE: Performed by: INTERNAL MEDICINE

## 2024-03-27 PROCEDURE — 96417 CHEMO IV INFUS EACH ADDL SEQ: CPT

## 2024-03-27 RX ORDER — PALONOSETRON 0.05 MG/ML
0.25 INJECTION, SOLUTION INTRAVENOUS ONCE
Status: COMPLETED | OUTPATIENT
Start: 2024-03-27 | End: 2024-03-27

## 2024-03-27 RX ORDER — SODIUM CHLORIDE 9 MG/ML
20 INJECTION, SOLUTION INTRAVENOUS ONCE
Status: DISCONTINUED | OUTPATIENT
Start: 2024-03-27 | End: 2024-03-27 | Stop reason: HOSPADM

## 2024-03-27 RX ADMIN — DEXAMETHASONE SODIUM PHOSPHATE 12 MG: 4 INJECTION, SOLUTION INTRAMUSCULAR; INTRAVENOUS at 12:38

## 2024-03-27 RX ADMIN — FOSAPREPITANT DIMEGLUMINE 150 MG: 150 INJECTION, POWDER, LYOPHILIZED, FOR SOLUTION INTRAVENOUS at 12:38

## 2024-03-27 RX ADMIN — CARBOPLATIN 410 MG: 10 INJECTION, SOLUTION INTRAVENOUS at 13:33

## 2024-03-27 RX ADMIN — SODIUM CHLORIDE 130 MG: 9 INJECTION, SOLUTION INTRAVENOUS at 14:10

## 2024-03-27 RX ADMIN — PALONOSETRON 0.25 MG: 0.25 INJECTION, SOLUTION INTRAVENOUS at 12:36

## 2024-03-27 RX ADMIN — SODIUM CHLORIDE 1500 MG: 9 INJECTION, SOLUTION INTRAVENOUS at 11:33

## 2024-03-27 NOTE — PROGRESS NOTES
DATE OF VISIT: 3/27/2024    REASON FOR VISIT: Followup for right lower lobe small cell lung cancer     PROBLEM LIST:  1. Right lower lobe small cell lung cancer T1 N1 M0 stage IIb:  A.  Presented with ataxia and lower extremities weakness  B.  Negative bronchoscopy August 19, 2020 and endobronchial ultrasound September 9, 2020  C.  CT chest done December 7, 2020 revealed 2.7 cm right hilar mass  D.  Whole-body PET scan done January 11, 2020 revealed isolated hypermetabolic activity at the right hilar mass SUV of 12  E. positive serology for AGN A-1 antibody which is 92% predicted for small cell lung cancer, positive N type calcium channel blocker and P/Q type calcium channel blooker.  F.  Started chest radiation February 26, 2021, completed end of March 2021  G.  Repeated PET scan done June 08, 2023 revealed complete resolution of activity in the chest however new portacaval hypermetabolic mass.  Biopsy-proven small cell cancer July 12, 2023  H.  Treated with definitive radiotherapy, completed July 2023.  I.  Progressive disease with worsening para-aortic lymphadenopathy document CT scans in February 1, 2024.  J.  Started chemotherapy with carboplatin etoposide and Imfinzi February 2024, Status post 2 cycles.  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  Hypertension  4.  Hypercholesteremia      HISTORY OF PRESENT ILLNESS: The patient is a very pleasant 63 y.o. female  with past medical history significant for suspected small cell lung cancer of the right lung. The patient never had a positive biopsy however her serology was suggestive of SCLCA after presenting with Lambert-Eaton syndrome. The patient is here today for scheduled follow up visit with treatment cycle number 3.     SUBJECTIVE: Gayatri is here today with her .  She has been able to tolerate chemotherapy fairly well.  She continues to complain of mild fatigue that is stable.  She is seeing [facility] for Lambert-Eaton syndrome.   This is caused her to be unable to walk.  She also has some dizziness.  She is currently on a medication she says that helps some.  They are looking into a plasma exchange.  They feel like the cerebellar atrophy is causing her the inability to walk.  They plan for the plasma exchange once chemotherapy is completed.        Past History:  Medical History: has a past medical history of Arthritis, Disease of thyroid gland, Heart disease, History of radiation therapy (08/11/2023), Hyperlipidemia, Lambert-Eaton syndrome, Lung cancer (02/2021), Metastatic cancer, Pneumonia, and Requires supplemental oxygen.   Surgical History: has a past surgical history that includes Hysterectomy; Foot surgery; and Knee surgery.   Family History: family history includes Brain cancer in her father; Breast cancer in her sister; Heart disease in her brother, mother, and sister; Kidney disease in her mother.   Social History: reports that she quit smoking about 19 months ago. Her smoking use included cigarettes. She started smoking about 16 years ago. She has a 15 pack-year smoking history. She has never used smokeless tobacco. She reports that she does not drink alcohol and does not use drugs.    (Not in a hospital admission)     Allergies: Erythromycin     Review of Systems   Constitutional:  Positive for fatigue.   Respiratory: Negative.     Cardiovascular: Negative.    Gastrointestinal: Negative.    Musculoskeletal:  Positive for gait problem.   Neurological:  Positive for dizziness and weakness.         Current Outpatient Medications:     albuterol sulfate  (90 Base) MCG/ACT inhaler, Inhale 2 puffs Every 4 (Four) Hours As Needed., Disp: , Rfl:     Amifampridine Phosphate (Firdapse) 10 MG tablet, Take 2 tablets by mouth 4 (Four) Times a Day., Disp: , Rfl:     aspirin 81 MG EC tablet, Take 1 tablet by mouth Daily. 7-6-23 last dose, Disp: , Rfl:     atorvastatin (LIPITOR) 20 MG tablet, Take 1 tablet by mouth Every Night., Disp: ,  "Rfl:     budesonide (PULMICORT) 0.5 MG/2ML nebulizer solution, , Disp: , Rfl:     furosemide (LASIX) 40 MG tablet, Take 1 tablet by mouth 2 (Two) Times a Day., Disp: , Rfl:     ipratropium-albuterol (DUO-NEB) 0.5-2.5 mg/3 ml nebulizer, , Disp: , Rfl:     levalbuterol (XOPENEX) 1.25 MG/3ML nebulizer solution, , Disp: , Rfl:     levothyroxine (SYNTHROID, LEVOTHROID) 100 MCG tablet, Take 1 tablet by mouth Daily., Disp: , Rfl:     metoprolol tartrate (LOPRESSOR) 50 MG tablet, Take 1 tablet by mouth 2 (Two) Times a Day., Disp: , Rfl:     OLANZapine (zyPREXA) 5 MG tablet, Take 1 tablet by mouth Every Night. Take nightly x 4 starting night of chemotherapy., Disp: 4 tablet, Rfl: 3    ondansetron (ZOFRAN) 8 MG tablet, Take 1 tablet by mouth 3 (Three) Times a Day As Needed for Nausea or Vomiting., Disp: 30 tablet, Rfl: 5    potassium chloride (K-DUR,KLOR-CON) 20 MEQ CR tablet, Take 1 tablet by mouth Daily., Disp: , Rfl:     spironolactone (ALDACTONE) 25 MG tablet, Take 1 tablet by mouth Daily., Disp: , Rfl:     PHYSICAL EXAMINATION:   /55   Pulse 86   Temp 97.5 °F (36.4 °C)   Resp 16   Ht 167.6 cm (66\")   Wt 67.6 kg (149 lb)   SpO2 97%   BMI 24.05 kg/m²    Pain Score    03/27/24 0845   PainSc: 0-No pain          ECOG score: 1            ECOG Performance Status: 2 - Symptomatic, <50% confined to bed  General Appearance:  alert, cooperative, no apparent distress and appears stated age   Neurologic/Psychiatric: A&O x 3, gait steady, appropriate affect, strength 5/5 in all muscle groups   HEENT:  Normocephalic, without obvious abnormality, mucous membranes moist   Neck: Supple, symmetrical, trachea midline, no adenopathy;  No thyromegaly, masses, or tenderness   Lungs:   Clear to auscultation bilaterally; respirations regular, even, and unlabored bilaterally   Heart:  Regular rate and rhythm, no murmurs appreciated   Abdomen:   Soft, non-tender, and non-distended   Lymph nodes: No cervical, supraclavicular, inguinal " or axillary adenopathy noted   Extremities: Normal, atraumatic; no clubbing, cyanosis, or edema    Skin: No rashes, ulcers, or suspicious lesions noted     No visits with results within 2 Week(s) from this visit.   Latest known visit with results is:   Infusion on 03/06/2024   Component Date Value Ref Range Status    Glucose 03/06/2024 118 (H)  65 - 99 mg/dL Final    BUN 03/06/2024 11  8 - 23 mg/dL Final    Creatinine 03/06/2024 0.81  0.57 - 1.00 mg/dL Final    Sodium 03/06/2024 142  136 - 145 mmol/L Final    Potassium 03/06/2024 4.4  3.5 - 5.2 mmol/L Final    Specimen hemolyzed.  Result may be falsely elevated.    Chloride 03/06/2024 102  98 - 107 mmol/L Final    CO2 03/06/2024 32.0 (H)  22.0 - 29.0 mmol/L Final    Calcium 03/06/2024 9.7  8.6 - 10.5 mg/dL Final    Total Protein 03/06/2024 7.4  6.0 - 8.5 g/dL Final    Albumin 03/06/2024 4.1  3.5 - 5.2 g/dL Final    ALT (SGPT) 03/06/2024 40 (H)  1 - 33 U/L Final    Specimen hemolyzed.  Result may  be falsely elevated.    AST (SGOT) 03/06/2024 38 (H)  1 - 32 U/L Final    Specimen hemolyzed.  Result may be falsely elevated.    Alkaline Phosphatase 03/06/2024 142 (H)  39 - 117 U/L Final    Total Bilirubin 03/06/2024 0.2  0.0 - 1.2 mg/dL Final    Globulin 03/06/2024 3.3  gm/dL Final    A/G Ratio 03/06/2024 1.2  g/dL Final    BUN/Creatinine Ratio 03/06/2024 13.6  7.0 - 25.0 Final    Anion Gap 03/06/2024 8.0  5.0 - 15.0 mmol/L Final    eGFR 03/06/2024 82.2  >60.0 mL/min/1.73 Final    WBC 03/06/2024 3.00 (L)  3.40 - 10.80 10*3/mm3 Final    RBC 03/06/2024 4.23  3.77 - 5.28 10*6/mm3 Final    Hemoglobin 03/06/2024 12.2  12.0 - 15.9 g/dL Final    Hematocrit 03/06/2024 37.3  34.0 - 46.6 % Final    MCV 03/06/2024 88.2  79.0 - 97.0 fL Final    MCH 03/06/2024 28.8  26.6 - 33.0 pg Final    MCHC 03/06/2024 32.7  31.5 - 35.7 g/dL Final    RDW 03/06/2024 14.6  12.3 - 15.4 % Final    RDW-SD 03/06/2024 44.6  37.0 - 54.0 fl Final    MPV 03/06/2024 9.4  6.0 - 12.0 fL Final    Platelets  03/06/2024 208  140 - 450 10*3/mm3 Final    Neutrophil % 03/06/2024 52.4  42.7 - 76.0 % Final    Lymphocyte % 03/06/2024 33.3  19.6 - 45.3 % Final    Monocyte % 03/06/2024 12.0  5.0 - 12.0 % Final    Eosinophil % 03/06/2024 1.0  0.3 - 6.2 % Final    Basophil % 03/06/2024 1.0  0.0 - 1.5 % Final    Immature Grans % 03/06/2024 0.3  0.0 - 0.5 % Final    Neutrophils, Absolute 03/06/2024 1.57 (L)  1.70 - 7.00 10*3/mm3 Final    Lymphocytes, Absolute 03/06/2024 1.00  0.70 - 3.10 10*3/mm3 Final    Monocytes, Absolute 03/06/2024 0.36  0.10 - 0.90 10*3/mm3 Final    Eosinophils, Absolute 03/06/2024 0.03  0.00 - 0.40 10*3/mm3 Final    Basophils, Absolute 03/06/2024 0.03  0.00 - 0.20 10*3/mm3 Final    Immature Grans, Absolute 03/06/2024 0.01  0.00 - 0.05 10*3/mm3 Final    nRBC 03/06/2024 0.0  0.0 - 0.2 /100 WBC Final        No results found.      ASSESSMENT: The patient is a very pleasant 63 y.o. female  with small cell lung cancer      PLAN:    1. Right lower lobe small cell lung cancer TxN2 M1b stage [place]:  A.  I will proceed with chemotherapy and immunotherapy using carboplatin etoposide plus Imfinzi, cycle number 3.  B. Today I discussed with the patient her blood work results from March 6, 2024.  Mild leukopenia white blood cells 3000 absolute refill count of 1570.  CMP slight elevated AST ALT.  C.  I will continue reduced dose of chemotherapy dose by 25% given her poor performance status.  D.  I will repeat her scans after cycle #3. I will order prior to return.   E.  I will consider maintenance Imfinzi after cycle #4.  F. We reviewed the potential side effects of this regimen including fatigue, vomiting and nausea, hair loss, nephropathy, neuropathy, hearing loss, myelosuppression, and risk of infusion reaction.  G. We reviewed potential side effects of immunotherapy including but not limited to immune mediated reactions with thyroiditis, pneumonitis, hepatitis, colitis, rash, and electrolyte abnormalities, fatigue,  multiorgan failure, and possibly death.  H.  I will continue to monitor the patient blood work including blood counts kidney function liver function and electrolytes.    2. Lambert-Eaton syndrome:  A. She will continue Firdapse 10 mg 4 times daily as prescribed by neurology.  B.  I am hoping this should get better with chemotherapy.  I do think this should be a contraindication for her immunotherapy since chemotherapy with treating the underlying cause for her neurologic syndrome.  C.  They are considering plasma exchange once chemotherapy has completed.    3. Hypothyroid:  A. She will continue Synthroid daily    4.  Poor systolic cardiac function:  A.  Status post defibrillator placement.    5.  Dizziness:  A.  MRI on 2/12/2024 showed negative for metastatic disease.    FOLLOW UP: 3 weeks with cycle #4 with scans.    Addendum: I discussed the case with Dr. Irving. We will keep dose reduction of etoposide at 75% and decrease carboplatin to AUC 4.     Mary Carmen Hoover, APRN  3/27/2024

## 2024-03-28 ENCOUNTER — PATIENT OUTREACH (OUTPATIENT)
Dept: ONCOLOGY | Facility: HOSPITAL | Age: 64
End: 2024-03-28
Payer: MEDICARE

## 2024-03-28 ENCOUNTER — INFUSION (OUTPATIENT)
Dept: ONCOLOGY | Facility: HOSPITAL | Age: 64
End: 2024-03-28
Payer: MEDICARE

## 2024-03-28 VITALS
TEMPERATURE: 97.8 F | BODY MASS INDEX: 24.47 KG/M2 | HEART RATE: 83 BPM | SYSTOLIC BLOOD PRESSURE: 95 MMHG | DIASTOLIC BLOOD PRESSURE: 54 MMHG | WEIGHT: 151.6 LBS

## 2024-03-28 DIAGNOSIS — C77.2 SECONDARY MALIGNANT NEOPLASM OF INTRA-ABDOMINAL LYMPH NODES: Primary | ICD-10-CM

## 2024-03-28 DIAGNOSIS — C34.31 SMALL CELL LUNG CANCER, RIGHT LOWER LOBE: ICD-10-CM

## 2024-03-28 PROCEDURE — 96413 CHEMO IV INFUSION 1 HR: CPT

## 2024-03-28 PROCEDURE — 25810000003 SODIUM CHLORIDE 0.9 % SOLUTION 500 ML FLEX CONT: Performed by: INTERNAL MEDICINE

## 2024-03-28 PROCEDURE — 25010000002 DEXAMETHASONE SODIUM PHOSPHATE 20 MG/5ML SOLUTION: Performed by: INTERNAL MEDICINE

## 2024-03-28 PROCEDURE — 96375 TX/PRO/DX INJ NEW DRUG ADDON: CPT

## 2024-03-28 PROCEDURE — 25010000002 ETOPOSIDE 500 MG/25ML SOLUTION 25 ML VIAL: Performed by: INTERNAL MEDICINE

## 2024-03-28 RX ORDER — SODIUM CHLORIDE 9 MG/ML
20 INJECTION, SOLUTION INTRAVENOUS ONCE
Status: DISCONTINUED | OUTPATIENT
Start: 2024-03-28 | End: 2024-03-28 | Stop reason: HOSPADM

## 2024-03-28 RX ADMIN — SODIUM CHLORIDE 130 MG: 9 INJECTION, SOLUTION INTRAVENOUS at 13:38

## 2024-03-28 RX ADMIN — DEXAMETHASONE SODIUM PHOSPHATE 12 MG: 4 INJECTION, SOLUTION INTRAMUSCULAR; INTRAVENOUS at 13:18

## 2024-03-29 ENCOUNTER — INFUSION (OUTPATIENT)
Dept: ONCOLOGY | Facility: HOSPITAL | Age: 64
End: 2024-03-29
Payer: MEDICARE

## 2024-03-29 VITALS
WEIGHT: 153.6 LBS | HEART RATE: 75 BPM | OXYGEN SATURATION: 98 % | SYSTOLIC BLOOD PRESSURE: 97 MMHG | TEMPERATURE: 98 F | DIASTOLIC BLOOD PRESSURE: 54 MMHG | BODY MASS INDEX: 24.79 KG/M2 | RESPIRATION RATE: 14 BRPM

## 2024-03-29 DIAGNOSIS — C77.2 SECONDARY MALIGNANT NEOPLASM OF INTRA-ABDOMINAL LYMPH NODES: Primary | ICD-10-CM

## 2024-03-29 DIAGNOSIS — C34.31 SMALL CELL LUNG CANCER, RIGHT LOWER LOBE: ICD-10-CM

## 2024-03-29 PROCEDURE — 25010000002 DEXAMETHASONE SODIUM PHOSPHATE 20 MG/5ML SOLUTION: Performed by: INTERNAL MEDICINE

## 2024-03-29 PROCEDURE — 96375 TX/PRO/DX INJ NEW DRUG ADDON: CPT

## 2024-03-29 PROCEDURE — 25010000002 ETOPOSIDE 500 MG/25ML SOLUTION 25 ML VIAL: Performed by: INTERNAL MEDICINE

## 2024-03-29 PROCEDURE — 96413 CHEMO IV INFUSION 1 HR: CPT

## 2024-03-29 PROCEDURE — 25810000003 SODIUM CHLORIDE 0.9 % SOLUTION 500 ML FLEX CONT: Performed by: INTERNAL MEDICINE

## 2024-03-29 RX ORDER — SODIUM CHLORIDE 9 MG/ML
20 INJECTION, SOLUTION INTRAVENOUS ONCE
Status: DISCONTINUED | OUTPATIENT
Start: 2024-03-29 | End: 2024-03-29 | Stop reason: HOSPADM

## 2024-03-29 RX ADMIN — SODIUM CHLORIDE 130 MG: 9 INJECTION, SOLUTION INTRAVENOUS at 13:55

## 2024-03-29 RX ADMIN — DEXAMETHASONE SODIUM PHOSPHATE 12 MG: 4 INJECTION, SOLUTION INTRAMUSCULAR; INTRAVENOUS at 13:36

## 2024-04-15 ENCOUNTER — HOSPITAL ENCOUNTER (OUTPATIENT)
Dept: CT IMAGING | Facility: HOSPITAL | Age: 64
Discharge: HOME | End: 2024-04-15
Admitting: NURSE PRACTITIONER
Payer: MEDICARE

## 2024-04-15 DIAGNOSIS — C34.31 SMALL CELL LUNG CANCER, RIGHT LOWER LOBE: ICD-10-CM

## 2024-04-15 DIAGNOSIS — C77.2 SECONDARY MALIGNANT NEOPLASM OF INTRA-ABDOMINAL LYMPH NODES: ICD-10-CM

## 2024-04-15 PROCEDURE — 25510000001 IOPAMIDOL 61 % SOLUTION: Performed by: NURSE PRACTITIONER

## 2024-04-15 PROCEDURE — 74177 CT ABD & PELVIS W/CONTRAST: CPT

## 2024-04-15 PROCEDURE — 70491 CT SOFT TISSUE NECK W/DYE: CPT

## 2024-04-15 PROCEDURE — 71260 CT THORAX DX C+: CPT

## 2024-04-15 RX ADMIN — IOPAMIDOL 95 ML: 612 INJECTION, SOLUTION INTRAVENOUS at 14:12

## 2024-04-16 NOTE — PROGRESS NOTES
DATE OF VISIT: 4/17/2024    REASON FOR VISIT: Followup for right lower lobe small cell lung cancer     PROBLEM LIST:  1. Right lower lobe small cell lung cancer T1 N1 M0 stage IIb:  A.  Presented with ataxia and lower extremities weakness  B.  Negative bronchoscopy August 19, 2020 and endobronchial ultrasound September 9, 2020  C.  CT chest done December 7, 2020 revealed 2.7 cm right hilar mass  D.  Whole-body PET scan done January 11, 2020 revealed isolated hypermetabolic activity at the right hilar mass SUV of 12  E. positive serology for AGN A-1 antibody which is 92% predicted for small cell lung cancer, positive N type calcium channel blocker and P/Q type calcium channel blooker.  F.  Started chest radiation February 26, 2021, completed end of March 2021  G.  Repeated PET scan done June 08, 2023 revealed complete resolution of activity in the chest however new portacaval hypermetabolic mass.  Biopsy-proven small cell cancer July 12, 2023  H.  Treated with definitive radiotherapy, completed July 2023.  I.  Progressive disease with worsening para-aortic lymphadenopathy document CT scans in February 1, 2024.  J.  Started chemotherapy with carboplatin etoposide and Imfinzi February 2024, Status post 3 cycles.  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  Hypertension  4.  Hypercholesteremia      HISTORY OF PRESENT ILLNESS: The patient is a very pleasant 63 y.o. female  with past medical history significant for suspected small cell lung cancer of the right lung. The patient never had a positive biopsy however her serology was suggestive of SCLCA after presenting with Lambert-Eaton syndrome. The patient is here today for scheduled follow up visit with treatment cycle number 4.     SUBJECTIVE: Gayatri is here today with her .  Her dizziness slightly better.  She is anxious about the scan result.  She has been able to tolerate treatment with multiple side effects but she does have nausea that  improved with Zofran.  She continues to have fatigue.      Past History:  Medical History: has a past medical history of Arthritis, Disease of thyroid gland, Heart disease, History of radiation therapy (08/11/2023), Hyperlipidemia, Lambert-Eaton syndrome, Lung cancer (02/2021), Metastatic cancer, Pneumonia, and Requires supplemental oxygen.   Surgical History: has a past surgical history that includes Hysterectomy; Foot surgery; and Knee surgery.   Family History: family history includes Brain cancer in her father; Breast cancer in her sister; Heart disease in her brother, mother, and sister; Kidney disease in her mother.   Social History: reports that she quit smoking about 20 months ago. Her smoking use included cigarettes. She started smoking about 16 years ago. She has a 15 pack-year smoking history. She has never used smokeless tobacco. She reports that she does not drink alcohol and does not use drugs.    (Not in a hospital admission)     Allergies: Erythromycin     Review of Systems   Constitutional:  Positive for fatigue.   Respiratory: Negative.     Cardiovascular: Negative.    Gastrointestinal: Negative.    Musculoskeletal:  Positive for gait problem.   Neurological:  Positive for dizziness and weakness.         Current Outpatient Medications:     albuterol sulfate  (90 Base) MCG/ACT inhaler, Inhale 2 puffs Every 4 (Four) Hours As Needed., Disp: , Rfl:     Amifampridine Phosphate (Firdapse) 10 MG tablet, Take 2 tablets by mouth 4 (Four) Times a Day., Disp: , Rfl:     aspirin 81 MG EC tablet, Take 1 tablet by mouth Daily. 7-6-23 last dose, Disp: , Rfl:     atorvastatin (LIPITOR) 20 MG tablet, Take 1 tablet by mouth Every Night., Disp: , Rfl:     budesonide (PULMICORT) 0.5 MG/2ML nebulizer solution, , Disp: , Rfl:     furosemide (LASIX) 40 MG tablet, Take 1 tablet by mouth 2 (Two) Times a Day., Disp: , Rfl:     ipratropium-albuterol (DUO-NEB) 0.5-2.5 mg/3 ml nebulizer, , Disp: , Rfl:     levalbuterol  "(XOPENEX) 1.25 MG/3ML nebulizer solution, , Disp: , Rfl:     levothyroxine (SYNTHROID, LEVOTHROID) 100 MCG tablet, Take 1 tablet by mouth Daily., Disp: , Rfl:     metoprolol tartrate (LOPRESSOR) 50 MG tablet, Take 1 tablet by mouth 2 (Two) Times a Day., Disp: , Rfl:     OLANZapine (zyPREXA) 5 MG tablet, Take 1 tablet by mouth Every Night. Take nightly x 4 starting night of chemotherapy., Disp: 4 tablet, Rfl: 3    ondansetron (ZOFRAN) 8 MG tablet, Take 1 tablet by mouth 3 (Three) Times a Day As Needed for Nausea or Vomiting., Disp: 30 tablet, Rfl: 5    potassium chloride (K-DUR,KLOR-CON) 20 MEQ CR tablet, Take 1 tablet by mouth Daily., Disp: , Rfl:     spironolactone (ALDACTONE) 25 MG tablet, Take 1 tablet by mouth Daily., Disp: , Rfl:     PHYSICAL EXAMINATION:   /57   Pulse 91   Temp 97.5 °F (36.4 °C)   Resp 16   Ht 167.6 cm (66\")   Wt 66.2 kg (146 lb)   SpO2 96%   BMI 23.57 kg/m²    Pain Score    04/17/24 0931   PainSc: 0-No pain            ECOG score: 1            ECOG Performance Status: 2 - Symptomatic, <50% confined to bed  General Appearance:  alert, cooperative, no apparent distress and appears stated age   Neurologic/Psychiatric: A&O x 3, gait steady, appropriate affect, strength 5/5 in all muscle groups   HEENT:  Normocephalic, without obvious abnormality, mucous membranes moist   Neck: Supple, symmetrical, trachea midline, no adenopathy;  No thyromegaly, masses, or tenderness   Lungs:   Clear to auscultation bilaterally; respirations regular, even, and unlabored bilaterally   Heart:  Regular rate and rhythm, no murmurs appreciated   Abdomen:   Soft, non-tender, and non-distended   Lymph nodes: No cervical, supraclavicular, inguinal or axillary adenopathy noted   Extremities: Normal, atraumatic; no clubbing, cyanosis, or edema    Skin: No rashes, ulcers, or suspicious lesions noted     No visits with results within 2 Week(s) from this visit.   Latest known visit with results is:   Infusion on " 03/27/2024   Component Date Value Ref Range Status    Glucose 03/27/2024 128 (H)  65 - 99 mg/dL Final    BUN 03/27/2024 8  8 - 23 mg/dL Final    Creatinine 03/27/2024 0.75  0.57 - 1.00 mg/dL Final    Sodium 03/27/2024 140  136 - 145 mmol/L Final    Potassium 03/27/2024 4.0  3.5 - 5.2 mmol/L Final    Chloride 03/27/2024 101  98 - 107 mmol/L Final    CO2 03/27/2024 25.9  22.0 - 29.0 mmol/L Final    Calcium 03/27/2024 9.3  8.6 - 10.5 mg/dL Final    Total Protein 03/27/2024 7.1  6.0 - 8.5 g/dL Final    Albumin 03/27/2024 4.3  3.5 - 5.2 g/dL Final    ALT (SGPT) 03/27/2024 38 (H)  1 - 33 U/L Final    AST (SGOT) 03/27/2024 30  1 - 32 U/L Final    Alkaline Phosphatase 03/27/2024 142 (H)  39 - 117 U/L Final    Total Bilirubin 03/27/2024 0.2  0.0 - 1.2 mg/dL Final    Globulin 03/27/2024 2.8  gm/dL Final    A/G Ratio 03/27/2024 1.5  g/dL Final    BUN/Creatinine Ratio 03/27/2024 10.7  7.0 - 25.0 Final    Anion Gap 03/27/2024 13.1  5.0 - 15.0 mmol/L Final    eGFR 03/27/2024 90.1  >60.0 mL/min/1.73 Final    TSH 03/27/2024 1.050  0.270 - 4.200 uIU/mL Final    Free T4 03/27/2024 1.52  0.93 - 1.70 ng/dL Final    WBC 03/27/2024 2.51 (L)  3.40 - 10.80 10*3/mm3 Final    RBC 03/27/2024 3.80  3.77 - 5.28 10*6/mm3 Final    Hemoglobin 03/27/2024 11.0 (L)  12.0 - 15.9 g/dL Final    Hematocrit 03/27/2024 33.0 (L)  34.0 - 46.6 % Final    MCV 03/27/2024 86.8  79.0 - 97.0 fL Final    MCH 03/27/2024 28.9  26.6 - 33.0 pg Final    MCHC 03/27/2024 33.3  31.5 - 35.7 g/dL Final    RDW 03/27/2024 17.0 (H)  12.3 - 15.4 % Final    RDW-SD 03/27/2024 50.8  37.0 - 54.0 fl Final    MPV 03/27/2024 9.1  6.0 - 12.0 fL Final    Platelets 03/27/2024 161  140 - 450 10*3/mm3 Final    Neutrophil % 03/27/2024 36.3 (L)  42.7 - 76.0 % Final    Lymphocyte % 03/27/2024 41.4  19.6 - 45.3 % Final    Monocyte % 03/27/2024 19.1 (H)  5.0 - 12.0 % Final    Eosinophil % 03/27/2024 1.6  0.3 - 6.2 % Final    Basophil % 03/27/2024 0.8  0.0 - 1.5 % Final    Immature Grans %  03/27/2024 0.8 (H)  0.0 - 0.5 % Final    Neutrophils, Absolute 03/27/2024 0.91 (L)  1.70 - 7.00 10*3/mm3 Final    Lymphocytes, Absolute 03/27/2024 1.04  0.70 - 3.10 10*3/mm3 Final    Monocytes, Absolute 03/27/2024 0.48  0.10 - 0.90 10*3/mm3 Final    Eosinophils, Absolute 03/27/2024 0.04  0.00 - 0.40 10*3/mm3 Final    Basophils, Absolute 03/27/2024 0.02  0.00 - 0.20 10*3/mm3 Final    Immature Grans, Absolute 03/27/2024 0.02  0.00 - 0.05 10*3/mm3 Final    nRBC 03/27/2024 0.0  0.0 - 0.2 /100 WBC Final        CT Chest With Contrast Diagnostic    Result Date: 4/16/2024  Narrative: CT CHEST W CONTRAST DIAGNOSTIC, CT ABDOMEN PELVIS W CONTRAST Date of Exam: 4/15/2024 1:48 PM EDT Indication: Follow up . Right lower lobe small cell lung cancer. Comparison: 2/1/2024 chest abdomen pelvis CT scans Technique: Axial CT images were obtained of the chest, abdomen and pelvis after the uneventful intravenous administration of 95 and mL Isovue-300.  Reconstructed coronal and sagittal images were also obtained. Automated exposure control and iterative construction methods were used. Findings: Prior studies from 2/1/2024 by report showed no evidence of pulmonary embolus, and significant aortic adenopathy. CT SCAN OF THE CHEST WITH IV CONTRAST: Extensive changes of centrilobular emphysema are again noted. Small area of pleural-based groundglass disease in the posterior right lower lobe, image 30 appears minimally increased from approximately 8 x 7.5 mm to 9 x 7.5 mm, which may reflect different CT scan slice selection rather than actual enlargement. Trace linear scarring and a few ill-defined micronodules elsewhere in the lungs appear unchanged. Right middle lobe triangular nodule image 42 is consistent with an intrapulmonary lymph node and also unchanged. There is no pleural effusion. Images of the mediastinum show stable residual, somewhat amorphous scarring in the right hilum without a well-defined mass, and no evidence of new or  increasing mediastinal or hilar adenopathy. No pericardial or pleural effusion is seen. The thoracic aorta and pulmonary arteries appear unremarkable. Bony structures appear intact.     Impression: 1. Residual right hilar scarring, and a few scattered pulmonary parenchymal micronodules, unchanged from prior exam. 2. Minimal, if any increase in size of pleural-based posterior right lower lobe groundglass nodule since 2/1/2024 exam. This may be due to technical factors. 3. No clearly new chest disease is identified. ABDOMEN PELVIS CT SCAN WITH IV CONTRAST: There is diffuse fatty liver change. Liver morphology appears normal. No hepatic lesions are identified. Gallbladder, pancreas, right adrenal gland, spleen, and kidneys appear stable. A small left adrenal adrenal nodule measures smaller than on the prior study, previously 16 mm, today 13 mm, whether incidental adenoma or less likely treated metastasis. This was distinctly non-hypermetabolic on 7/8/2022 PET scan. No upper abdominal adenopathy, ascites, inflammatory change, or peritoneal disease is identified. Large and small bowel loops are normal in caliber and appearance. Regarding the lower abdomen and pelvis, terminal ileum, cecum and appendix appear normal. Uterus and ovaries are not identified, either atrophic or surgically absent. Bladder is normally distended and normal in appearance. No intrapelvic mass or inflammatory change is seen. Delayed venous phase images show no evidence of obstructive uropathy. Bony structures appear to be intact. Incidental note is made of moderately advanced L2-3 degenerative disc disease. Impression: No evidence of malignancy/metastasis or other acute disease in the abdomen or pelvis. Electronically Signed: Alber Toro MD  4/16/2024 10:12 AM EDT  Workstation ID: [id number]    CT Abdomen Pelvis With Contrast    Result Date: 4/16/2024  Narrative: CT CHEST W CONTRAST DIAGNOSTIC, CT ABDOMEN PELVIS W CONTRAST Date of Exam: 4/15/2024 1:48  PM EDT Indication: Follow up . Right lower lobe small cell lung cancer. Comparison: 2/1/2024 chest abdomen pelvis CT scans Technique: Axial CT images were obtained of the chest, abdomen and pelvis after the uneventful intravenous administration of 95 and mL Isovue-300.  Reconstructed coronal and sagittal images were also obtained. Automated exposure control and iterative construction methods were used. Findings: Prior studies from 2/1/2024 by report showed no evidence of pulmonary embolus, and significant aortic adenopathy. CT SCAN OF THE CHEST WITH IV CONTRAST: Extensive changes of centrilobular emphysema are again noted. Small area of pleural-based groundglass disease in the posterior right lower lobe, image 30 appears minimally increased from approximately 8 x 7.5 mm to 9 x 7.5 mm, which may reflect different CT scan slice selection rather than actual enlargement. Trace linear scarring and a few ill-defined micronodules elsewhere in the lungs appear unchanged. Right middle lobe triangular nodule image 42 is consistent with an intrapulmonary lymph node and also unchanged. There is no pleural effusion. Images of the mediastinum show stable residual, somewhat amorphous scarring in the right hilum without a well-defined mass, and no evidence of new or increasing mediastinal or hilar adenopathy. No pericardial or pleural effusion is seen. The thoracic aorta and pulmonary arteries appear unremarkable. Bony structures appear intact.     Impression: 1. Residual right hilar scarring, and a few scattered pulmonary parenchymal micronodules, unchanged from prior exam. 2. Minimal, if any increase in size of pleural-based posterior right lower lobe groundglass nodule since 2/1/2024 exam. This may be due to technical factors. 3. No clearly new chest disease is identified. ABDOMEN PELVIS CT SCAN WITH IV CONTRAST: There is diffuse fatty liver change. Liver morphology appears normal. No hepatic lesions are identified. Gallbladder,  pancreas, right adrenal gland, spleen, and kidneys appear stable. A small left adrenal adrenal nodule measures smaller than on the prior study, previously 16 mm, today 13 mm, whether incidental adenoma or less likely treated metastasis. This was distinctly non-hypermetabolic on 7/8/2022 PET scan. No upper abdominal adenopathy, ascites, inflammatory change, or peritoneal disease is identified. Large and small bowel loops are normal in caliber and appearance. Regarding the lower abdomen and pelvis, terminal ileum, cecum and appendix appear normal. Uterus and ovaries are not identified, either atrophic or surgically absent. Bladder is normally distended and normal in appearance. No intrapelvic mass or inflammatory change is seen. Delayed venous phase images show no evidence of obstructive uropathy. Bony structures appear to be intact. Incidental note is made of moderately advanced L2-3 degenerative disc disease. Impression: No evidence of malignancy/metastasis or other acute disease in the abdomen or pelvis. Electronically Signed: Alber Toro MD  4/16/2024 10:12 AM EDT  Workstation ID: [id number]    CT Soft Tissue Neck With Contrast    Result Date: 4/15/2024  Narrative: CT SOFT TISSUE NECK W CONTRAST Date of Exam: 4/15/2024 1:48 PM EDT Indication: Follow up metastatic lung cancer. Comparison: None available. Technique: Axial CT images were obtained of the neck after the uneventful intravenous administration of 95 mL Isovue-300.  Reconstructed coronal and sagittal images were also obtained. Automated exposure control and iterative construction methods were used. Findings: Limited intracranial evaluation demonstrates no acute findings. The orbits appear normal and the paranasal sinuses are clear. There is no evidence of aerodigestive tract mass or other focal luminal abnormality. The parotid and submandibular glands appear  normal. No distinct pathologic cervical lymphadenopathy is present. There is no significant  atherosclerotic narrowing of the ICA origins, 0% stenosis by NASCET criteria. The osseous structures demonstrate no evidence of acute fracture or aggressive osseous lesion. Multilevel spondylosis change is present.     Impression: Impression: No evidence of metastatic disease. Electronically Signed: Ranjith Zarate MD  4/15/2024 2:32 PM EDT  Workstation ID: [id number]       ASSESSMENT: The patient is a very pleasant 63 y.o. female  with small cell lung cancer      PLAN:    1. Right lower lobe small cell lung cancer TxN2 M1b stage [place]:  A.  I will proceed with chemotherapy and immunotherapy using carboplatin etoposide plus Imfinzi, cycle number 4.  B.  I will continue reduced dose of chemotherapy dose by 25% given her poor performance status.  C.  I did go over the scan results with the patient from April 15, 2024 and reassured her it did reveal resolution of her abdominal lymphadenopathy.  I will do 3 months follow-up study which will be due mid July 2024.  D.  I did go over the blood work results with the patient from March 27, 2024, leukopenia WBCs 2.5, Hgb down to 11. Normal platelets.  Essentially normal CMP.  E.  I will consider maintenance Imfinzi after cycle #4.  F. We reviewed the potential side effects of this regimen including fatigue, vomiting and nausea, hair loss, nephropathy, neuropathy, hearing loss, myelosuppression, and risk of infusion reaction.  G. We reviewed potential side effects of immunotherapy including but not limited to immune mediated reactions with thyroiditis, pneumonitis, hepatitis, colitis, rash, and electrolyte abnormalities, fatigue, multiorgan failure, and possibly death.  H.  I will continue to monitor the patient blood work including blood counts kidney function liver function and electrolytes.    2. Lambert-Eaton syndrome:  A. She will continue Firdapse 10 mg 4 times daily as prescribed by neurology.  B.  I am hoping this should get better with chemotherapy.  I do think this should  be a contraindication for her immunotherapy since chemotherapy with treating the underlying cause for her neurologic syndrome.  C.  They are considering plasma exchange once chemotherapy has completed.    3. Hypothyroid:  A. She will continue Synthroid daily    4.  Poor systolic cardiac function:  A.  Status post defibrillator placement.    5.  Dizziness:  A.  MRI on 2/12/2024 showed negative for metastatic disease.    6.  Chemotherapy-induced nausea:  A.  I will continue Zofran as needed.    FOLLOW UP: 3 weeks with cycle #4 with scans.    Total time of patient care including preparation of patient chart prior to arrival, face-to-face with patient and following visit spent in reviewing records, lab results, imaging studies, discussion with patient, and documentation/charting was 40 minutes           Susi Irving MD  4/17/2024

## 2024-04-17 ENCOUNTER — INFUSION (OUTPATIENT)
Dept: ONCOLOGY | Facility: HOSPITAL | Age: 64
End: 2024-04-17
Payer: MEDICARE

## 2024-04-17 ENCOUNTER — OFFICE VISIT (OUTPATIENT)
Dept: ONCOLOGY | Facility: CLINIC | Age: 64
End: 2024-04-17
Payer: MEDICARE

## 2024-04-17 VITALS
SYSTOLIC BLOOD PRESSURE: 100 MMHG | DIASTOLIC BLOOD PRESSURE: 57 MMHG | TEMPERATURE: 97.5 F | RESPIRATION RATE: 16 BRPM | HEIGHT: 66 IN | BODY MASS INDEX: 23.46 KG/M2 | OXYGEN SATURATION: 96 % | WEIGHT: 146 LBS | HEART RATE: 91 BPM

## 2024-04-17 DIAGNOSIS — C77.2 SECONDARY MALIGNANT NEOPLASM OF INTRA-ABDOMINAL LYMPH NODES: ICD-10-CM

## 2024-04-17 DIAGNOSIS — C34.31 SMALL CELL LUNG CANCER, RIGHT LOWER LOBE: Primary | ICD-10-CM

## 2024-04-17 LAB
ALBUMIN SERPL-MCNC: 4.4 G/DL (ref 3.5–5.2)
ALBUMIN/GLOB SERPL: 1.5 G/DL
ALP SERPL-CCNC: 140 U/L (ref 39–117)
ALT SERPL W P-5'-P-CCNC: 35 U/L (ref 1–33)
ANION GAP SERPL CALCULATED.3IONS-SCNC: 10 MMOL/L (ref 5–15)
ANISOCYTOSIS BLD QL: NORMAL
AST SERPL-CCNC: 33 U/L (ref 1–32)
BASOPHILS # BLD AUTO: 0.04 10*3/MM3 (ref 0–0.2)
BASOPHILS NFR BLD AUTO: 0.8 % (ref 0–1.5)
BILIRUB SERPL-MCNC: 0.2 MG/DL (ref 0–1.2)
BUN SERPL-MCNC: 10 MG/DL (ref 8–23)
BUN/CREAT SERPL: 10.8 (ref 7–25)
CALCIUM SPEC-SCNC: 9.7 MG/DL (ref 8.6–10.5)
CHLORIDE SERPL-SCNC: 101 MMOL/L (ref 98–107)
CO2 SERPL-SCNC: 27 MMOL/L (ref 22–29)
CREAT SERPL-MCNC: 0.93 MG/DL (ref 0.57–1)
DACRYOCYTES BLD QL SMEAR: NORMAL
DEPRECATED RDW RBC AUTO: 62.4 FL (ref 37–54)
EGFRCR SERPLBLD CKD-EPI 2021: 69.6 ML/MIN/1.73
ELLIPTOCYTES BLD QL SMEAR: NORMAL
EOSINOPHIL # BLD AUTO: 0.08 10*3/MM3 (ref 0–0.4)
EOSINOPHIL NFR BLD AUTO: 1.6 % (ref 0.3–6.2)
ERYTHROCYTE [DISTWIDTH] IN BLOOD BY AUTOMATED COUNT: 20.1 % (ref 12.3–15.4)
GLOBULIN UR ELPH-MCNC: 2.9 GM/DL
GLUCOSE SERPL-MCNC: 112 MG/DL (ref 65–99)
HCT VFR BLD AUTO: 33.3 % (ref 34–46.6)
HGB BLD-MCNC: 11.3 G/DL (ref 12–15.9)
IMM GRANULOCYTES # BLD AUTO: 0.03 10*3/MM3 (ref 0–0.05)
IMM GRANULOCYTES NFR BLD AUTO: 0.6 % (ref 0–0.5)
LYMPHOCYTES # BLD AUTO: 1.19 10*3/MM3 (ref 0.7–3.1)
LYMPHOCYTES NFR BLD AUTO: 24.4 % (ref 19.6–45.3)
MCH RBC QN AUTO: 29.9 PG (ref 26.6–33)
MCHC RBC AUTO-ENTMCNC: 33.9 G/DL (ref 31.5–35.7)
MCV RBC AUTO: 88.1 FL (ref 79–97)
MONOCYTES # BLD AUTO: 0.7 10*3/MM3 (ref 0.1–0.9)
MONOCYTES NFR BLD AUTO: 14.4 % (ref 5–12)
NEUTROPHILS NFR BLD AUTO: 2.83 10*3/MM3 (ref 1.7–7)
NEUTROPHILS NFR BLD AUTO: 58.2 % (ref 42.7–76)
NRBC BLD AUTO-RTO: 0 /100 WBC (ref 0–0.2)
PLATELET # BLD AUTO: 227 10*3/MM3 (ref 140–450)
PMV BLD AUTO: 9.5 FL (ref 6–12)
POIKILOCYTOSIS BLD QL SMEAR: NORMAL
POTASSIUM SERPL-SCNC: 3.6 MMOL/L (ref 3.5–5.2)
PROT SERPL-MCNC: 7.3 G/DL (ref 6–8.5)
RBC # BLD AUTO: 3.78 10*6/MM3 (ref 3.77–5.28)
SMALL PLATELETS BLD QL SMEAR: ADEQUATE
SODIUM SERPL-SCNC: 138 MMOL/L (ref 136–145)
WBC MORPH BLD: NORMAL
WBC NRBC COR # BLD AUTO: 4.87 10*3/MM3 (ref 3.4–10.8)

## 2024-04-17 PROCEDURE — 25010000002 CARBOPLATIN PER 50 MG: Performed by: INTERNAL MEDICINE

## 2024-04-17 PROCEDURE — 85025 COMPLETE CBC W/AUTO DIFF WBC: CPT

## 2024-04-17 PROCEDURE — 25810000003 SODIUM CHLORIDE 0.9 % SOLUTION 250 ML FLEX CONT: Performed by: INTERNAL MEDICINE

## 2024-04-17 PROCEDURE — 25010000002 FOSAPREPITANT PER 1 MG: Performed by: INTERNAL MEDICINE

## 2024-04-17 PROCEDURE — 96367 TX/PROPH/DG ADDL SEQ IV INF: CPT

## 2024-04-17 PROCEDURE — 25010000002 DEXAMETHASONE SODIUM PHOSPHATE 20 MG/5ML SOLUTION: Performed by: INTERNAL MEDICINE

## 2024-04-17 PROCEDURE — 85007 BL SMEAR W/DIFF WBC COUNT: CPT

## 2024-04-17 PROCEDURE — 25810000003 SODIUM CHLORIDE 0.9 % SOLUTION 500 ML FLEX CONT: Performed by: INTERNAL MEDICINE

## 2024-04-17 PROCEDURE — 25010000002 ETOPOSIDE 500 MG/25ML SOLUTION 25 ML VIAL: Performed by: INTERNAL MEDICINE

## 2024-04-17 PROCEDURE — 96375 TX/PRO/DX INJ NEW DRUG ADDON: CPT

## 2024-04-17 PROCEDURE — 25010000002 DURVALUMAB 50 MG/ML SOLUTION 10 ML VIAL: Performed by: INTERNAL MEDICINE

## 2024-04-17 PROCEDURE — 96417 CHEMO IV INFUS EACH ADDL SEQ: CPT

## 2024-04-17 PROCEDURE — 36415 COLL VENOUS BLD VENIPUNCTURE: CPT

## 2024-04-17 PROCEDURE — 25010000002 PALONOSETRON 0.25 MG/5ML SOLUTION PREFILLED SYRINGE: Performed by: INTERNAL MEDICINE

## 2024-04-17 PROCEDURE — 80053 COMPREHEN METABOLIC PANEL: CPT

## 2024-04-17 PROCEDURE — 96413 CHEMO IV INFUSION 1 HR: CPT

## 2024-04-17 RX ORDER — SODIUM CHLORIDE 9 MG/ML
20 INJECTION, SOLUTION INTRAVENOUS ONCE
Status: CANCELLED | OUTPATIENT
Start: 2024-04-19

## 2024-04-17 RX ORDER — PALONOSETRON 0.05 MG/ML
0.25 INJECTION, SOLUTION INTRAVENOUS ONCE
Status: COMPLETED | OUTPATIENT
Start: 2024-04-17 | End: 2024-04-17

## 2024-04-17 RX ORDER — SODIUM CHLORIDE 9 MG/ML
20 INJECTION, SOLUTION INTRAVENOUS ONCE
Status: DISCONTINUED | OUTPATIENT
Start: 2024-04-17 | End: 2024-04-17 | Stop reason: HOSPADM

## 2024-04-17 RX ORDER — SODIUM CHLORIDE 9 MG/ML
20 INJECTION, SOLUTION INTRAVENOUS ONCE
Status: CANCELLED | OUTPATIENT
Start: 2024-04-18

## 2024-04-17 RX ORDER — SODIUM CHLORIDE 9 MG/ML
20 INJECTION, SOLUTION INTRAVENOUS ONCE
Status: CANCELLED | OUTPATIENT
Start: 2024-04-17

## 2024-04-17 RX ORDER — PALONOSETRON 0.05 MG/ML
0.25 INJECTION, SOLUTION INTRAVENOUS ONCE
Status: CANCELLED | OUTPATIENT
Start: 2024-04-17

## 2024-04-17 RX ADMIN — FOSAPREPITANT DIMEGLUMINE 150 MG: 150 INJECTION, POWDER, LYOPHILIZED, FOR SOLUTION INTRAVENOUS at 12:39

## 2024-04-17 RX ADMIN — SODIUM CHLORIDE 130 MG: 9 INJECTION, SOLUTION INTRAVENOUS at 14:05

## 2024-04-17 RX ADMIN — CARBOPLATIN 510 MG: 10 INJECTION, SOLUTION INTRAVENOUS at 13:26

## 2024-04-17 RX ADMIN — PALONOSETRON 0.25 MG: 0.25 INJECTION, SOLUTION INTRAVENOUS at 12:33

## 2024-04-17 RX ADMIN — DEXAMETHASONE SODIUM PHOSPHATE 12 MG: 4 INJECTION, SOLUTION INTRA-ARTICULAR; INTRALESIONAL; INTRAMUSCULAR; INTRAVENOUS; SOFT TISSUE at 12:33

## 2024-04-17 RX ADMIN — SODIUM CHLORIDE 1500 MG: 9 INJECTION, SOLUTION INTRAVENOUS at 11:28

## 2024-04-18 ENCOUNTER — INFUSION (OUTPATIENT)
Dept: ONCOLOGY | Facility: HOSPITAL | Age: 64
End: 2024-04-18
Payer: MEDICARE

## 2024-04-18 VITALS
TEMPERATURE: 98.4 F | DIASTOLIC BLOOD PRESSURE: 53 MMHG | SYSTOLIC BLOOD PRESSURE: 86 MMHG | HEART RATE: 77 BPM | BODY MASS INDEX: 23.57 KG/M2 | WEIGHT: 146 LBS

## 2024-04-18 DIAGNOSIS — C77.2 SECONDARY MALIGNANT NEOPLASM OF INTRA-ABDOMINAL LYMPH NODES: Primary | ICD-10-CM

## 2024-04-18 DIAGNOSIS — C34.31 SMALL CELL LUNG CANCER, RIGHT LOWER LOBE: ICD-10-CM

## 2024-04-18 PROCEDURE — 25010000002 DEXAMETHASONE SODIUM PHOSPHATE 20 MG/5ML SOLUTION: Performed by: INTERNAL MEDICINE

## 2024-04-18 PROCEDURE — 25010000002 ETOPOSIDE 500 MG/25ML SOLUTION 25 ML VIAL: Performed by: INTERNAL MEDICINE

## 2024-04-18 PROCEDURE — 96375 TX/PRO/DX INJ NEW DRUG ADDON: CPT

## 2024-04-18 PROCEDURE — 96413 CHEMO IV INFUSION 1 HR: CPT

## 2024-04-18 PROCEDURE — 25810000003 SODIUM CHLORIDE 0.9 % SOLUTION 500 ML FLEX CONT: Performed by: INTERNAL MEDICINE

## 2024-04-18 RX ORDER — SODIUM CHLORIDE 9 MG/ML
20 INJECTION, SOLUTION INTRAVENOUS ONCE
Status: DISCONTINUED | OUTPATIENT
Start: 2024-04-18 | End: 2024-04-18 | Stop reason: HOSPADM

## 2024-04-18 RX ADMIN — DEXAMETHASONE SODIUM PHOSPHATE 12 MG: 4 INJECTION, SOLUTION INTRA-ARTICULAR; INTRALESIONAL; INTRAMUSCULAR; INTRAVENOUS; SOFT TISSUE at 11:24

## 2024-04-18 RX ADMIN — SODIUM CHLORIDE 130 MG: 9 INJECTION, SOLUTION INTRAVENOUS at 11:43

## 2024-04-19 ENCOUNTER — INFUSION (OUTPATIENT)
Dept: ONCOLOGY | Facility: HOSPITAL | Age: 64
End: 2024-04-19
Payer: MEDICARE

## 2024-04-19 VITALS
WEIGHT: 149.8 LBS | RESPIRATION RATE: 18 BRPM | HEART RATE: 73 BPM | DIASTOLIC BLOOD PRESSURE: 52 MMHG | OXYGEN SATURATION: 97 % | BODY MASS INDEX: 24.18 KG/M2 | TEMPERATURE: 97.8 F | SYSTOLIC BLOOD PRESSURE: 96 MMHG

## 2024-04-19 DIAGNOSIS — C77.2 SECONDARY MALIGNANT NEOPLASM OF INTRA-ABDOMINAL LYMPH NODES: Primary | ICD-10-CM

## 2024-04-19 DIAGNOSIS — C34.31 SMALL CELL LUNG CANCER, RIGHT LOWER LOBE: ICD-10-CM

## 2024-04-19 PROCEDURE — 96413 CHEMO IV INFUSION 1 HR: CPT

## 2024-04-19 PROCEDURE — 25810000003 SODIUM CHLORIDE 0.9 % SOLUTION 500 ML FLEX CONT: Performed by: INTERNAL MEDICINE

## 2024-04-19 PROCEDURE — 96375 TX/PRO/DX INJ NEW DRUG ADDON: CPT

## 2024-04-19 PROCEDURE — 25010000002 DEXAMETHASONE SODIUM PHOSPHATE 20 MG/5ML SOLUTION: Performed by: INTERNAL MEDICINE

## 2024-04-19 PROCEDURE — 25010000002 ETOPOSIDE 500 MG/25ML SOLUTION 25 ML VIAL: Performed by: INTERNAL MEDICINE

## 2024-04-19 RX ORDER — SODIUM CHLORIDE 9 MG/ML
20 INJECTION, SOLUTION INTRAVENOUS ONCE
Status: DISCONTINUED | OUTPATIENT
Start: 2024-04-19 | End: 2024-04-19 | Stop reason: HOSPADM

## 2024-04-19 RX ADMIN — SODIUM CHLORIDE 130 MG: 9 INJECTION, SOLUTION INTRAVENOUS at 11:42

## 2024-04-19 RX ADMIN — DEXAMETHASONE SODIUM PHOSPHATE 12 MG: 4 INJECTION, SOLUTION INTRA-ARTICULAR; INTRALESIONAL; INTRAMUSCULAR; INTRAVENOUS; SOFT TISSUE at 11:25

## 2024-05-08 ENCOUNTER — OFFICE VISIT (OUTPATIENT)
Dept: ONCOLOGY | Facility: CLINIC | Age: 64
End: 2024-05-08
Payer: MEDICARE

## 2024-05-08 ENCOUNTER — INFUSION (OUTPATIENT)
Dept: ONCOLOGY | Facility: HOSPITAL | Age: 64
End: 2024-05-08
Payer: MEDICARE

## 2024-05-08 VITALS
HEART RATE: 95 BPM | SYSTOLIC BLOOD PRESSURE: 93 MMHG | TEMPERATURE: 97.1 F | RESPIRATION RATE: 16 BRPM | OXYGEN SATURATION: 95 % | WEIGHT: 149 LBS | HEIGHT: 66 IN | DIASTOLIC BLOOD PRESSURE: 56 MMHG | BODY MASS INDEX: 23.95 KG/M2

## 2024-05-08 DIAGNOSIS — C34.31 SMALL CELL LUNG CANCER, RIGHT LOWER LOBE: ICD-10-CM

## 2024-05-08 DIAGNOSIS — C34.31 SMALL CELL LUNG CANCER, RIGHT LOWER LOBE: Primary | ICD-10-CM

## 2024-05-08 DIAGNOSIS — C77.2 SECONDARY MALIGNANT NEOPLASM OF INTRA-ABDOMINAL LYMPH NODES: ICD-10-CM

## 2024-05-08 DIAGNOSIS — C77.2 SECONDARY MALIGNANT NEOPLASM OF INTRA-ABDOMINAL LYMPH NODES: Primary | ICD-10-CM

## 2024-05-08 LAB
ALBUMIN SERPL-MCNC: 4.3 G/DL (ref 3.5–5.2)
ALBUMIN/GLOB SERPL: 1.4 G/DL
ALP SERPL-CCNC: 164 U/L (ref 39–117)
ALT SERPL W P-5'-P-CCNC: 45 U/L (ref 1–33)
ANION GAP SERPL CALCULATED.3IONS-SCNC: 9.9 MMOL/L (ref 5–15)
ANISOCYTOSIS BLD QL: NORMAL
AST SERPL-CCNC: 37 U/L (ref 1–32)
BASOPHILS # BLD AUTO: 0.03 10*3/MM3 (ref 0–0.2)
BASOPHILS NFR BLD AUTO: 0.9 % (ref 0–1.5)
BILIRUB SERPL-MCNC: 0.2 MG/DL (ref 0–1.2)
BUN SERPL-MCNC: 10 MG/DL (ref 8–23)
BUN/CREAT SERPL: 11.6 (ref 7–25)
CALCIUM SPEC-SCNC: 10.4 MG/DL (ref 8.6–10.5)
CHLORIDE SERPL-SCNC: 104 MMOL/L (ref 98–107)
CO2 SERPL-SCNC: 26.1 MMOL/L (ref 22–29)
CREAT SERPL-MCNC: 0.86 MG/DL (ref 0.57–1)
DEPRECATED RDW RBC AUTO: 70.4 FL (ref 37–54)
EGFRCR SERPLBLD CKD-EPI 2021: 76.5 ML/MIN/1.73
EOSINOPHIL # BLD AUTO: 0.04 10*3/MM3 (ref 0–0.4)
EOSINOPHIL NFR BLD AUTO: 1.1 % (ref 0.3–6.2)
ERYTHROCYTE [DISTWIDTH] IN BLOOD BY AUTOMATED COUNT: 21.2 % (ref 12.3–15.4)
GLOBULIN UR ELPH-MCNC: 3 GM/DL
GLUCOSE SERPL-MCNC: 95 MG/DL (ref 65–99)
HCT VFR BLD AUTO: 31.2 % (ref 34–46.6)
HGB BLD-MCNC: 10.6 G/DL (ref 12–15.9)
IMM GRANULOCYTES # BLD AUTO: 0.01 10*3/MM3 (ref 0–0.05)
IMM GRANULOCYTES NFR BLD AUTO: 0.3 % (ref 0–0.5)
LYMPHOCYTES # BLD AUTO: 0.82 10*3/MM3 (ref 0.7–3.1)
LYMPHOCYTES NFR BLD AUTO: 23.6 % (ref 19.6–45.3)
MACROCYTES BLD QL SMEAR: NORMAL
MCH RBC QN AUTO: 31.5 PG (ref 26.6–33)
MCHC RBC AUTO-ENTMCNC: 34 G/DL (ref 31.5–35.7)
MCV RBC AUTO: 92.9 FL (ref 79–97)
MICROCYTES BLD QL: NORMAL
MONOCYTES # BLD AUTO: 0.53 10*3/MM3 (ref 0.1–0.9)
MONOCYTES NFR BLD AUTO: 15.2 % (ref 5–12)
NEUTROPHILS NFR BLD AUTO: 2.05 10*3/MM3 (ref 1.7–7)
NEUTROPHILS NFR BLD AUTO: 58.9 % (ref 42.7–76)
NRBC BLD AUTO-RTO: 0 /100 WBC (ref 0–0.2)
PLATELET # BLD AUTO: 191 10*3/MM3 (ref 140–450)
PMV BLD AUTO: 9.7 FL (ref 6–12)
POTASSIUM SERPL-SCNC: 4.4 MMOL/L (ref 3.5–5.2)
PROT SERPL-MCNC: 7.3 G/DL (ref 6–8.5)
RBC # BLD AUTO: 3.36 10*6/MM3 (ref 3.77–5.28)
SMALL PLATELETS BLD QL SMEAR: ADEQUATE
SODIUM SERPL-SCNC: 140 MMOL/L (ref 136–145)
T4 FREE SERPL-MCNC: 1.75 NG/DL (ref 0.93–1.7)
TSH SERPL DL<=0.05 MIU/L-ACNC: 0.22 UIU/ML (ref 0.27–4.2)
WBC MORPH BLD: NORMAL
WBC NRBC COR # BLD AUTO: 3.48 10*3/MM3 (ref 3.4–10.8)

## 2024-05-08 PROCEDURE — 80053 COMPREHEN METABOLIC PANEL: CPT

## 2024-05-08 PROCEDURE — 84439 ASSAY OF FREE THYROXINE: CPT

## 2024-05-08 PROCEDURE — 85025 COMPLETE CBC W/AUTO DIFF WBC: CPT

## 2024-05-08 PROCEDURE — 25810000003 SODIUM CHLORIDE 0.9 % SOLUTION 250 ML FLEX CONT: Performed by: NURSE PRACTITIONER

## 2024-05-08 PROCEDURE — 25010000002 DURVALUMAB 50 MG/ML SOLUTION 10 ML VIAL: Performed by: NURSE PRACTITIONER

## 2024-05-08 PROCEDURE — 99214 OFFICE O/P EST MOD 30 MIN: CPT | Performed by: NURSE PRACTITIONER

## 2024-05-08 PROCEDURE — 84443 ASSAY THYROID STIM HORMONE: CPT

## 2024-05-08 PROCEDURE — 96413 CHEMO IV INFUSION 1 HR: CPT

## 2024-05-08 PROCEDURE — 1126F AMNT PAIN NOTED NONE PRSNT: CPT | Performed by: NURSE PRACTITIONER

## 2024-05-08 PROCEDURE — 36415 COLL VENOUS BLD VENIPUNCTURE: CPT

## 2024-05-08 PROCEDURE — 85007 BL SMEAR W/DIFF WBC COUNT: CPT

## 2024-05-08 RX ORDER — SODIUM CHLORIDE 9 MG/ML
20 INJECTION, SOLUTION INTRAVENOUS ONCE
Status: DISCONTINUED | OUTPATIENT
Start: 2024-05-08 | End: 2024-05-08 | Stop reason: HOSPADM

## 2024-05-08 RX ORDER — SODIUM CHLORIDE 9 MG/ML
20 INJECTION, SOLUTION INTRAVENOUS ONCE
Status: CANCELLED | OUTPATIENT
Start: 2024-05-09

## 2024-05-08 RX ADMIN — SODIUM CHLORIDE 1500 MG: 9 INJECTION, SOLUTION INTRAVENOUS at 11:19

## 2024-05-09 ENCOUNTER — TELEPHONE (OUTPATIENT)
Dept: NUTRITION | Facility: HOSPITAL | Age: 64
End: 2024-05-09
Payer: MEDICARE

## 2024-05-10 ENCOUNTER — TELEPHONE (OUTPATIENT)
Dept: ONCOLOGY | Facility: CLINIC | Age: 64
End: 2024-05-10
Payer: MEDICARE

## 2024-06-05 ENCOUNTER — INFUSION (OUTPATIENT)
Dept: ONCOLOGY | Facility: HOSPITAL | Age: 64
End: 2024-06-05
Payer: MEDICARE

## 2024-06-05 ENCOUNTER — OFFICE VISIT (OUTPATIENT)
Dept: ONCOLOGY | Facility: CLINIC | Age: 64
End: 2024-06-05
Payer: MEDICARE

## 2024-06-05 VITALS
TEMPERATURE: 97.1 F | HEIGHT: 66 IN | HEART RATE: 85 BPM | WEIGHT: 147.9 LBS | OXYGEN SATURATION: 99 % | RESPIRATION RATE: 16 BRPM | BODY MASS INDEX: 23.77 KG/M2 | SYSTOLIC BLOOD PRESSURE: 114 MMHG | DIASTOLIC BLOOD PRESSURE: 56 MMHG

## 2024-06-05 DIAGNOSIS — C34.31 SMALL CELL LUNG CANCER, RIGHT LOWER LOBE: Primary | ICD-10-CM

## 2024-06-05 DIAGNOSIS — C77.2 SECONDARY MALIGNANT NEOPLASM OF INTRA-ABDOMINAL LYMPH NODES: ICD-10-CM

## 2024-06-05 LAB
ALBUMIN SERPL-MCNC: 4.2 G/DL (ref 3.5–5.2)
ALBUMIN/GLOB SERPL: 1.4 G/DL
ALP SERPL-CCNC: 151 U/L (ref 39–117)
ALT SERPL W P-5'-P-CCNC: 20 U/L (ref 1–33)
ANION GAP SERPL CALCULATED.3IONS-SCNC: 9.4 MMOL/L (ref 5–15)
AST SERPL-CCNC: 23 U/L (ref 1–32)
BASOPHILS # BLD AUTO: 0.05 10*3/MM3 (ref 0–0.2)
BASOPHILS NFR BLD AUTO: 1 % (ref 0–1.5)
BILIRUB SERPL-MCNC: 0.4 MG/DL (ref 0–1.2)
BUN SERPL-MCNC: 9 MG/DL (ref 8–23)
BUN/CREAT SERPL: 13.2 (ref 7–25)
CALCIUM SPEC-SCNC: 10.2 MG/DL (ref 8.6–10.5)
CHLORIDE SERPL-SCNC: 103 MMOL/L (ref 98–107)
CO2 SERPL-SCNC: 25.6 MMOL/L (ref 22–29)
CREAT SERPL-MCNC: 0.68 MG/DL (ref 0.57–1)
DEPRECATED RDW RBC AUTO: 50.6 FL (ref 37–54)
EGFRCR SERPLBLD CKD-EPI 2021: 98 ML/MIN/1.73
EOSINOPHIL # BLD AUTO: 0.16 10*3/MM3 (ref 0–0.4)
EOSINOPHIL NFR BLD AUTO: 3.3 % (ref 0.3–6.2)
ERYTHROCYTE [DISTWIDTH] IN BLOOD BY AUTOMATED COUNT: 14.2 % (ref 12.3–15.4)
GLOBULIN UR ELPH-MCNC: 2.9 GM/DL
GLUCOSE SERPL-MCNC: 86 MG/DL (ref 65–99)
HCT VFR BLD AUTO: 35.5 % (ref 34–46.6)
HGB BLD-MCNC: 11.8 G/DL (ref 12–15.9)
IMM GRANULOCYTES # BLD AUTO: 0.01 10*3/MM3 (ref 0–0.05)
IMM GRANULOCYTES NFR BLD AUTO: 0.2 % (ref 0–0.5)
LYMPHOCYTES # BLD AUTO: 1.04 10*3/MM3 (ref 0.7–3.1)
LYMPHOCYTES NFR BLD AUTO: 21.4 % (ref 19.6–45.3)
MCH RBC QN AUTO: 32.2 PG (ref 26.6–33)
MCHC RBC AUTO-ENTMCNC: 33.2 G/DL (ref 31.5–35.7)
MCV RBC AUTO: 96.7 FL (ref 79–97)
MONOCYTES # BLD AUTO: 0.41 10*3/MM3 (ref 0.1–0.9)
MONOCYTES NFR BLD AUTO: 8.4 % (ref 5–12)
NEUTROPHILS NFR BLD AUTO: 3.2 10*3/MM3 (ref 1.7–7)
NEUTROPHILS NFR BLD AUTO: 65.7 % (ref 42.7–76)
NRBC BLD AUTO-RTO: 0 /100 WBC (ref 0–0.2)
PLATELET # BLD AUTO: 224 10*3/MM3 (ref 140–450)
PMV BLD AUTO: 9.7 FL (ref 6–12)
POTASSIUM SERPL-SCNC: 4.3 MMOL/L (ref 3.5–5.2)
PROT SERPL-MCNC: 7.1 G/DL (ref 6–8.5)
RBC # BLD AUTO: 3.67 10*6/MM3 (ref 3.77–5.28)
SODIUM SERPL-SCNC: 138 MMOL/L (ref 136–145)
T4 FREE SERPL-MCNC: 1.78 NG/DL (ref 0.92–1.68)
TSH SERPL DL<=0.05 MIU/L-ACNC: 0.11 UIU/ML (ref 0.27–4.2)
WBC NRBC COR # BLD AUTO: 4.87 10*3/MM3 (ref 3.4–10.8)

## 2024-06-05 PROCEDURE — 84439 ASSAY OF FREE THYROXINE: CPT

## 2024-06-05 PROCEDURE — 25810000003 SODIUM CHLORIDE 0.9 % SOLUTION 250 ML FLEX CONT: Performed by: INTERNAL MEDICINE

## 2024-06-05 PROCEDURE — 25010000002 DURVALUMAB 50 MG/ML SOLUTION 10 ML VIAL: Performed by: INTERNAL MEDICINE

## 2024-06-05 PROCEDURE — 85025 COMPLETE CBC W/AUTO DIFF WBC: CPT

## 2024-06-05 PROCEDURE — 84443 ASSAY THYROID STIM HORMONE: CPT

## 2024-06-05 PROCEDURE — 80053 COMPREHEN METABOLIC PANEL: CPT

## 2024-06-05 PROCEDURE — 99214 OFFICE O/P EST MOD 30 MIN: CPT | Performed by: INTERNAL MEDICINE

## 2024-06-05 PROCEDURE — 1126F AMNT PAIN NOTED NONE PRSNT: CPT | Performed by: INTERNAL MEDICINE

## 2024-06-05 PROCEDURE — 36415 COLL VENOUS BLD VENIPUNCTURE: CPT

## 2024-06-05 PROCEDURE — 96413 CHEMO IV INFUSION 1 HR: CPT

## 2024-06-05 RX ORDER — SODIUM CHLORIDE 9 MG/ML
20 INJECTION, SOLUTION INTRAVENOUS ONCE
Status: DISCONTINUED | OUTPATIENT
Start: 2024-06-05 | End: 2024-06-05 | Stop reason: HOSPADM

## 2024-06-05 RX ORDER — LEVOTHYROXINE SODIUM 88 UG/1
88 TABLET ORAL DAILY
COMMUNITY
Start: 2024-05-28 | End: 2025-05-28

## 2024-06-05 RX ORDER — SODIUM CHLORIDE 9 MG/ML
20 INJECTION, SOLUTION INTRAVENOUS ONCE
Status: CANCELLED | OUTPATIENT
Start: 2024-06-06

## 2024-06-05 RX ADMIN — SODIUM CHLORIDE 1500 MG: 9 INJECTION, SOLUTION INTRAVENOUS at 11:06

## 2024-06-05 NOTE — PROGRESS NOTES
DATE OF VISIT: 6/5/2024    REASON FOR VISIT: Followup for right lower lobe small cell lung cancer     PROBLEM LIST:  1. Right lower lobe small cell lung cancer T1 N1 M0 stage IIb:  A.  Presented with ataxia and lower extremities weakness  B.  Negative bronchoscopy August 19, 2020 and endobronchial ultrasound September 9, 2020  C.  CT chest done December 7, 2020 revealed 2.7 cm right hilar mass  D.  Whole-body PET scan done January 11, 2020 revealed isolated hypermetabolic activity at the right hilar mass SUV of 12  E. positive serology for AGN A-1 antibody which is 92% predicted for small cell lung cancer, positive N type calcium channel blocker and P/Q type calcium channel blooker.  F.  Started chest radiation February 26, 2021, completed end of March 2021  G.  Repeated PET scan done June 08, 2023 revealed complete resolution of activity in the chest however new portacaval hypermetabolic mass.  Biopsy-proven small cell cancer July 12, 2023  H.  Treated with definitive radiotherapy, completed July 2023.  I.  Progressive disease with worsening para-aortic lymphadenopathy document CT scans in February 1, 2024.  J.  Started chemotherapy with carboplatin etoposide and Imfinzi February 2024, Status post 3 cycles.  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  Hypertension  4.  Hypercholesteremia      HISTORY OF PRESENT ILLNESS: The patient is a very pleasant 64 y.o. female  with past medical history significant for suspected small cell lung cancer of the right lung. The patient never had a positive biopsy however her serology was suggestive of SCLCA after presenting with Lambert-Eaton syndrome. The patient is here today for scheduled follow up visit with treatment maintenance therapy.      SUBJECTIVE: Gayatri is here today with her .  All in all she is doing well.  She still having double vision.    Past History:  Medical History: has a past medical history of Arthritis, Disease of thyroid gland,  Heart disease, History of radiation therapy (08/11/2023), Hyperlipidemia, Lambert-Eaton syndrome, Lung cancer (02/2021), Metastatic cancer, Pneumonia, and Requires supplemental oxygen.   Surgical History: has a past surgical history that includes Hysterectomy; Foot surgery; and Knee surgery.   Family History: family history includes Brain cancer in her father; Breast cancer in her sister; Heart disease in her brother, mother, and sister; Kidney disease in her mother.   Social History: reports that she quit smoking about 22 months ago. Her smoking use included cigarettes. She started smoking about 16 years ago. She has a 15 pack-year smoking history. She has never used smokeless tobacco. She reports that she does not drink alcohol and does not use drugs.    (Not in a hospital admission)     Allergies: Erythromycin     Review of Systems   Constitutional:  Positive for fatigue.   Respiratory: Negative.     Gastrointestinal: Negative.    Musculoskeletal:  Positive for gait problem.   Neurological:  Positive for dizziness and weakness.         Current Outpatient Medications:     albuterol sulfate  (90 Base) MCG/ACT inhaler, Inhale 2 puffs Every 4 (Four) Hours As Needed., Disp: , Rfl:     Amifampridine Phosphate (Firdapse) 10 MG tablet, Take 2 tablets by mouth 4 (Four) Times a Day., Disp: , Rfl:     aspirin 81 MG EC tablet, Take 1 tablet by mouth Daily. 7-6-23 last dose, Disp: , Rfl:     atorvastatin (LIPITOR) 20 MG tablet, Take 1 tablet by mouth Every Night., Disp: , Rfl:     budesonide (PULMICORT) 0.5 MG/2ML nebulizer solution, , Disp: , Rfl:     furosemide (LASIX) 40 MG tablet, Take 1 tablet by mouth Daily., Disp: , Rfl:     ipratropium-albuterol (DUO-NEB) 0.5-2.5 mg/3 ml nebulizer, , Disp: , Rfl:     levalbuterol (XOPENEX) 1.25 MG/3ML nebulizer solution, , Disp: , Rfl:     levothyroxine (SYNTHROID, LEVOTHROID) 88 MCG tablet, Take 1 tablet by mouth Daily., Disp: , Rfl:     metoprolol tartrate (LOPRESSOR) 50 MG  "tablet, Take 0.5 tablets by mouth Daily., Disp: , Rfl:     ondansetron (ZOFRAN) 8 MG tablet, Take 1 tablet by mouth 3 (Three) Times a Day As Needed for Nausea or Vomiting., Disp: 30 tablet, Rfl: 5    potassium chloride (K-DUR,KLOR-CON) 20 MEQ CR tablet, Take 1 tablet by mouth Daily., Disp: , Rfl:     spironolactone (ALDACTONE) 25 MG tablet, Take 1 tablet by mouth Daily., Disp: , Rfl:     levothyroxine (SYNTHROID, LEVOTHROID) 100 MCG tablet, Take 1 tablet by mouth Daily. (Patient not taking: Reported on 6/5/2024), Disp: , Rfl:     PHYSICAL EXAMINATION:   /56   Pulse 85   Temp 97.1 °F (36.2 °C)   Resp 16   Ht 167.6 cm (65.98\")   Wt 67.1 kg (147 lb 14.4 oz)   SpO2 99%   BMI 23.88 kg/m²    Pain Score    06/05/24 0839   PainSc: 0-No pain            ECOG score: 3            ECOG Performance Status: 2 - Symptomatic, <50% confined to bed  General Appearance:  alert, cooperative, no apparent distress and appears stated age   Neurologic/Psychiatric: A&O x 3, gait steady, appropriate affect, strength 5/5 in all muscle groups   HEENT:  Normocephalic, without obvious abnormality, mucous membranes moist   Neck: Supple, symmetrical, trachea midline, no adenopathy;  No thyromegaly, masses, or tenderness   Lungs:   Clear to auscultation bilaterally; respirations regular, even, and unlabored bilaterally   Heart:  Regular rate and rhythm, no murmurs appreciated   Abdomen:   Soft, non-tender, and non-distended   Lymph nodes: No cervical, supraclavicular, inguinal or axillary adenopathy noted   Extremities: Normal, atraumatic; no clubbing, cyanosis, or edema    Skin: No rashes, ulcers, or suspicious lesions noted     No visits with results within 2 Week(s) from this visit.   Latest known visit with results is:   Infusion on 05/08/2024   Component Date Value Ref Range Status    Free T4 05/08/2024 1.75 (H)  0.93 - 1.70 ng/dL Final    TSH 05/08/2024 0.223 (L)  0.270 - 4.200 uIU/mL Final    Glucose 05/08/2024 95  65 - 99 mg/dL " Final    BUN 05/08/2024 10  8 - 23 mg/dL Final    Creatinine 05/08/2024 0.86  0.57 - 1.00 mg/dL Final    Sodium 05/08/2024 140  136 - 145 mmol/L Final    Potassium 05/08/2024 4.4  3.5 - 5.2 mmol/L Final    Chloride 05/08/2024 104  98 - 107 mmol/L Final    CO2 05/08/2024 26.1  22.0 - 29.0 mmol/L Final    Calcium 05/08/2024 10.4  8.6 - 10.5 mg/dL Final    Total Protein 05/08/2024 7.3  6.0 - 8.5 g/dL Final    Albumin 05/08/2024 4.3  3.5 - 5.2 g/dL Final    ALT (SGPT) 05/08/2024 45 (H)  1 - 33 U/L Final    AST (SGOT) 05/08/2024 37 (H)  1 - 32 U/L Final    Alkaline Phosphatase 05/08/2024 164 (H)  39 - 117 U/L Final    Total Bilirubin 05/08/2024 0.2  0.0 - 1.2 mg/dL Final    Globulin 05/08/2024 3.0  gm/dL Final    A/G Ratio 05/08/2024 1.4  g/dL Final    BUN/Creatinine Ratio 05/08/2024 11.6  7.0 - 25.0 Final    Anion Gap 05/08/2024 9.9  5.0 - 15.0 mmol/L Final    eGFR 05/08/2024 76.5  >60.0 mL/min/1.73 Final    WBC 05/08/2024 3.48  3.40 - 10.80 10*3/mm3 Final    RBC 05/08/2024 3.36 (L)  3.77 - 5.28 10*6/mm3 Final    Hemoglobin 05/08/2024 10.6 (L)  12.0 - 15.9 g/dL Final    Hematocrit 05/08/2024 31.2 (L)  34.0 - 46.6 % Final    MCV 05/08/2024 92.9  79.0 - 97.0 fL Final    MCH 05/08/2024 31.5  26.6 - 33.0 pg Final    MCHC 05/08/2024 34.0  31.5 - 35.7 g/dL Final    RDW 05/08/2024 21.2 (H)  12.3 - 15.4 % Final    RDW-SD 05/08/2024 70.4 (H)  37.0 - 54.0 fl Final    MPV 05/08/2024 9.7  6.0 - 12.0 fL Final    Platelets 05/08/2024 191  140 - 450 10*3/mm3 Final    Neutrophil % 05/08/2024 58.9  42.7 - 76.0 % Final    Lymphocyte % 05/08/2024 23.6  19.6 - 45.3 % Final    Monocyte % 05/08/2024 15.2 (H)  5.0 - 12.0 % Final    Eosinophil % 05/08/2024 1.1  0.3 - 6.2 % Final    Basophil % 05/08/2024 0.9  0.0 - 1.5 % Final    Immature Grans % 05/08/2024 0.3  0.0 - 0.5 % Final    Neutrophils, Absolute 05/08/2024 2.05  1.70 - 7.00 10*3/mm3 Final    Lymphocytes, Absolute 05/08/2024 0.82  0.70 - 3.10 10*3/mm3 Final    Monocytes, Absolute  05/08/2024 0.53  0.10 - 0.90 10*3/mm3 Final    Eosinophils, Absolute 05/08/2024 0.04  0.00 - 0.40 10*3/mm3 Final    Basophils, Absolute 05/08/2024 0.03  0.00 - 0.20 10*3/mm3 Final    Immature Grans, Absolute 05/08/2024 0.01  0.00 - 0.05 10*3/mm3 Final    nRBC 05/08/2024 0.0  0.0 - 0.2 /100 WBC Final    Anisocytosis 05/08/2024 Mod/2+  None Seen Final    Microcytes 05/08/2024 Slight/1+  None Seen Final    Macrocytes 05/08/2024 Slight/1+  None Seen Final    WBC Morphology 05/08/2024 Normal  Normal Final    Platelet Estimate 05/08/2024 Adequate  Normal Final        No results found.      ASSESSMENT: The patient is a very pleasant 64 y.o. female  with small cell lung cancer      PLAN:    1. Right lower lobe small cell lung cancer TxN2 M1b stage Mayelin:  A.  I will proceed with treatment as scheduled today maintenance immunotherapy Imfinzi cycle #2.  B.  She will follow-up with us in 4 weeks for cycle #3.  C. I will do 3 months follow-up study which will be due mid July 2024.  I will order those for prior to cycle #4.  D.  I discussed with the patient and her  today blood results from March 27, 2024.  Hemoglobin 11.0 blood cells 2.5 platelets normal 161 CMP essentially normal slight elevated alkaline phosphatase 142.  E.  We reviewed potential side effects of immunotherapy including but not limited to immune mediated reactions with thyroiditis, pneumonitis, hepatitis, colitis, rash, and electrolyte abnormalities, fatigue, multiorgan failure, and possibly death.  H.  I will continue to monitor the patient blood work including blood counts kidney function liver function and electrolytes.    2. Lambert-Eaton syndrome:  A. She will continue Firdapse 10 mg 4 times daily as prescribed by neurology.  B.  I do think this should be a contraindication for her immunotherapy since chemotherapy with treating the underlying cause for her neurologic syndrome.  C.  They are considering plasma exchange once chemotherapy has  completed.    3. Hypothyroid:  A. She will continue Synthroid daily    4.  Poor systolic cardiac function:  A.  Status post defibrillator placement.    5.  Dizziness:  A.  MRI on 2/12/2024 showed negative for metastatic disease.    6.  Chemotherapy-induced nausea:  A.  I will continue Zofran as needed.    FOLLOW UP: 3 weeks with cycle 3.      Susi Irving MD  6/5/2024

## 2024-06-19 ENCOUNTER — TELEPHONE (OUTPATIENT)
Dept: NUTRITION | Facility: HOSPITAL | Age: 64
End: 2024-06-19
Payer: MEDICARE

## 2024-06-19 NOTE — PROGRESS NOTES
Adult Outpatient Nutrition  Assessment    Patient Name:  Gayatri Kahn  YOB: 1960  MRN: [id number]    Assessment Date:  6/19/2024    Comments:  Called pt in regards to nutrition oncology f/up. Pt's  answered the phone.  reported pt has not had any wt loss, change in appetite, or difficulty with eating.  had no questions/concerns at this time.     Will f/up in 1 month.    Electronically signed by:  Veronika Yusuf RD  06/19/24 12:27 EDT

## 2024-07-03 ENCOUNTER — INFUSION (OUTPATIENT)
Dept: ONCOLOGY | Facility: HOSPITAL | Age: 64
End: 2024-07-03
Payer: MEDICARE

## 2024-07-03 ENCOUNTER — OFFICE VISIT (OUTPATIENT)
Dept: ONCOLOGY | Facility: CLINIC | Age: 64
End: 2024-07-03
Payer: MEDICARE

## 2024-07-03 VITALS
WEIGHT: 144.5 LBS | BODY MASS INDEX: 23.22 KG/M2 | HEART RATE: 92 BPM | TEMPERATURE: 97.7 F | RESPIRATION RATE: 16 BRPM | DIASTOLIC BLOOD PRESSURE: 57 MMHG | HEIGHT: 66 IN | OXYGEN SATURATION: 98 % | SYSTOLIC BLOOD PRESSURE: 108 MMHG

## 2024-07-03 DIAGNOSIS — C34.31 SMALL CELL LUNG CANCER, RIGHT LOWER LOBE: Primary | ICD-10-CM

## 2024-07-03 DIAGNOSIS — C77.2 SECONDARY MALIGNANT NEOPLASM OF INTRA-ABDOMINAL LYMPH NODES: ICD-10-CM

## 2024-07-03 LAB
ALBUMIN SERPL-MCNC: 4.1 G/DL (ref 3.5–5.2)
ALBUMIN/GLOB SERPL: 1.3 G/DL
ALP SERPL-CCNC: 169 U/L (ref 39–117)
ALT SERPL W P-5'-P-CCNC: 19 U/L (ref 1–33)
ANION GAP SERPL CALCULATED.3IONS-SCNC: 11.2 MMOL/L (ref 5–15)
AST SERPL-CCNC: 24 U/L (ref 1–32)
BASOPHILS # BLD AUTO: 0.03 10*3/MM3 (ref 0–0.2)
BASOPHILS NFR BLD AUTO: 0.5 % (ref 0–1.5)
BILIRUB SERPL-MCNC: 0.3 MG/DL (ref 0–1.2)
BUN SERPL-MCNC: 8 MG/DL (ref 8–23)
BUN/CREAT SERPL: 11.3 (ref 7–25)
CALCIUM SPEC-SCNC: 10 MG/DL (ref 8.6–10.5)
CHLORIDE SERPL-SCNC: 102 MMOL/L (ref 98–107)
CO2 SERPL-SCNC: 24.8 MMOL/L (ref 22–29)
CREAT SERPL-MCNC: 0.71 MG/DL (ref 0.57–1)
DEPRECATED RDW RBC AUTO: 41.5 FL (ref 37–54)
EGFRCR SERPLBLD CKD-EPI 2021: 95.7 ML/MIN/1.73
EOSINOPHIL # BLD AUTO: 0.05 10*3/MM3 (ref 0–0.4)
EOSINOPHIL NFR BLD AUTO: 0.8 % (ref 0.3–6.2)
ERYTHROCYTE [DISTWIDTH] IN BLOOD BY AUTOMATED COUNT: 12.3 % (ref 12.3–15.4)
GLOBULIN UR ELPH-MCNC: 3.2 GM/DL
GLUCOSE SERPL-MCNC: 118 MG/DL (ref 65–99)
HCT VFR BLD AUTO: 36.8 % (ref 34–46.6)
HGB BLD-MCNC: 12.7 G/DL (ref 12–15.9)
IMM GRANULOCYTES # BLD AUTO: 0.01 10*3/MM3 (ref 0–0.05)
IMM GRANULOCYTES NFR BLD AUTO: 0.2 % (ref 0–0.5)
LYMPHOCYTES # BLD AUTO: 0.98 10*3/MM3 (ref 0.7–3.1)
LYMPHOCYTES NFR BLD AUTO: 16.6 % (ref 19.6–45.3)
MCH RBC QN AUTO: 31.8 PG (ref 26.6–33)
MCHC RBC AUTO-ENTMCNC: 34.5 G/DL (ref 31.5–35.7)
MCV RBC AUTO: 92.2 FL (ref 79–97)
MONOCYTES # BLD AUTO: 0.46 10*3/MM3 (ref 0.1–0.9)
MONOCYTES NFR BLD AUTO: 7.8 % (ref 5–12)
NEUTROPHILS NFR BLD AUTO: 4.37 10*3/MM3 (ref 1.7–7)
NEUTROPHILS NFR BLD AUTO: 74.1 % (ref 42.7–76)
NRBC BLD AUTO-RTO: 0 /100 WBC (ref 0–0.2)
PLATELET # BLD AUTO: 266 10*3/MM3 (ref 140–450)
PMV BLD AUTO: 10 FL (ref 6–12)
POTASSIUM SERPL-SCNC: 4.5 MMOL/L (ref 3.5–5.2)
PROT SERPL-MCNC: 7.3 G/DL (ref 6–8.5)
RBC # BLD AUTO: 3.99 10*6/MM3 (ref 3.77–5.28)
SODIUM SERPL-SCNC: 138 MMOL/L (ref 136–145)
T4 FREE SERPL-MCNC: 1.64 NG/DL (ref 0.92–1.68)
TSH SERPL DL<=0.05 MIU/L-ACNC: 0.17 UIU/ML (ref 0.27–4.2)
WBC NRBC COR # BLD AUTO: 5.9 10*3/MM3 (ref 3.4–10.8)

## 2024-07-03 PROCEDURE — 99214 OFFICE O/P EST MOD 30 MIN: CPT | Performed by: INTERNAL MEDICINE

## 2024-07-03 PROCEDURE — 84443 ASSAY THYROID STIM HORMONE: CPT

## 2024-07-03 PROCEDURE — 36415 COLL VENOUS BLD VENIPUNCTURE: CPT

## 2024-07-03 PROCEDURE — 96413 CHEMO IV INFUSION 1 HR: CPT

## 2024-07-03 PROCEDURE — 85025 COMPLETE CBC W/AUTO DIFF WBC: CPT

## 2024-07-03 PROCEDURE — 25810000003 SODIUM CHLORIDE 0.9 % SOLUTION 250 ML FLEX CONT: Performed by: INTERNAL MEDICINE

## 2024-07-03 PROCEDURE — 84439 ASSAY OF FREE THYROXINE: CPT

## 2024-07-03 PROCEDURE — 25010000002 DURVALUMAB 50 MG/ML SOLUTION 10 ML VIAL: Performed by: INTERNAL MEDICINE

## 2024-07-03 PROCEDURE — 80053 COMPREHEN METABOLIC PANEL: CPT

## 2024-07-03 PROCEDURE — 1126F AMNT PAIN NOTED NONE PRSNT: CPT | Performed by: INTERNAL MEDICINE

## 2024-07-03 RX ORDER — CEPHALEXIN 500 MG/1
CAPSULE ORAL
COMMUNITY
Start: 2024-06-25

## 2024-07-03 RX ORDER — SODIUM CHLORIDE 9 MG/ML
20 INJECTION, SOLUTION INTRAVENOUS ONCE
Status: CANCELLED | OUTPATIENT
Start: 2024-07-04

## 2024-07-03 RX ORDER — SODIUM CHLORIDE 9 MG/ML
20 INJECTION, SOLUTION INTRAVENOUS ONCE
Status: DISCONTINUED | OUTPATIENT
Start: 2024-07-03 | End: 2024-07-03 | Stop reason: HOSPADM

## 2024-07-03 RX ADMIN — SODIUM CHLORIDE 1500 MG: 9 INJECTION, SOLUTION INTRAVENOUS at 11:15

## 2024-07-03 NOTE — PROGRESS NOTES
DATE OF VISIT: 7/3/2024    REASON FOR VISIT: Followup for right lower lobe small cell lung cancer     PROBLEM LIST:  1. Right lower lobe small cell lung cancer T1 N1 M0 stage IIb:  A.  Presented with ataxia and lower extremities weakness  B.  Negative bronchoscopy August 19, 2020 and endobronchial ultrasound September 9, 2020  C.  CT chest done December 7, 2020 revealed 2.7 cm right hilar mass  D.  Whole-body PET scan done January 11, 2020 revealed isolated hypermetabolic activity at the right hilar mass SUV of 12  E. positive serology for AGN A-1 antibody which is 92% predicted for small cell lung cancer, positive N type calcium channel blocker and P/Q type calcium channel blooker.  F.  Started chest radiation February 26, 2021, completed end of March 2021  G.  Repeated PET scan done June 08, 2023 revealed complete resolution of activity in the chest however new portacaval hypermetabolic mass.  Biopsy-proven small cell cancer July 12, 2023  H.  Treated with definitive radiotherapy, completed July 2023.  I.  Progressive disease with worsening para-aortic lymphadenopathy document CT scans in February 1, 2024.  J.  Started chemotherapy with carboplatin etoposide and Imfinzi February 2024, Status post 4 cycles.  K.  Started maintenance Imfinzi May 8, 2024.  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  Hypertension  4.  Hypercholesteremia      HISTORY OF PRESENT ILLNESS: The patient is a very pleasant 64 y.o. female  with past medical history significant for suspected small cell lung cancer of the right lung. The patient never had a positive biopsy however her serology was suggestive of SCLCA after presenting with Lambert-Eaton syndrome. The patient is here today for scheduled follow up visit with treatment maintenance therapy.      SUBJECTIVE: Gayatri is here today with her .  She is complaining of mild fatigue.  Her dizziness has improved.    Past History:  Medical History:  has a past medical history of Arthritis, Disease of thyroid gland, Heart disease, History of radiation therapy (08/11/2023), Hyperlipidemia, Lambert-Eaton syndrome, Lung cancer (02/2021), Metastatic cancer, Pneumonia, and Requires supplemental oxygen.   Surgical History: has a past surgical history that includes Hysterectomy; Foot surgery; and Knee surgery.   Family History: family history includes Brain cancer in her father; Breast cancer in her sister; Heart disease in her brother, mother, and sister; Kidney disease in her mother.   Social History: reports that she quit smoking about 23 months ago. Her smoking use included cigarettes. She started smoking about 16 years ago. She has a 15 pack-year smoking history. She has never used smokeless tobacco. She reports that she does not drink alcohol and does not use drugs.    (Not in a hospital admission)     Allergies: Erythromycin     Review of Systems   Constitutional:  Positive for fatigue.   Respiratory: Negative.     Gastrointestinal: Negative.    Musculoskeletal:  Positive for gait problem.   Neurological:  Positive for dizziness and weakness.         Current Outpatient Medications:     albuterol sulfate  (90 Base) MCG/ACT inhaler, Inhale 2 puffs Every 4 (Four) Hours As Needed., Disp: , Rfl:     Amifampridine Phosphate (Firdapse) 10 MG tablet, Take 2 tablets by mouth 4 (Four) Times a Day., Disp: , Rfl:     aspirin 81 MG EC tablet, Take 1 tablet by mouth Daily. 7-6-23 last dose, Disp: , Rfl:     atorvastatin (LIPITOR) 20 MG tablet, Take 1 tablet by mouth Every Night., Disp: , Rfl:     budesonide (PULMICORT) 0.5 MG/2ML nebulizer solution, , Disp: , Rfl:     cephalexin (KEFLEX) 500 MG capsule, , Disp: , Rfl:     furosemide (LASIX) 40 MG tablet, Take 1 tablet by mouth Daily., Disp: , Rfl:     ipratropium-albuterol (DUO-NEB) 0.5-2.5 mg/3 ml nebulizer, , Disp: , Rfl:     levalbuterol (XOPENEX) 1.25 MG/3ML nebulizer solution, , Disp: , Rfl:     levothyroxine  "(SYNTHROID, LEVOTHROID) 100 MCG tablet, Take 1 tablet by mouth Daily., Disp: , Rfl:     levothyroxine (SYNTHROID, LEVOTHROID) 88 MCG tablet, Take 1 tablet by mouth Daily., Disp: , Rfl:     metoprolol tartrate (LOPRESSOR) 50 MG tablet, Take 0.5 tablets by mouth Daily., Disp: , Rfl:     mupirocin (BACTROBAN) 2 % ointment, , Disp: , Rfl:     ondansetron (ZOFRAN) 8 MG tablet, Take 1 tablet by mouth 3 (Three) Times a Day As Needed for Nausea or Vomiting., Disp: 30 tablet, Rfl: 5    potassium chloride (K-DUR,KLOR-CON) 20 MEQ CR tablet, Take 1 tablet by mouth Daily., Disp: , Rfl:     spironolactone (ALDACTONE) 25 MG tablet, Take 1 tablet by mouth Daily., Disp: , Rfl:     PHYSICAL EXAMINATION:   /57   Pulse 92   Temp 97.7 °F (36.5 °C)   Resp 16   Ht 167.6 cm (65.98\")   Wt 65.5 kg (144 lb 8 oz)   SpO2 98%   BMI 23.33 kg/m²    Pain Score    07/03/24 0849   PainSc: 0-No pain              ECOG score: 3            ECOG Performance Status: 2 - Symptomatic, <50% confined to bed  General Appearance:  alert, cooperative, no apparent distress and appears stated age   Neurologic/Psychiatric: A&O x 3, gait steady, appropriate affect, strength 5/5 in all muscle groups   HEENT:  Normocephalic, without obvious abnormality, mucous membranes moist   Neck: Supple, symmetrical, trachea midline, no adenopathy;  No thyromegaly, masses, or tenderness   Lungs:   Clear to auscultation bilaterally; respirations regular, even, and unlabored bilaterally   Heart:  Regular rate and rhythm, no murmurs appreciated   Abdomen:   Soft, non-tender, and non-distended   Lymph nodes: No cervical, supraclavicular, inguinal or axillary adenopathy noted   Extremities: Normal, atraumatic; no clubbing, cyanosis, or edema    Skin: No rashes, ulcers, or suspicious lesions noted     No visits with results within 2 Week(s) from this visit.   Latest known visit with results is:   Infusion on 06/05/2024   Component Date Value Ref Range Status    Glucose " 06/05/2024 86  65 - 99 mg/dL Final    BUN 06/05/2024 9  8 - 23 mg/dL Final    Creatinine 06/05/2024 0.68  0.57 - 1.00 mg/dL Final    Sodium 06/05/2024 138  136 - 145 mmol/L Final    Potassium 06/05/2024 4.3  3.5 - 5.2 mmol/L Final    Chloride 06/05/2024 103  98 - 107 mmol/L Final    CO2 06/05/2024 25.6  22.0 - 29.0 mmol/L Final    Calcium 06/05/2024 10.2  8.6 - 10.5 mg/dL Final    Total Protein 06/05/2024 7.1  6.0 - 8.5 g/dL Final    Albumin 06/05/2024 4.2  3.5 - 5.2 g/dL Final    ALT (SGPT) 06/05/2024 20  1 - 33 U/L Final    AST (SGOT) 06/05/2024 23  1 - 32 U/L Final    Alkaline Phosphatase 06/05/2024 151 (H)  39 - 117 U/L Final    Total Bilirubin 06/05/2024 0.4  0.0 - 1.2 mg/dL Final    Globulin 06/05/2024 2.9  gm/dL Final    A/G Ratio 06/05/2024 1.4  g/dL Final    BUN/Creatinine Ratio 06/05/2024 13.2  7.0 - 25.0 Final    Anion Gap 06/05/2024 9.4  5.0 - 15.0 mmol/L Final    eGFR 06/05/2024 98.0  >60.0 mL/min/1.73 Final    TSH 06/05/2024 0.114 (L)  0.270 - 4.200 uIU/mL Final    Free T4 06/05/2024 1.78 (H)  0.92 - 1.68 ng/dL Final    WBC 06/05/2024 4.87  3.40 - 10.80 10*3/mm3 Final    RBC 06/05/2024 3.67 (L)  3.77 - 5.28 10*6/mm3 Final    Hemoglobin 06/05/2024 11.8 (L)  12.0 - 15.9 g/dL Final    Hematocrit 06/05/2024 35.5  34.0 - 46.6 % Final    MCV 06/05/2024 96.7  79.0 - 97.0 fL Final    MCH 06/05/2024 32.2  26.6 - 33.0 pg Final    MCHC 06/05/2024 33.2  31.5 - 35.7 g/dL Final    RDW 06/05/2024 14.2  12.3 - 15.4 % Final    RDW-SD 06/05/2024 50.6  37.0 - 54.0 fl Final    MPV 06/05/2024 9.7  6.0 - 12.0 fL Final    Platelets 06/05/2024 224  140 - 450 10*3/mm3 Final    Neutrophil % 06/05/2024 65.7  42.7 - 76.0 % Final    Lymphocyte % 06/05/2024 21.4  19.6 - 45.3 % Final    Monocyte % 06/05/2024 8.4  5.0 - 12.0 % Final    Eosinophil % 06/05/2024 3.3  0.3 - 6.2 % Final    Basophil % 06/05/2024 1.0  0.0 - 1.5 % Final    Immature Grans % 06/05/2024 0.2  0.0 - 0.5 % Final    Neutrophils, Absolute 06/05/2024 3.20  1.70 -  7.00 10*3/mm3 Final    Lymphocytes, Absolute 06/05/2024 1.04  0.70 - 3.10 10*3/mm3 Final    Monocytes, Absolute 06/05/2024 0.41  0.10 - 0.90 10*3/mm3 Final    Eosinophils, Absolute 06/05/2024 0.16  0.00 - 0.40 10*3/mm3 Final    Basophils, Absolute 06/05/2024 0.05  0.00 - 0.20 10*3/mm3 Final    Immature Grans, Absolute 06/05/2024 0.01  0.00 - 0.05 10*3/mm3 Final    nRBC 06/05/2024 0.0  0.0 - 0.2 /100 WBC Final        No results found.      ASSESSMENT: The patient is a very pleasant 64 y.o. female  with small cell lung cancer      PLAN:    1. Right lower lobe small cell lung cancer TxN2 M1b stage Mayelin:  A.  I will proceed with treatment as scheduled today maintenance immunotherapy Imfinzi cycle #3.  B.  She will follow-up with us in 4 weeks for cycle #4.  C. I will do 3 months follow-up study which will be due mid July 2024.  I will order those for prior to return D.  We reviewed potential side effects of immunotherapy including but not limited to immune mediated reactions with thyroiditis, pneumonitis, hepatitis, colitis, rash, and electrolyte abnormalities, fatigue, multiorgan failure, and possibly death.  H.  I will continue to monitor the patient blood work including blood counts kidney function liver function and electrolytes.    2. Lambert-Eaton syndrome:  A. She will continue Firdapse 10 mg 4 times daily as prescribed by neurology.  B.  I do think this should be a contraindication for her immunotherapy since chemotherapy with treating the underlying cause for her neurologic syndrome.  C.  They are considering plasma exchange once chemotherapy has completed.    3. Hypothyroid:  A. She will continue Synthroid daily    4.  Poor systolic cardiac function:  A.  Status post defibrillator placement.    5.  Dizziness:  A.  MRI on 2/12/2024 showed negative for metastatic disease.    6.  Chemotherapy-induced nausea:  A.  I will continue Zofran as needed.    FOLLOW UP: 3 weeks with cycle 4.      Susi Irving  MD  7/3/2024

## 2024-07-15 ENCOUNTER — TELEPHONE (OUTPATIENT)
Dept: NUTRITION | Facility: HOSPITAL | Age: 64
End: 2024-07-15
Payer: MEDICARE

## 2024-07-15 NOTE — PROGRESS NOTES
Adult Outpatient Nutrition  Assessment    Patient Name:  Gayatri Kahn  YOB: 1960  MRN: [id number]    Assessment Date:  7/15/2024    Comments:  Called pt regarding nutrition oncology f/up. Pt declined having any wt loss. She reports eating well and having a great appetite. Pt did not have any nutrition questions/concerns at this time. RD to f/up in 1 month.    Electronically signed by:  Veronika Yusuf RD  07/15/24 13:54 EDT

## 2024-07-29 ENCOUNTER — HOSPITAL ENCOUNTER (OUTPATIENT)
Dept: CT IMAGING | Facility: HOSPITAL | Age: 64
Discharge: HOME | End: 2024-07-29
Admitting: INTERNAL MEDICINE
Payer: MEDICARE

## 2024-07-29 DIAGNOSIS — C34.31 SMALL CELL LUNG CANCER, RIGHT LOWER LOBE: ICD-10-CM

## 2024-07-29 PROCEDURE — 71260 CT THORAX DX C+: CPT

## 2024-07-29 PROCEDURE — 70491 CT SOFT TISSUE NECK W/DYE: CPT

## 2024-07-29 PROCEDURE — 25510000001 IOPAMIDOL 61 % SOLUTION: Performed by: INTERNAL MEDICINE

## 2024-07-29 PROCEDURE — 74177 CT ABD & PELVIS W/CONTRAST: CPT

## 2024-07-29 RX ADMIN — IOPAMIDOL 85 ML: 612 INJECTION, SOLUTION INTRAVENOUS at 14:27

## 2024-07-31 ENCOUNTER — INFUSION (OUTPATIENT)
Dept: ONCOLOGY | Facility: HOSPITAL | Age: 64
End: 2024-07-31
Payer: MEDICARE

## 2024-07-31 ENCOUNTER — OFFICE VISIT (OUTPATIENT)
Dept: ONCOLOGY | Facility: CLINIC | Age: 64
End: 2024-07-31
Payer: MEDICARE

## 2024-07-31 VITALS
HEART RATE: 83 BPM | SYSTOLIC BLOOD PRESSURE: 100 MMHG | BODY MASS INDEX: 23.3 KG/M2 | DIASTOLIC BLOOD PRESSURE: 60 MMHG | RESPIRATION RATE: 16 BRPM | OXYGEN SATURATION: 96 % | TEMPERATURE: 98.4 F | WEIGHT: 145 LBS | HEIGHT: 66 IN

## 2024-07-31 DIAGNOSIS — C34.31 SMALL CELL LUNG CANCER, RIGHT LOWER LOBE: Primary | ICD-10-CM

## 2024-07-31 DIAGNOSIS — C77.2 SECONDARY MALIGNANT NEOPLASM OF INTRA-ABDOMINAL LYMPH NODES: ICD-10-CM

## 2024-07-31 LAB
ALBUMIN SERPL-MCNC: 4.1 G/DL (ref 3.5–5.2)
ALBUMIN/GLOB SERPL: 1.3 G/DL
ALP SERPL-CCNC: 143 U/L (ref 39–117)
ALT SERPL W P-5'-P-CCNC: 19 U/L (ref 1–33)
ANION GAP SERPL CALCULATED.3IONS-SCNC: 10.7 MMOL/L (ref 5–15)
AST SERPL-CCNC: 27 U/L (ref 1–32)
BASOPHILS # BLD AUTO: 0.02 10*3/MM3 (ref 0–0.2)
BASOPHILS NFR BLD AUTO: 0.4 % (ref 0–1.5)
BILIRUB SERPL-MCNC: 0.2 MG/DL (ref 0–1.2)
BUN SERPL-MCNC: 10 MG/DL (ref 8–23)
BUN/CREAT SERPL: 13.7 (ref 7–25)
CALCIUM SPEC-SCNC: 9.7 MG/DL (ref 8.6–10.5)
CHLORIDE SERPL-SCNC: 102 MMOL/L (ref 98–107)
CO2 SERPL-SCNC: 27.3 MMOL/L (ref 22–29)
CREAT SERPL-MCNC: 0.73 MG/DL (ref 0.57–1)
DEPRECATED RDW RBC AUTO: 41.9 FL (ref 37–54)
EGFRCR SERPLBLD CKD-EPI 2021: 92.5 ML/MIN/1.73
EOSINOPHIL # BLD AUTO: 0.06 10*3/MM3 (ref 0–0.4)
EOSINOPHIL NFR BLD AUTO: 1.2 % (ref 0.3–6.2)
ERYTHROCYTE [DISTWIDTH] IN BLOOD BY AUTOMATED COUNT: 12.6 % (ref 12.3–15.4)
GLOBULIN UR ELPH-MCNC: 3.1 GM/DL
GLUCOSE SERPL-MCNC: 104 MG/DL (ref 65–99)
HCT VFR BLD AUTO: 38 % (ref 34–46.6)
HGB BLD-MCNC: 12.6 G/DL (ref 12–15.9)
IMM GRANULOCYTES # BLD AUTO: 0.03 10*3/MM3 (ref 0–0.05)
IMM GRANULOCYTES NFR BLD AUTO: 0.6 % (ref 0–0.5)
LYMPHOCYTES # BLD AUTO: 1.44 10*3/MM3 (ref 0.7–3.1)
LYMPHOCYTES NFR BLD AUTO: 28.6 % (ref 19.6–45.3)
MCH RBC QN AUTO: 30.1 PG (ref 26.6–33)
MCHC RBC AUTO-ENTMCNC: 33.2 G/DL (ref 31.5–35.7)
MCV RBC AUTO: 90.9 FL (ref 79–97)
MONOCYTES # BLD AUTO: 0.45 10*3/MM3 (ref 0.1–0.9)
MONOCYTES NFR BLD AUTO: 8.9 % (ref 5–12)
NEUTROPHILS NFR BLD AUTO: 3.04 10*3/MM3 (ref 1.7–7)
NEUTROPHILS NFR BLD AUTO: 60.3 % (ref 42.7–76)
NRBC BLD AUTO-RTO: 0 /100 WBC (ref 0–0.2)
PLATELET # BLD AUTO: 230 10*3/MM3 (ref 140–450)
PMV BLD AUTO: 9.3 FL (ref 6–12)
POTASSIUM SERPL-SCNC: 4.4 MMOL/L (ref 3.5–5.2)
PROT SERPL-MCNC: 7.2 G/DL (ref 6–8.5)
RBC # BLD AUTO: 4.18 10*6/MM3 (ref 3.77–5.28)
SODIUM SERPL-SCNC: 140 MMOL/L (ref 136–145)
T4 FREE SERPL-MCNC: 1.39 NG/DL (ref 0.92–1.68)
TSH SERPL DL<=0.05 MIU/L-ACNC: 0.47 UIU/ML (ref 0.27–4.2)
WBC NRBC COR # BLD AUTO: 5.04 10*3/MM3 (ref 3.4–10.8)

## 2024-07-31 PROCEDURE — 84443 ASSAY THYROID STIM HORMONE: CPT

## 2024-07-31 PROCEDURE — 99214 OFFICE O/P EST MOD 30 MIN: CPT | Performed by: INTERNAL MEDICINE

## 2024-07-31 PROCEDURE — 84439 ASSAY OF FREE THYROXINE: CPT

## 2024-07-31 PROCEDURE — 96413 CHEMO IV INFUSION 1 HR: CPT

## 2024-07-31 PROCEDURE — 25810000003 SODIUM CHLORIDE 0.9 % SOLUTION 250 ML FLEX CONT: Performed by: INTERNAL MEDICINE

## 2024-07-31 PROCEDURE — 80053 COMPREHEN METABOLIC PANEL: CPT

## 2024-07-31 PROCEDURE — 25010000002 DURVALUMAB 50 MG/ML SOLUTION 10 ML VIAL: Performed by: INTERNAL MEDICINE

## 2024-07-31 PROCEDURE — 1126F AMNT PAIN NOTED NONE PRSNT: CPT | Performed by: INTERNAL MEDICINE

## 2024-07-31 PROCEDURE — 36415 COLL VENOUS BLD VENIPUNCTURE: CPT

## 2024-07-31 PROCEDURE — 85025 COMPLETE CBC W/AUTO DIFF WBC: CPT

## 2024-07-31 RX ORDER — POTASSIUM CHLORIDE 750 MG/1
TABLET, FILM COATED, EXTENDED RELEASE ORAL
COMMUNITY

## 2024-07-31 RX ORDER — LEVOTHYROXINE SODIUM 0.07 MG/1
75 TABLET ORAL DAILY
COMMUNITY
Start: 2024-07-22 | End: 2025-07-22

## 2024-07-31 RX ORDER — SODIUM CHLORIDE 9 MG/ML
20 INJECTION, SOLUTION INTRAVENOUS ONCE
Status: CANCELLED | OUTPATIENT
Start: 2024-08-01

## 2024-07-31 RX ORDER — SODIUM CHLORIDE 9 MG/ML
20 INJECTION, SOLUTION INTRAVENOUS ONCE
Status: DISCONTINUED | OUTPATIENT
Start: 2024-07-31 | End: 2024-07-31 | Stop reason: HOSPADM

## 2024-07-31 RX ADMIN — SODIUM CHLORIDE 1500 MG: 9 INJECTION, SOLUTION INTRAVENOUS at 14:50

## 2024-07-31 NOTE — PROGRESS NOTES
DATE OF VISIT: 7/31/2024    REASON FOR VISIT: Followup for right lower lobe small cell lung cancer     PROBLEM LIST:  1. Right lower lobe small cell lung cancer T1 N1 M0 stage IIb:  A.  Presented with ataxia and lower extremities weakness  B.  Negative bronchoscopy August 19, 2020 and endobronchial ultrasound September 9, 2020  C.  CT chest done December 7, 2020 revealed 2.7 cm right hilar mass  D.  Whole-body PET scan done January 11, 2020 revealed isolated hypermetabolic activity at the right hilar mass SUV of 12  E. positive serology for AGN A-1 antibody which is 92% predicted for small cell lung cancer, positive N type calcium channel blocker and P/Q type calcium channel blooker.  F.  Started chest radiation February 26, 2021, completed end of March 2021  G.  Repeated PET scan done June 08, 2023 revealed complete resolution of activity in the chest however new portacaval hypermetabolic mass.  Biopsy-proven small cell cancer July 12, 2023  H.  Treated with definitive radiotherapy, completed July 2023.  I.  Progressive disease with worsening para-aortic lymphadenopathy document CT scans in February 1, 2024.  J.  Started chemotherapy with carboplatin etoposide and Imfinzi February 2024, Status post 4 cycles.  K.  Started maintenance Imfinzi May 8, 2024.  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  Hypertension  4.  Hypercholesteremia      HISTORY OF PRESENT ILLNESS: The patient is a very pleasant 64 y.o. female  with past medical history significant for suspected small cell lung cancer of the right lung. The patient never had a positive biopsy however her serology was suggestive of SCLCA after presenting with Lambert-Eaton syndrome. The patient is here today for scheduled follow up visit with treatment maintenance therapy, Imfinzi cycle #4.      SUBJECTIVE: Gayatri is here today with her .  She still complains of dizziness.  Her breathing is stable.  She is anxious about  the scan results.    Past History:  Medical History: has a past medical history of Arthritis, Disease of thyroid gland, Heart disease, History of radiation therapy (08/11/2023), Hyperlipidemia, Lambert-Eaton syndrome, Lung cancer (02/2021), Metastatic cancer, Pneumonia, and Requires supplemental oxygen.   Surgical History: has a past surgical history that includes Hysterectomy; Foot surgery; and Knee surgery.   Family History: family history includes Brain cancer in her father; Breast cancer in her sister; Heart disease in her brother, mother, and sister; Kidney disease in her mother.   Social History: reports that she quit smoking about 2 years ago. Her smoking use included cigarettes. She started smoking about 17 years ago. She has a 15 pack-year smoking history. She has never used smokeless tobacco. She reports that she does not drink alcohol and does not use drugs.    (Not in a hospital admission)     Allergies: Erythromycin     Review of Systems   Constitutional:  Positive for fatigue.   Respiratory: Negative.     Gastrointestinal: Negative.    Musculoskeletal:  Positive for gait problem.   Neurological:  Positive for dizziness and weakness.         Current Outpatient Medications:     albuterol sulfate  (90 Base) MCG/ACT inhaler, Inhale 2 puffs Every 4 (Four) Hours As Needed., Disp: , Rfl:     Amifampridine Phosphate (Firdapse) 10 MG tablet, Take 2 tablets by mouth 4 (Four) Times a Day., Disp: , Rfl:     aspirin 81 MG EC tablet, Take 1 tablet by mouth Daily. 7-6-23 last dose, Disp: , Rfl:     atorvastatin (LIPITOR) 20 MG tablet, Take 1 tablet by mouth Every Night., Disp: , Rfl:     budesonide (PULMICORT) 0.5 MG/2ML nebulizer solution, , Disp: , Rfl:     cephalexin (KEFLEX) 500 MG capsule, , Disp: , Rfl:     furosemide (LASIX) 40 MG tablet, Take 1 tablet by mouth Daily., Disp: , Rfl:     ipratropium-albuterol (DUO-NEB) 0.5-2.5 mg/3 ml nebulizer, , Disp: , Rfl:     levalbuterol (XOPENEX) 1.25 MG/3ML  "nebulizer solution, , Disp: , Rfl:     levothyroxine (SYNTHROID, LEVOTHROID) 75 MCG tablet, Take 1 tablet by mouth Daily., Disp: , Rfl:     metoprolol tartrate (LOPRESSOR) 50 MG tablet, Take 0.5 tablets by mouth Daily., Disp: , Rfl:     mupirocin (BACTROBAN) 2 % ointment, , Disp: , Rfl:     ondansetron (ZOFRAN) 8 MG tablet, Take 1 tablet by mouth 3 (Three) Times a Day As Needed for Nausea or Vomiting., Disp: 30 tablet, Rfl: 5    potassium chloride (K-DUR,KLOR-CON) 20 MEQ CR tablet, Take 1 tablet by mouth Daily., Disp: , Rfl:     spironolactone (ALDACTONE) 25 MG tablet, Take 1 tablet by mouth Daily., Disp: , Rfl:     potassium chloride 10 MEQ CR tablet, , Disp: , Rfl:     PHYSICAL EXAMINATION:   /60   Pulse 83   Temp 98.4 °F (36.9 °C)   Resp 16   Ht 167.6 cm (65.98\")   Wt 65.8 kg (145 lb)   SpO2 96%   BMI 23.41 kg/m²    Pain Score    07/31/24 1254   PainSc: 0-No pain                          ECOG Performance Status: 2 - Symptomatic, <50% confined to bed  General Appearance:  alert, cooperative, no apparent distress and appears stated age   Neurologic/Psychiatric: A&O x 3, gait steady, appropriate affect, strength 5/5 in all muscle groups   HEENT:  Normocephalic, without obvious abnormality, mucous membranes moist   Neck: Supple, symmetrical, trachea midline, no adenopathy;  No thyromegaly, masses, or tenderness   Lungs:   Clear to auscultation bilaterally; respirations regular, even, and unlabored bilaterally   Heart:  Regular rate and rhythm, no murmurs appreciated   Abdomen:   Soft, non-tender, and non-distended   Lymph nodes: No cervical, supraclavicular, inguinal or axillary adenopathy noted   Extremities: Normal, atraumatic; no clubbing, cyanosis, or edema    Skin: No rashes, ulcers, or suspicious lesions noted     No visits with results within 2 Week(s) from this visit.   Latest known visit with results is:   Infusion on 07/03/2024   Component Date Value Ref Range Status    Glucose 07/03/2024 118 " (H)  65 - 99 mg/dL Final    BUN 07/03/2024 8  8 - 23 mg/dL Final    Creatinine 07/03/2024 0.71  0.57 - 1.00 mg/dL Final    Sodium 07/03/2024 138  136 - 145 mmol/L Final    Potassium 07/03/2024 4.5  3.5 - 5.2 mmol/L Final    Chloride 07/03/2024 102  98 - 107 mmol/L Final    CO2 07/03/2024 24.8  22.0 - 29.0 mmol/L Final    Calcium 07/03/2024 10.0  8.6 - 10.5 mg/dL Final    Total Protein 07/03/2024 7.3  6.0 - 8.5 g/dL Final    Albumin 07/03/2024 4.1  3.5 - 5.2 g/dL Final    ALT (SGPT) 07/03/2024 19  1 - 33 U/L Final    AST (SGOT) 07/03/2024 24  1 - 32 U/L Final    Alkaline Phosphatase 07/03/2024 169 (H)  39 - 117 U/L Final    Total Bilirubin 07/03/2024 0.3  0.0 - 1.2 mg/dL Final    Globulin 07/03/2024 3.2  gm/dL Final    A/G Ratio 07/03/2024 1.3  g/dL Final    BUN/Creatinine Ratio 07/03/2024 11.3  7.0 - 25.0 Final    Anion Gap 07/03/2024 11.2  5.0 - 15.0 mmol/L Final    eGFR 07/03/2024 95.7  >60.0 mL/min/1.73 Final    TSH 07/03/2024 0.171 (L)  0.270 - 4.200 uIU/mL Final    Free T4 07/03/2024 1.64  0.92 - 1.68 ng/dL Final    WBC 07/03/2024 5.90  3.40 - 10.80 10*3/mm3 Final    RBC 07/03/2024 3.99  3.77 - 5.28 10*6/mm3 Final    Hemoglobin 07/03/2024 12.7  12.0 - 15.9 g/dL Final    Hematocrit 07/03/2024 36.8  34.0 - 46.6 % Final    MCV 07/03/2024 92.2  79.0 - 97.0 fL Final    MCH 07/03/2024 31.8  26.6 - 33.0 pg Final    MCHC 07/03/2024 34.5  31.5 - 35.7 g/dL Final    RDW 07/03/2024 12.3  12.3 - 15.4 % Final    RDW-SD 07/03/2024 41.5  37.0 - 54.0 fl Final    MPV 07/03/2024 10.0  6.0 - 12.0 fL Final    Platelets 07/03/2024 266  140 - 450 10*3/mm3 Final    Neutrophil % 07/03/2024 74.1  42.7 - 76.0 % Final    Lymphocyte % 07/03/2024 16.6 (L)  19.6 - 45.3 % Final    Monocyte % 07/03/2024 7.8  5.0 - 12.0 % Final    Eosinophil % 07/03/2024 0.8  0.3 - 6.2 % Final    Basophil % 07/03/2024 0.5  0.0 - 1.5 % Final    Immature Grans % 07/03/2024 0.2  0.0 - 0.5 % Final    Neutrophils, Absolute 07/03/2024 4.37  1.70 - 7.00 10*3/mm3 Final     Lymphocytes, Absolute 07/03/2024 0.98  0.70 - 3.10 10*3/mm3 Final    Monocytes, Absolute 07/03/2024 0.46  0.10 - 0.90 10*3/mm3 Final    Eosinophils, Absolute 07/03/2024 0.05  0.00 - 0.40 10*3/mm3 Final    Basophils, Absolute 07/03/2024 0.03  0.00 - 0.20 10*3/mm3 Final    Immature Grans, Absolute 07/03/2024 0.01  0.00 - 0.05 10*3/mm3 Final    nRBC 07/03/2024 0.0  0.0 - 0.2 /100 WBC Final        CT Chest With Contrast Diagnostic    Result Date: 7/31/2024  Narrative: CT CHEST W CONTRAST DIAGNOSTIC, CT ABDOMEN PELVIS W CONTRAST Date of Exam: 7/29/2024 1:54 PM EDT Indication: f/u scans. Comparison: CT chest abdomen pelvis 4/15/2024 Technique: Axial CT images were obtained of the chest, abdomen, and pelvis after the uneventful intravenous administration of 85 mL Isovue-300.  Reconstructed coronal and sagittal images were also obtained. Automated exposure control and iterative construction methods were used. Findings: Chest: Unremarkable appearance of the great vessels. Unremarkable appearance of the cardiac chambers. There are trace coronary artery calcifications. No pericardial effusion. No bulky or suspicious thoracic lymph nodes. Unremarkable appearance of the chest wall  soft tissues. Normal appearance of the airways. Redemonstration of mild to moderate centrilobular emphysema. Similar architectural distortion, scarring, and poorly delineated soft tissue density at the right hilum which is overall stable, without evidence of distinct mass lesion in this region. Stable size and appearance of a 7 mm subpleural subsolid nodule in the posterior right lower lobe (series 1 image 48). Stable size and appearance of a curvilinear subpleural 7 mm nodule in the right middle  lobe (series 1 image 62). No new or enlarging or clearly suspicious pulmonary nodules. No pleural fluid. No pneumothorax. No focal bony lesion. No acute osseous findings. Abdomen and pelvis: No focal liver lesion. Normal appearance of the gallbladder and  bile ducts. Normal size and appearance of the spleen. Normal appearance of the pancreas. Stable small ill-defined left adrenal nodule. The right adrenal gland is unremarkable. Normal appearance of the kidneys, ureters, and bladder. The uterus is surgically absent. No bowel obstruction or inflammatory change of the GI tract. Unremarkable appearance of the GI tract. No abdominopelvic free fluid. No abdominopelvic fat stranding. No pneumoperitoneum. No discrete peritoneal or omental or visceral deposit. Unremarkable appearance of the vasculature. No bulky or suspicious abdominopelvic lymph nodes. No acute or suspicious bony findings.     Impression: Impression: No definite evidence of metastatic disease in the chest, abdomen, or pelvis. There is unchanged appearance of scarring/treatment change at the right hilum. Stable small left adrenal nodule, nonspecific. Electronically Signed: Demian Caro MD  7/31/2024 12:41 PM EDT  Workstation ID: [id number]    CT Abdomen Pelvis With Contrast    Result Date: 7/31/2024  Narrative: CT CHEST W CONTRAST DIAGNOSTIC, CT ABDOMEN PELVIS W CONTRAST Date of Exam: 7/29/2024 1:54 PM EDT Indication: f/u scans. Comparison: CT chest abdomen pelvis 4/15/2024 Technique: Axial CT images were obtained of the chest, abdomen, and pelvis after the uneventful intravenous administration of 85 mL Isovue-300.  Reconstructed coronal and sagittal images were also obtained. Automated exposure control and iterative construction methods were used. Findings: Chest: Unremarkable appearance of the great vessels. Unremarkable appearance of the cardiac chambers. There are trace coronary artery calcifications. No pericardial effusion. No bulky or suspicious thoracic lymph nodes. Unremarkable appearance of the chest wall  soft tissues. Normal appearance of the airways. Redemonstration of mild to moderate centrilobular emphysema. Similar architectural distortion, scarring, and poorly delineated soft tissue density  at the right hilum which is overall stable, without evidence of distinct mass lesion in this region. Stable size and appearance of a 7 mm subpleural subsolid nodule in the posterior right lower lobe (series 1 image 48). Stable size and appearance of a curvilinear subpleural 7 mm nodule in the right middle  lobe (series 1 image 62). No new or enlarging or clearly suspicious pulmonary nodules. No pleural fluid. No pneumothorax. No focal bony lesion. No acute osseous findings. Abdomen and pelvis: No focal liver lesion. Normal appearance of the gallbladder and bile ducts. Normal size and appearance of the spleen. Normal appearance of the pancreas. Stable small ill-defined left adrenal nodule. The right adrenal gland is unremarkable. Normal appearance of the kidneys, ureters, and bladder. The uterus is surgically absent. No bowel obstruction or inflammatory change of the GI tract. Unremarkable appearance of the GI tract. No abdominopelvic free fluid. No abdominopelvic fat stranding. No pneumoperitoneum. No discrete peritoneal or omental or visceral deposit. Unremarkable appearance of the vasculature. No bulky or suspicious abdominopelvic lymph nodes. No acute or suspicious bony findings.     Impression: Impression: No definite evidence of metastatic disease in the chest, abdomen, or pelvis. There is unchanged appearance of scarring/treatment change at the right hilum. Stable small left adrenal nodule, nonspecific. Electronically Signed: Demian Caro MD  7/31/2024 12:41 PM EDT  Workstation ID: [id number]    CT Soft Tissue Neck With Contrast    Result Date: 7/30/2024  Narrative: CT SOFT TISSUE NECK W CONTRAST Date of Exam: 7/29/2024 1:54 PM EDT Indication: f/u scans. Comparison: 4/15/2024. Technique: Axial CT images were obtained of the neck after the uneventful intravenous administration of 85 mL Isovue-300.  Reconstructed coronal and sagittal images were also obtained. Automated exposure control and iterative  construction methods were used. Findings: Limited intracranial evaluation demonstrates no acute findings. The orbits are normal and the paranasal sinuses appear grossly clear. There is no evidence of aerodigestive tract mass or other focal luminal abnormality. Vascular structures redemonstrates some minimal atherosclerotic change without associated narrowing. The parotid and submandibular glands appear normal and symmetric. There is no distinct new or otherwise enlarging suspicious cervical lymphadenopathy present. The osseous structures demonstrate some mild spondylosis, otherwise without evidence of acute fracture or aggressive osseous lesion.     Impression: Impression: Stable CT appearance of the neck without evidence of metastatic involvement or other unexpected or acute finding. Electronically Signed: Ranjith Zarate MD  7/30/2024 9:40 AM EDT  Workstation ID: [id number]       ASSESSMENT: The patient is a very pleasant 64 y.o. female  with small cell lung cancer      PLAN:    1. Right lower lobe small cell lung cancer TxN2 M1b stage [place]:  A.  I will proceed with treatment as scheduled today maintenance immunotherapy Imfinzi cycle #4.  B.  She will follow-up with us in 4 weeks for cycle #5.  C. We reviewed potential side effects of immunotherapy including but not limited to immune mediated reactions with thyroiditis, pneumonitis, hepatitis, colitis, rash, and electrolyte abnormalities, fatigue, multiorgan failure, and possibly death.  D.  I will continue to monitor the patient blood work including blood counts kidney function liver function and electrolytes.  E.  I did go over the blood work results with the patient from July 30, 2024 and reassured no evidence of metastatic disease.  I will do 4 months follow-up study which should be due end of November 2024.    2. Lambert-Eaton syndrome:  A. She will continue Firdapse 10 mg 4 times daily as prescribed by neurology.  B.  I do think this should be a contraindication  for her immunotherapy since chemotherapy with treating the underlying cause for her neurologic syndrome.  C.  They are considering plasma exchange once chemotherapy has completed.    3. Hypothyroid:  A. She will continue Synthroid daily    4.  Poor systolic cardiac function:  A.  Status post defibrillator placement.    5.  Dizziness:  A.  MRI on 2/12/2024 showed negative for metastatic disease.    6.  Chemotherapy-induced nausea:  A.  I will continue Zofran as needed.    FOLLOW UP: 4 weeks with cycle 5.      Susi Irving MD  7/31/2024

## 2024-08-14 ENCOUNTER — TELEPHONE (OUTPATIENT)
Dept: NUTRITION | Facility: HOSPITAL | Age: 64
End: 2024-08-14
Payer: MEDICARE

## 2024-08-14 NOTE — PROGRESS NOTES
Adult Outpatient Nutrition  Assessment    Patient Name:  Gayatri Kahn  YOB: 1960  MRN: [id number]    Assessment Date:  8/14/2024    Comments:  RD called pt for nutrition oncology f/up. #. Pt reports not having any recent wt changes. Pt continues to eat well and had question on what foods are beneficial to eat during cancer treatment. RD reviewed beneficial foods. Pt had no other questions/concerns. RD will f/up in 1 month.    Electronically signed by:  Veronika Yusuf RD  08/14/24 12:25 EDT

## 2024-08-28 ENCOUNTER — OFFICE VISIT (OUTPATIENT)
Dept: ONCOLOGY | Facility: CLINIC | Age: 64
End: 2024-08-28
Payer: MEDICARE

## 2024-08-28 ENCOUNTER — HOSPITAL ENCOUNTER (OUTPATIENT)
Facility: HOSPITAL | Age: 64
Discharge: HOME | End: 2024-08-28
Admitting: NURSE PRACTITIONER
Payer: MEDICARE

## 2024-08-28 VITALS
RESPIRATION RATE: 16 BRPM | HEART RATE: 80 BPM | TEMPERATURE: 97.7 F | WEIGHT: 147 LBS | SYSTOLIC BLOOD PRESSURE: 127 MMHG | BODY MASS INDEX: 23.63 KG/M2 | OXYGEN SATURATION: 98 % | DIASTOLIC BLOOD PRESSURE: 57 MMHG | HEIGHT: 66 IN

## 2024-08-28 DIAGNOSIS — C34.31 SMALL CELL LUNG CANCER, RIGHT LOWER LOBE: Primary | ICD-10-CM

## 2024-08-28 DIAGNOSIS — C34.31 SMALL CELL LUNG CANCER, RIGHT LOWER LOBE: ICD-10-CM

## 2024-08-28 DIAGNOSIS — C77.2 SECONDARY MALIGNANT NEOPLASM OF INTRA-ABDOMINAL LYMPH NODES: ICD-10-CM

## 2024-08-28 DIAGNOSIS — C77.2 SECONDARY MALIGNANT NEOPLASM OF INTRA-ABDOMINAL LYMPH NODES: Primary | ICD-10-CM

## 2024-08-28 LAB
ALBUMIN SERPL-MCNC: 4.4 G/DL (ref 3.5–5.2)
ALBUMIN/GLOB SERPL: 1.5 G/DL
ALP SERPL-CCNC: 125 U/L (ref 39–117)
ALT SERPL W P-5'-P-CCNC: 28 U/L (ref 1–33)
ANION GAP SERPL CALCULATED.3IONS-SCNC: 10 MMOL/L (ref 5–15)
AST SERPL-CCNC: 32 U/L (ref 1–32)
BASOPHILS # BLD AUTO: 0.03 10*3/MM3 (ref 0–0.2)
BASOPHILS NFR BLD AUTO: 0.6 % (ref 0–1.5)
BILIRUB SERPL-MCNC: 0.3 MG/DL (ref 0–1.2)
BUN SERPL-MCNC: 9 MG/DL (ref 8–23)
BUN/CREAT SERPL: 12 (ref 7–25)
CALCIUM SPEC-SCNC: 10.3 MG/DL (ref 8.6–10.5)
CHLORIDE SERPL-SCNC: 101 MMOL/L (ref 98–107)
CO2 SERPL-SCNC: 26 MMOL/L (ref 22–29)
CREAT SERPL-MCNC: 0.75 MG/DL (ref 0.57–1)
DEPRECATED RDW RBC AUTO: 45.1 FL (ref 37–54)
EGFRCR SERPLBLD CKD-EPI 2021: 89.6 ML/MIN/1.73
EOSINOPHIL # BLD AUTO: 0.03 10*3/MM3 (ref 0–0.4)
EOSINOPHIL NFR BLD AUTO: 0.6 % (ref 0.3–6.2)
ERYTHROCYTE [DISTWIDTH] IN BLOOD BY AUTOMATED COUNT: 14 % (ref 12.3–15.4)
GLOBULIN UR ELPH-MCNC: 2.9 GM/DL
GLUCOSE SERPL-MCNC: 95 MG/DL (ref 65–99)
HCT VFR BLD AUTO: 38.2 % (ref 34–46.6)
HGB BLD-MCNC: 13 G/DL (ref 12–15.9)
IMM GRANULOCYTES # BLD AUTO: 0.01 10*3/MM3 (ref 0–0.05)
IMM GRANULOCYTES NFR BLD AUTO: 0.2 % (ref 0–0.5)
LYMPHOCYTES # BLD AUTO: 1.52 10*3/MM3 (ref 0.7–3.1)
LYMPHOCYTES NFR BLD AUTO: 30 % (ref 19.6–45.3)
MCH RBC QN AUTO: 30 PG (ref 26.6–33)
MCHC RBC AUTO-ENTMCNC: 34 G/DL (ref 31.5–35.7)
MCV RBC AUTO: 88.2 FL (ref 79–97)
MONOCYTES # BLD AUTO: 0.41 10*3/MM3 (ref 0.1–0.9)
MONOCYTES NFR BLD AUTO: 8.1 % (ref 5–12)
NEUTROPHILS NFR BLD AUTO: 3.07 10*3/MM3 (ref 1.7–7)
NEUTROPHILS NFR BLD AUTO: 60.5 % (ref 42.7–76)
NRBC BLD AUTO-RTO: 0 /100 WBC (ref 0–0.2)
PLATELET # BLD AUTO: 244 10*3/MM3 (ref 140–450)
PMV BLD AUTO: 10.3 FL (ref 6–12)
POTASSIUM SERPL-SCNC: 4.3 MMOL/L (ref 3.5–5.2)
PROT SERPL-MCNC: 7.3 G/DL (ref 6–8.5)
RBC # BLD AUTO: 4.33 10*6/MM3 (ref 3.77–5.28)
SODIUM SERPL-SCNC: 137 MMOL/L (ref 136–145)
T4 FREE SERPL-MCNC: 1.33 NG/DL (ref 0.92–1.68)
TSH SERPL DL<=0.05 MIU/L-ACNC: 2.39 UIU/ML (ref 0.27–4.2)
WBC NRBC COR # BLD AUTO: 5.07 10*3/MM3 (ref 3.4–10.8)

## 2024-08-28 PROCEDURE — 96413 CHEMO IV INFUSION 1 HR: CPT

## 2024-08-28 PROCEDURE — 84439 ASSAY OF FREE THYROXINE: CPT | Performed by: NURSE PRACTITIONER

## 2024-08-28 PROCEDURE — 25810000003 SODIUM CHLORIDE 0.9 % SOLUTION 250 ML FLEX CONT: Performed by: NURSE PRACTITIONER

## 2024-08-28 PROCEDURE — 1126F AMNT PAIN NOTED NONE PRSNT: CPT | Performed by: NURSE PRACTITIONER

## 2024-08-28 PROCEDURE — 1160F RVW MEDS BY RX/DR IN RCRD: CPT | Performed by: NURSE PRACTITIONER

## 2024-08-28 PROCEDURE — 85025 COMPLETE CBC W/AUTO DIFF WBC: CPT | Performed by: NURSE PRACTITIONER

## 2024-08-28 PROCEDURE — 84443 ASSAY THYROID STIM HORMONE: CPT | Performed by: NURSE PRACTITIONER

## 2024-08-28 PROCEDURE — 99214 OFFICE O/P EST MOD 30 MIN: CPT | Performed by: NURSE PRACTITIONER

## 2024-08-28 PROCEDURE — 80053 COMPREHEN METABOLIC PANEL: CPT | Performed by: NURSE PRACTITIONER

## 2024-08-28 PROCEDURE — 25010000002 DURVALUMAB 50 MG/ML SOLUTION 10 ML VIAL: Performed by: NURSE PRACTITIONER

## 2024-08-28 PROCEDURE — 1159F MED LIST DOCD IN RCRD: CPT | Performed by: NURSE PRACTITIONER

## 2024-08-28 RX ORDER — SODIUM CHLORIDE 9 MG/ML
20 INJECTION, SOLUTION INTRAVENOUS ONCE
Status: CANCELLED | OUTPATIENT
Start: 2024-08-29

## 2024-08-28 RX ORDER — SODIUM CHLORIDE 9 MG/ML
20 INJECTION, SOLUTION INTRAVENOUS ONCE
Status: DISCONTINUED | OUTPATIENT
Start: 2024-08-28 | End: 2024-08-29 | Stop reason: HOSPADM

## 2024-08-28 RX ADMIN — SODIUM CHLORIDE 1500 MG: 9 INJECTION, SOLUTION INTRAVENOUS at 12:29

## 2024-08-28 NOTE — PROGRESS NOTES
DATE OF VISIT: 8/28/2024    REASON FOR VISIT: Followup for right lower lobe small cell lung cancer     PROBLEM LIST:  1. Right lower lobe small cell lung cancer T1 N1 M0 stage IIb:  A.  Presented with ataxia and lower extremities weakness  B.  Negative bronchoscopy August 19, 2020 and endobronchial ultrasound September 9, 2020  C.  CT chest done December 7, 2020 revealed 2.7 cm right hilar mass  D.  Whole-body PET scan done January 11, 2020 revealed isolated hypermetabolic activity at the right hilar mass SUV of 12  E. positive serology for AGN A-1 antibody which is 92% predicted for small cell lung cancer, positive N type calcium channel blocker and P/Q type calcium channel blooker.  F.  Started chest radiation February 26, 2021, completed end of March 2021  G.  Repeated PET scan done June 08, 2023 revealed complete resolution of activity in the chest however new portacaval hypermetabolic mass.  Biopsy-proven small cell cancer July 12, 2023  H.  Treated with definitive radiotherapy, completed July 2023.  I.  Progressive disease with worsening para-aortic lymphadenopathy document CT scans in February 1, 2024.  J.  Started chemotherapy with carboplatin etoposide and Imfinzi February 2024, Status post 4 cycles.  K.  Started maintenance Imfinzi May 8, 2024.  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  Hypertension  4.  Hypercholesteremia      HISTORY OF PRESENT ILLNESS: The patient is a very pleasant 64 y.o. female  with past medical history significant for suspected small cell lung cancer of the right lung. The patient never had a positive biopsy however her serology was suggestive of SCLCA after presenting with Lambert-Eaton syndrome. The patient is here today for scheduled follow up visit with treatment maintenance therapy, Imfinzi cycle #5.      SUBJECTIVE: Gayatri is here today with her .  She still complains of dizziness that is stable.  Breathing is stable.  Denies  any fever, chills, night sweats.        Past History:  Medical History: has a past medical history of Arthritis, Disease of thyroid gland, Heart disease, History of radiation therapy (08/11/2023), Hyperlipidemia, Lambert-Eaton syndrome, Lung cancer (02/2021), Metastatic cancer, Pneumonia, and Requires supplemental oxygen.   Surgical History: has a past surgical history that includes Hysterectomy; Foot surgery; and Knee surgery.   Family History: family history includes Brain cancer in her father; Breast cancer in her sister; Heart disease in her brother, mother, and sister; Kidney disease in her mother.   Social History: reports that she quit smoking about 2 years ago. Her smoking use included cigarettes. She started smoking about 17 years ago. She has a 15 pack-year smoking history. She has never used smokeless tobacco. She reports that she does not drink alcohol and does not use drugs.    (Not in a hospital admission)     Allergies: Erythromycin     Review of Systems   Constitutional:  Positive for fatigue.   Respiratory: Negative.     Gastrointestinal: Negative.    Musculoskeletal:  Positive for gait problem.   Neurological:  Positive for dizziness and weakness.         Current Outpatient Medications:     albuterol sulfate  (90 Base) MCG/ACT inhaler, Inhale 2 puffs Every 4 (Four) Hours As Needed., Disp: , Rfl:     Amifampridine Phosphate (Firdapse) 10 MG tablet, Take 2 tablets by mouth 4 (Four) Times a Day., Disp: , Rfl:     aspirin 81 MG EC tablet, Take 1 tablet by mouth Daily. 7-6-23 last dose, Disp: , Rfl:     atorvastatin (LIPITOR) 20 MG tablet, Take 1 tablet by mouth Every Night., Disp: , Rfl:     budesonide (PULMICORT) 0.5 MG/2ML nebulizer solution, , Disp: , Rfl:     cephalexin (KEFLEX) 500 MG capsule, , Disp: , Rfl:     furosemide (LASIX) 40 MG tablet, Take 1 tablet by mouth Daily., Disp: , Rfl:     ipratropium-albuterol (DUO-NEB) 0.5-2.5 mg/3 ml nebulizer, , Disp: , Rfl:     levalbuterol (XOPENEX)  "1.25 MG/3ML nebulizer solution, , Disp: , Rfl:     levothyroxine (SYNTHROID, LEVOTHROID) 75 MCG tablet, Take 1 tablet by mouth Daily., Disp: , Rfl:     metoprolol tartrate (LOPRESSOR) 50 MG tablet, Take 0.5 tablets by mouth Daily., Disp: , Rfl:     mupirocin (BACTROBAN) 2 % ointment, , Disp: , Rfl:     ondansetron (ZOFRAN) 8 MG tablet, Take 1 tablet by mouth 3 (Three) Times a Day As Needed for Nausea or Vomiting., Disp: 30 tablet, Rfl: 5    potassium chloride (K-DUR,KLOR-CON) 20 MEQ CR tablet, Take 1 tablet by mouth Daily., Disp: , Rfl:     potassium chloride 10 MEQ CR tablet, , Disp: , Rfl:     spironolactone (ALDACTONE) 25 MG tablet, Take 1 tablet by mouth Daily., Disp: , Rfl:     PHYSICAL EXAMINATION:   /57   Pulse 80   Temp 97.7 °F (36.5 °C)   Resp 16   Ht 167.6 cm (65.98\")   Wt 66.7 kg (147 lb)   SpO2 98%   BMI 23.74 kg/m²    Pain Score    08/28/24 1018   PainSc: 0-No pain                            ECOG Performance Status: 2 - Symptomatic, <50% confined to bed  General Appearance:  alert, cooperative, no apparent distress and appears stated age   Neurologic/Psychiatric: A&O x 3, gait steady, appropriate affect, strength 5/5 in all muscle groups   HEENT:  Normocephalic, without obvious abnormality, mucous membranes moist   Neck: Supple, symmetrical, trachea midline, no adenopathy;  No thyromegaly, masses, or tenderness   Lungs:   Clear to auscultation bilaterally; respirations regular, even, and unlabored bilaterally   Heart:  Regular rate and rhythm, no murmurs appreciated   Abdomen:   Soft, non-tender, and non-distended   Lymph nodes: No cervical, supraclavicular, inguinal or axillary adenopathy noted   Extremities: Normal, atraumatic; no clubbing, cyanosis, or edema    Skin: No rashes, ulcers, or suspicious lesions noted     No visits with results within 2 Week(s) from this visit.   Latest known visit with results is:   Infusion on 07/31/2024   Component Date Value Ref Range Status    Free T4 " 07/31/2024 1.39  0.92 - 1.68 ng/dL Final    Glucose 07/31/2024 104 (H)  65 - 99 mg/dL Final    BUN 07/31/2024 10  8 - 23 mg/dL Final    Creatinine 07/31/2024 0.73  0.57 - 1.00 mg/dL Final    Sodium 07/31/2024 140  136 - 145 mmol/L Final    Potassium 07/31/2024 4.4  3.5 - 5.2 mmol/L Final    Chloride 07/31/2024 102  98 - 107 mmol/L Final    CO2 07/31/2024 27.3  22.0 - 29.0 mmol/L Final    Calcium 07/31/2024 9.7  8.6 - 10.5 mg/dL Final    Total Protein 07/31/2024 7.2  6.0 - 8.5 g/dL Final    Albumin 07/31/2024 4.1  3.5 - 5.2 g/dL Final    ALT (SGPT) 07/31/2024 19  1 - 33 U/L Final    AST (SGOT) 07/31/2024 27  1 - 32 U/L Final    Alkaline Phosphatase 07/31/2024 143 (H)  39 - 117 U/L Final    Total Bilirubin 07/31/2024 0.2  0.0 - 1.2 mg/dL Final    Globulin 07/31/2024 3.1  gm/dL Final    A/G Ratio 07/31/2024 1.3  g/dL Final    BUN/Creatinine Ratio 07/31/2024 13.7  7.0 - 25.0 Final    Anion Gap 07/31/2024 10.7  5.0 - 15.0 mmol/L Final    eGFR 07/31/2024 92.5  >60.0 mL/min/1.73 Final    TSH 07/31/2024 0.473  0.270 - 4.200 uIU/mL Final    WBC 07/31/2024 5.04  3.40 - 10.80 10*3/mm3 Final    RBC 07/31/2024 4.18  3.77 - 5.28 10*6/mm3 Final    Hemoglobin 07/31/2024 12.6  12.0 - 15.9 g/dL Final    Hematocrit 07/31/2024 38.0  34.0 - 46.6 % Final    MCV 07/31/2024 90.9  79.0 - 97.0 fL Final    MCH 07/31/2024 30.1  26.6 - 33.0 pg Final    MCHC 07/31/2024 33.2  31.5 - 35.7 g/dL Final    RDW 07/31/2024 12.6  12.3 - 15.4 % Final    RDW-SD 07/31/2024 41.9  37.0 - 54.0 fl Final    MPV 07/31/2024 9.3  6.0 - 12.0 fL Final    Platelets 07/31/2024 230  140 - 450 10*3/mm3 Final    Neutrophil % 07/31/2024 60.3  42.7 - 76.0 % Final    Lymphocyte % 07/31/2024 28.6  19.6 - 45.3 % Final    Monocyte % 07/31/2024 8.9  5.0 - 12.0 % Final    Eosinophil % 07/31/2024 1.2  0.3 - 6.2 % Final    Basophil % 07/31/2024 0.4  0.0 - 1.5 % Final    Immature Grans % 07/31/2024 0.6 (H)  0.0 - 0.5 % Final    Neutrophils, Absolute 07/31/2024 3.04  1.70 - 7.00  10*3/mm3 Final    Lymphocytes, Absolute 07/31/2024 1.44  0.70 - 3.10 10*3/mm3 Final    Monocytes, Absolute 07/31/2024 0.45  0.10 - 0.90 10*3/mm3 Final    Eosinophils, Absolute 07/31/2024 0.06  0.00 - 0.40 10*3/mm3 Final    Basophils, Absolute 07/31/2024 0.02  0.00 - 0.20 10*3/mm3 Final    Immature Grans, Absolute 07/31/2024 0.03  0.00 - 0.05 10*3/mm3 Final    nRBC 07/31/2024 0.0  0.0 - 0.2 /100 WBC Final        XR Chest 1 View    Result Date: 8/9/2024  Narrative: EXAMINATION TECHNIQUE: XR CHEST 1 VIEW PORTABLE / BEDSIDE CLINICAL HISTORY: Chest pain COMPARISON: 2/21/2024 FINDINGS: Emphysema. No dense consolidation. Bronchial wall thickening. Interstitial opacities appear chronic. No pneumothorax or pleural effusion. Normal heart size.    Impression: Possible bronchitis. Images personally reviewed, interpreted and dictated by DRAGAN Villafuerte M.D.    CT Chest With Contrast Diagnostic    Result Date: 7/31/2024  Narrative: CT CHEST W CONTRAST DIAGNOSTIC, CT ABDOMEN PELVIS W CONTRAST Date of Exam: 7/29/2024 1:54 PM EDT Indication: f/u scans. Comparison: CT chest abdomen pelvis 4/15/2024 Technique: Axial CT images were obtained of the chest, abdomen, and pelvis after the uneventful intravenous administration of 85 mL Isovue-300.  Reconstructed coronal and sagittal images were also obtained. Automated exposure control and iterative construction methods were used. Findings: Chest: Unremarkable appearance of the great vessels. Unremarkable appearance of the cardiac chambers. There are trace coronary artery calcifications. No pericardial effusion. No bulky or suspicious thoracic lymph nodes. Unremarkable appearance of the chest wall  soft tissues. Normal appearance of the airways. Redemonstration of mild to moderate centrilobular emphysema. Similar architectural distortion, scarring, and poorly delineated soft tissue density at the right hilum which is overall stable, without evidence of distinct mass lesion in this region.  Stable size and appearance of a 7 mm subpleural subsolid nodule in the posterior right lower lobe (series 1 image 48). Stable size and appearance of a curvilinear subpleural 7 mm nodule in the right middle  lobe (series 1 image 62). No new or enlarging or clearly suspicious pulmonary nodules. No pleural fluid. No pneumothorax. No focal bony lesion. No acute osseous findings. Abdomen and pelvis: No focal liver lesion. Normal appearance of the gallbladder and bile ducts. Normal size and appearance of the spleen. Normal appearance of the pancreas. Stable small ill-defined left adrenal nodule. The right adrenal gland is unremarkable. Normal appearance of the kidneys, ureters, and bladder. The uterus is surgically absent. No bowel obstruction or inflammatory change of the GI tract. Unremarkable appearance of the GI tract. No abdominopelvic free fluid. No abdominopelvic fat stranding. No pneumoperitoneum. No discrete peritoneal or omental or visceral deposit. Unremarkable appearance of the vasculature. No bulky or suspicious abdominopelvic lymph nodes. No acute or suspicious bony findings.     Impression: Impression: No definite evidence of metastatic disease in the chest, abdomen, or pelvis. There is unchanged appearance of scarring/treatment change at the right hilum. Stable small left adrenal nodule, nonspecific. Electronically Signed: Demian Caro MD  7/31/2024 12:41 PM EDT  Workstation ID: [id number]    CT Abdomen Pelvis With Contrast    Result Date: 7/31/2024  Narrative: CT CHEST W CONTRAST DIAGNOSTIC, CT ABDOMEN PELVIS W CONTRAST Date of Exam: 7/29/2024 1:54 PM EDT Indication: f/u scans. Comparison: CT chest abdomen pelvis 4/15/2024 Technique: Axial CT images were obtained of the chest, abdomen, and pelvis after the uneventful intravenous administration of 85 mL Isovue-300.  Reconstructed coronal and sagittal images were also obtained. Automated exposure control and iterative construction methods were used.  Findings: Chest: Unremarkable appearance of the great vessels. Unremarkable appearance of the cardiac chambers. There are trace coronary artery calcifications. No pericardial effusion. No bulky or suspicious thoracic lymph nodes. Unremarkable appearance of the chest wall  soft tissues. Normal appearance of the airways. Redemonstration of mild to moderate centrilobular emphysema. Similar architectural distortion, scarring, and poorly delineated soft tissue density at the right hilum which is overall stable, without evidence of distinct mass lesion in this region. Stable size and appearance of a 7 mm subpleural subsolid nodule in the posterior right lower lobe (series 1 image 48). Stable size and appearance of a curvilinear subpleural 7 mm nodule in the right middle  lobe (series 1 image 62). No new or enlarging or clearly suspicious pulmonary nodules. No pleural fluid. No pneumothorax. No focal bony lesion. No acute osseous findings. Abdomen and pelvis: No focal liver lesion. Normal appearance of the gallbladder and bile ducts. Normal size and appearance of the spleen. Normal appearance of the pancreas. Stable small ill-defined left adrenal nodule. The right adrenal gland is unremarkable. Normal appearance of the kidneys, ureters, and bladder. The uterus is surgically absent. No bowel obstruction or inflammatory change of the GI tract. Unremarkable appearance of the GI tract. No abdominopelvic free fluid. No abdominopelvic fat stranding. No pneumoperitoneum. No discrete peritoneal or omental or visceral deposit. Unremarkable appearance of the vasculature. No bulky or suspicious abdominopelvic lymph nodes. No acute or suspicious bony findings.     Impression: Impression: No definite evidence of metastatic disease in the chest, abdomen, or pelvis. There is unchanged appearance of scarring/treatment change at the right hilum. Stable small left adrenal nodule, nonspecific. Electronically Signed: Demian Caro MD   7/31/2024 12:41 PM EDT  Workstation ID: [id number]    CT Soft Tissue Neck With Contrast    Result Date: 7/30/2024  Narrative: CT SOFT TISSUE NECK W CONTRAST Date of Exam: 7/29/2024 1:54 PM EDT Indication: f/u scans. Comparison: 4/15/2024. Technique: Axial CT images were obtained of the neck after the uneventful intravenous administration of 85 mL Isovue-300.  Reconstructed coronal and sagittal images were also obtained. Automated exposure control and iterative construction methods were used. Findings: Limited intracranial evaluation demonstrates no acute findings. The orbits are normal and the paranasal sinuses appear grossly clear. There is no evidence of aerodigestive tract mass or other focal luminal abnormality. Vascular structures redemonstrates some minimal atherosclerotic change without associated narrowing. The parotid and submandibular glands appear normal and symmetric. There is no distinct new or otherwise enlarging suspicious cervical lymphadenopathy present. The osseous structures demonstrate some mild spondylosis, otherwise without evidence of acute fracture or aggressive osseous lesion.     Impression: Impression: Stable CT appearance of the neck without evidence of metastatic involvement or other unexpected or acute finding. Electronically Signed: Ranjith Zarate MD  7/30/2024 9:40 AM EDT  Workstation ID: [id number]       ASSESSMENT: The patient is a very pleasant 64 y.o. female  with small cell lung cancer      PLAN:    1. Right lower lobe small cell lung cancer TxN2 M1b stage [place]:  A.  I will proceed with treatment as scheduled today maintenance immunotherapy Imfinzi cycle #5.  B.  She will follow-up with us in 4 weeks for cycle #6.  C. We reviewed potential side effects of immunotherapy including but not limited to immune mediated reactions with thyroiditis, pneumonitis, hepatitis, colitis, rash, and electrolyte abnormalities, fatigue, multiorgan failure, and possibly death.  D.  I will continue to  monitor the patient blood work including blood counts kidney function liver function and electrolytes.  E.  I will do 4 months follow-up study which should be due end of November 2024.    2. Lambert-Eaton syndrome:  A. She will continue Firdapse 10 mg 4 times daily as prescribed by neurology.  B.  I do think this should be a contraindication for her immunotherapy since chemotherapy with treating the underlying cause for her neurologic syndrome.  C.  They are considering plasma exchange once chemotherapy has completed.    3. Hypothyroid:  A. She will continue Synthroid daily    4.  Poor systolic cardiac function:  A.  Status post defibrillator placement.    5.  Dizziness:  A.  MRI on 2/12/2024 showed negative for metastatic disease.    6.  Chemotherapy-induced nausea:  A.  I will continue Zofran as needed.    FOLLOW UP: 4 weeks with cycle 6.      Mary Carmen Hoover, APRN  8/28/2024

## 2024-09-19 ENCOUNTER — APPOINTMENT (OUTPATIENT)
Dept: GENERAL RADIOLOGY | Facility: HOSPITAL | Age: 64
DRG: 178 | End: 2024-09-19
Payer: MEDICARE

## 2024-09-19 ENCOUNTER — HOSPITAL ENCOUNTER (INPATIENT)
Facility: HOSPITAL | Age: 64
LOS: 1 days | Discharge: HOME | DRG: 178 | End: 2024-09-20
Attending: STUDENT IN AN ORGANIZED HEALTH CARE EDUCATION/TRAINING PROGRAM | Admitting: INTERNAL MEDICINE
Payer: MEDICARE

## 2024-09-19 ENCOUNTER — APPOINTMENT (OUTPATIENT)
Dept: CT IMAGING | Facility: HOSPITAL | Age: 64
DRG: 178 | End: 2024-09-19
Payer: MEDICARE

## 2024-09-19 DIAGNOSIS — J96.21 ACUTE ON CHRONIC RESPIRATORY FAILURE WITH HYPOXIA: ICD-10-CM

## 2024-09-19 DIAGNOSIS — U07.1 COVID-19: Primary | ICD-10-CM

## 2024-09-19 PROBLEM — J96.11 CHRONIC RESPIRATORY FAILURE WITH HYPOXIA: Status: ACTIVE | Noted: 2024-09-19

## 2024-09-19 LAB
A-A DO2: 37.4 MMHG
ALBUMIN SERPL-MCNC: 4.3 G/DL (ref 3.5–5.2)
ALBUMIN/GLOB SERPL: 1.7 G/DL
ALP SERPL-CCNC: 122 U/L (ref 39–117)
ALT SERPL W P-5'-P-CCNC: 24 U/L (ref 1–33)
ANION GAP SERPL CALCULATED.3IONS-SCNC: 10.8 MMOL/L (ref 5–15)
ARTERIAL PATENCY WRIST A: ABNORMAL
AST SERPL-CCNC: 26 U/L (ref 1–32)
ATMOSPHERIC PRESS: 731 MMHG
BASE EXCESS BLDA CALC-SCNC: 3.6 MMOL/L (ref 0–2)
BASOPHILS # BLD AUTO: 0.02 10*3/MM3 (ref 0–0.2)
BASOPHILS NFR BLD AUTO: 0.3 % (ref 0–1.5)
BDY SITE: ABNORMAL
BILIRUB SERPL-MCNC: 0.3 MG/DL (ref 0–1.2)
BILIRUB UR QL STRIP: NEGATIVE
BUN SERPL-MCNC: 11 MG/DL (ref 8–23)
BUN/CREAT SERPL: 12.9 (ref 7–25)
CALCIUM SPEC-SCNC: 10.2 MG/DL (ref 8.6–10.5)
CHLORIDE SERPL-SCNC: 97 MMOL/L (ref 98–107)
CLARITY UR: CLEAR
CO2 SERPL-SCNC: 28.2 MMOL/L (ref 22–29)
COHGB MFR BLD: 0.7 % (ref 0–2)
COLOR UR: YELLOW
CREAT SERPL-MCNC: 0.85 MG/DL (ref 0.57–1)
D DIMER PPP FEU-MCNC: 0.45 MCGFEU/ML (ref 0–0.63)
DEPRECATED RDW RBC AUTO: 48.2 FL (ref 37–54)
EGFRCR SERPLBLD CKD-EPI 2021: 77.1 ML/MIN/1.73
EOSINOPHIL # BLD AUTO: 0.02 10*3/MM3 (ref 0–0.4)
EOSINOPHIL NFR BLD AUTO: 0.3 % (ref 0.3–6.2)
ERYTHROCYTE [DISTWIDTH] IN BLOOD BY AUTOMATED COUNT: 14.9 % (ref 12.3–15.4)
FLUAV RNA RESP QL NAA+PROBE: NOT DETECTED
FLUBV RNA RESP QL NAA+PROBE: NOT DETECTED
GLOBULIN UR ELPH-MCNC: 2.6 GM/DL
GLUCOSE SERPL-MCNC: 80 MG/DL (ref 65–99)
GLUCOSE UR STRIP-MCNC: NEGATIVE MG/DL
HCO3 BLDA-SCNC: 27.9 MMOL/L (ref 22–28)
HCT VFR BLD AUTO: 37.1 % (ref 34–46.6)
HCT VFR BLD CALC: 35 %
HGB BLD-MCNC: 12.2 G/DL (ref 12–15.9)
HGB UR QL STRIP.AUTO: NEGATIVE
HOLD SPECIMEN: NORMAL
HOLD SPECIMEN: NORMAL
IMM GRANULOCYTES # BLD AUTO: 0.02 10*3/MM3 (ref 0–0.05)
IMM GRANULOCYTES NFR BLD AUTO: 0.3 % (ref 0–0.5)
KETONES UR QL STRIP: NEGATIVE
LEUKOCYTE ESTERASE UR QL STRIP.AUTO: NEGATIVE
LYMPHOCYTES # BLD AUTO: 0.82 10*3/MM3 (ref 0.7–3.1)
LYMPHOCYTES NFR BLD AUTO: 13.5 % (ref 19.6–45.3)
Lab: ABNORMAL
MAGNESIUM SERPL-MCNC: 1.9 MG/DL (ref 1.6–2.4)
MCH RBC QN AUTO: 29 PG (ref 26.6–33)
MCHC RBC AUTO-ENTMCNC: 32.9 G/DL (ref 31.5–35.7)
MCV RBC AUTO: 88.3 FL (ref 79–97)
METHGB BLD QL: <-1 % (ref 0–1.5)
MODALITY: ABNORMAL
MONOCYTES # BLD AUTO: 0.51 10*3/MM3 (ref 0.1–0.9)
MONOCYTES NFR BLD AUTO: 8.4 % (ref 5–12)
NEUTROPHILS NFR BLD AUTO: 4.68 10*3/MM3 (ref 1.7–7)
NEUTROPHILS NFR BLD AUTO: 77.2 % (ref 42.7–76)
NITRITE UR QL STRIP: NEGATIVE
NRBC BLD AUTO-RTO: 0 /100 WBC (ref 0–0.2)
NT-PROBNP SERPL-MCNC: 145.4 PG/ML (ref 0–900)
OXYHGB MFR BLDV: 93.9 % (ref 94–99)
PCO2 BLDA: 40.2 MM HG (ref 35–45)
PCO2 TEMP ADJ BLD: ABNORMAL MM[HG]
PH BLDA: 7.45 PH UNITS (ref 7.3–7.5)
PH UR STRIP.AUTO: <=5 [PH] (ref 5–8)
PH, TEMP CORRECTED: ABNORMAL
PLATELET # BLD AUTO: 211 10*3/MM3 (ref 140–450)
PMV BLD AUTO: 9.2 FL (ref 6–12)
PO2 BLDA: 60.9 MM HG (ref 75–100)
PO2 TEMP ADJ BLD: ABNORMAL MM[HG]
POTASSIUM SERPL-SCNC: 4.4 MMOL/L (ref 3.5–5.2)
PROT SERPL-MCNC: 6.9 G/DL (ref 6–8.5)
PROT UR QL STRIP: NEGATIVE
RBC # BLD AUTO: 4.2 10*6/MM3 (ref 3.77–5.28)
SAO2 % BLDCOA: 93.2 % (ref 94–100)
SARS-COV-2 RNA RESP QL NAA+PROBE: DETECTED
SODIUM SERPL-SCNC: 136 MMOL/L (ref 136–145)
SP GR UR STRIP: 1.01 (ref 1–1.03)
TROPONIN T SERPL HS-MCNC: 9 NG/L
UROBILINOGEN UR QL STRIP: NORMAL
VENTILATOR MODE: ABNORMAL
WBC NRBC COR # BLD AUTO: 6.07 10*3/MM3 (ref 3.4–10.8)
WHOLE BLOOD HOLD COAG: NORMAL
WHOLE BLOOD HOLD SPECIMEN: NORMAL

## 2024-09-19 PROCEDURE — 93005 ELECTROCARDIOGRAM TRACING: CPT | Performed by: STUDENT IN AN ORGANIZED HEALTH CARE EDUCATION/TRAINING PROGRAM

## 2024-09-19 PROCEDURE — 71250 CT THORAX DX C-: CPT

## 2024-09-19 PROCEDURE — 85025 COMPLETE CBC W/AUTO DIFF WBC: CPT | Performed by: STUDENT IN AN ORGANIZED HEALTH CARE EDUCATION/TRAINING PROGRAM

## 2024-09-19 PROCEDURE — 71045 X-RAY EXAM CHEST 1 VIEW: CPT

## 2024-09-19 PROCEDURE — 25010000002 DEXAMETHASONE SODIUM PHOSPHATE 10 MG/ML SOLUTION: Performed by: STUDENT IN AN ORGANIZED HEALTH CARE EDUCATION/TRAINING PROGRAM

## 2024-09-19 PROCEDURE — 85379 FIBRIN DEGRADATION QUANT: CPT | Performed by: STUDENT IN AN ORGANIZED HEALTH CARE EDUCATION/TRAINING PROGRAM

## 2024-09-19 PROCEDURE — G0378 HOSPITAL OBSERVATION PER HR: HCPCS

## 2024-09-19 PROCEDURE — 25810000003 LACTATED RINGERS SOLUTION: Performed by: STUDENT IN AN ORGANIZED HEALTH CARE EDUCATION/TRAINING PROGRAM

## 2024-09-19 PROCEDURE — 82805 BLOOD GASES W/O2 SATURATION: CPT

## 2024-09-19 PROCEDURE — 80053 COMPREHEN METABOLIC PANEL: CPT | Performed by: STUDENT IN AN ORGANIZED HEALTH CARE EDUCATION/TRAINING PROGRAM

## 2024-09-19 PROCEDURE — 82375 ASSAY CARBOXYHB QUANT: CPT

## 2024-09-19 PROCEDURE — 87636 SARSCOV2 & INF A&B AMP PRB: CPT | Performed by: STUDENT IN AN ORGANIZED HEALTH CARE EDUCATION/TRAINING PROGRAM

## 2024-09-19 PROCEDURE — 83735 ASSAY OF MAGNESIUM: CPT | Performed by: STUDENT IN AN ORGANIZED HEALTH CARE EDUCATION/TRAINING PROGRAM

## 2024-09-19 PROCEDURE — 81003 URINALYSIS AUTO W/O SCOPE: CPT | Performed by: STUDENT IN AN ORGANIZED HEALTH CARE EDUCATION/TRAINING PROGRAM

## 2024-09-19 PROCEDURE — 84484 ASSAY OF TROPONIN QUANT: CPT | Performed by: STUDENT IN AN ORGANIZED HEALTH CARE EDUCATION/TRAINING PROGRAM

## 2024-09-19 PROCEDURE — 36600 WITHDRAWAL OF ARTERIAL BLOOD: CPT

## 2024-09-19 PROCEDURE — 70450 CT HEAD/BRAIN W/O DYE: CPT

## 2024-09-19 PROCEDURE — 83880 ASSAY OF NATRIURETIC PEPTIDE: CPT | Performed by: STUDENT IN AN ORGANIZED HEALTH CARE EDUCATION/TRAINING PROGRAM

## 2024-09-19 PROCEDURE — 99223 1ST HOSP IP/OBS HIGH 75: CPT | Performed by: INTERNAL MEDICINE

## 2024-09-19 PROCEDURE — 25010000002 ENOXAPARIN PER 10 MG: Performed by: INTERNAL MEDICINE

## 2024-09-19 PROCEDURE — 83050 HGB METHEMOGLOBIN QUAN: CPT

## 2024-09-19 PROCEDURE — 99291 CRITICAL CARE FIRST HOUR: CPT

## 2024-09-19 RX ORDER — METOPROLOL SUCCINATE 25 MG/1
25 TABLET, EXTENDED RELEASE ORAL
Status: DISCONTINUED | OUTPATIENT
Start: 2024-09-20 | End: 2024-09-20 | Stop reason: HOSPADM

## 2024-09-19 RX ORDER — BISACODYL 10 MG
10 SUPPOSITORY, RECTAL RECTAL DAILY PRN
Status: DISCONTINUED | OUTPATIENT
Start: 2024-09-19 | End: 2024-09-20 | Stop reason: HOSPADM

## 2024-09-19 RX ORDER — DEXAMETHASONE SODIUM PHOSPHATE 10 MG/ML
6 INJECTION, SOLUTION INTRAMUSCULAR; INTRAVENOUS ONCE
Status: COMPLETED | OUTPATIENT
Start: 2024-09-19 | End: 2024-09-19

## 2024-09-19 RX ORDER — SODIUM CHLORIDE 0.9 % (FLUSH) 0.9 %
10 SYRINGE (ML) INJECTION EVERY 12 HOURS SCHEDULED
Status: DISCONTINUED | OUTPATIENT
Start: 2024-09-19 | End: 2024-09-20 | Stop reason: HOSPADM

## 2024-09-19 RX ORDER — FUROSEMIDE 20 MG
40 TABLET ORAL DAILY
Status: DISCONTINUED | OUTPATIENT
Start: 2024-09-20 | End: 2024-09-20 | Stop reason: HOSPADM

## 2024-09-19 RX ORDER — MULTIPLE VITAMINS W/ MINERALS TAB 9MG-400MCG
1 TAB ORAL DAILY
Status: DISCONTINUED | OUTPATIENT
Start: 2024-09-19 | End: 2024-09-20 | Stop reason: HOSPADM

## 2024-09-19 RX ORDER — POLYETHYLENE GLYCOL 3350 17 G/17G
17 POWDER, FOR SOLUTION ORAL DAILY PRN
Status: DISCONTINUED | OUTPATIENT
Start: 2024-09-19 | End: 2024-09-20 | Stop reason: HOSPADM

## 2024-09-19 RX ORDER — ASPIRIN 81 MG/1
81 TABLET ORAL DAILY
Status: DISCONTINUED | OUTPATIENT
Start: 2024-09-20 | End: 2024-09-20 | Stop reason: HOSPADM

## 2024-09-19 RX ORDER — BISACODYL 5 MG/1
5 TABLET, DELAYED RELEASE ORAL DAILY PRN
Status: DISCONTINUED | OUTPATIENT
Start: 2024-09-19 | End: 2024-09-20 | Stop reason: HOSPADM

## 2024-09-19 RX ORDER — ACETAMINOPHEN 325 MG/1
650 TABLET ORAL EVERY 4 HOURS PRN
Status: DISCONTINUED | OUTPATIENT
Start: 2024-09-19 | End: 2024-09-20 | Stop reason: HOSPADM

## 2024-09-19 RX ORDER — AMOXICILLIN 250 MG
2 CAPSULE ORAL 2 TIMES DAILY
Status: DISCONTINUED | OUTPATIENT
Start: 2024-09-19 | End: 2024-09-20 | Stop reason: HOSPADM

## 2024-09-19 RX ORDER — ENOXAPARIN SODIUM 100 MG/ML
40 INJECTION SUBCUTANEOUS NIGHTLY
Status: DISCONTINUED | OUTPATIENT
Start: 2024-09-19 | End: 2024-09-20 | Stop reason: HOSPADM

## 2024-09-19 RX ORDER — ACETAMINOPHEN 160 MG/5ML
650 SOLUTION ORAL EVERY 4 HOURS PRN
Status: DISCONTINUED | OUTPATIENT
Start: 2024-09-19 | End: 2024-09-20 | Stop reason: HOSPADM

## 2024-09-19 RX ORDER — ONDANSETRON 2 MG/ML
4 INJECTION INTRAMUSCULAR; INTRAVENOUS EVERY 6 HOURS PRN
Status: DISCONTINUED | OUTPATIENT
Start: 2024-09-19 | End: 2024-09-20 | Stop reason: HOSPADM

## 2024-09-19 RX ORDER — ATORVASTATIN CALCIUM 20 MG/1
20 TABLET, FILM COATED ORAL NIGHTLY
Status: DISCONTINUED | OUTPATIENT
Start: 2024-09-19 | End: 2024-09-20 | Stop reason: HOSPADM

## 2024-09-19 RX ORDER — SODIUM CHLORIDE 9 MG/ML
40 INJECTION, SOLUTION INTRAVENOUS AS NEEDED
Status: DISCONTINUED | OUTPATIENT
Start: 2024-09-19 | End: 2024-09-20 | Stop reason: HOSPADM

## 2024-09-19 RX ORDER — SPIRONOLACTONE 25 MG/1
25 TABLET ORAL DAILY
Status: DISCONTINUED | OUTPATIENT
Start: 2024-09-20 | End: 2024-09-20 | Stop reason: HOSPADM

## 2024-09-19 RX ORDER — ASCORBIC ACID 500 MG
500 TABLET ORAL DAILY
Status: DISCONTINUED | OUTPATIENT
Start: 2024-09-20 | End: 2024-09-20 | Stop reason: HOSPADM

## 2024-09-19 RX ORDER — SODIUM CHLORIDE 0.9 % (FLUSH) 0.9 %
10 SYRINGE (ML) INJECTION AS NEEDED
Status: DISCONTINUED | OUTPATIENT
Start: 2024-09-19 | End: 2024-09-20 | Stop reason: HOSPADM

## 2024-09-19 RX ORDER — LEVOTHYROXINE SODIUM 75 UG/1
75 TABLET ORAL
Status: DISCONTINUED | OUTPATIENT
Start: 2024-09-20 | End: 2024-09-20 | Stop reason: HOSPADM

## 2024-09-19 RX ORDER — ALBUTEROL SULFATE 90 UG/1
2 INHALANT RESPIRATORY (INHALATION) EVERY 4 HOURS PRN
Status: DISCONTINUED | OUTPATIENT
Start: 2024-09-19 | End: 2024-09-20 | Stop reason: HOSPADM

## 2024-09-19 RX ORDER — ACETAMINOPHEN 650 MG/1
650 SUPPOSITORY RECTAL EVERY 4 HOURS PRN
Status: DISCONTINUED | OUTPATIENT
Start: 2024-09-19 | End: 2024-09-20 | Stop reason: HOSPADM

## 2024-09-19 RX ORDER — METOPROLOL SUCCINATE 25 MG/1
25 TABLET, EXTENDED RELEASE ORAL
Status: DISCONTINUED | OUTPATIENT
Start: 2024-09-19 | End: 2024-09-19

## 2024-09-19 RX ADMIN — SODIUM CHLORIDE, POTASSIUM CHLORIDE, SODIUM LACTATE AND CALCIUM CHLORIDE 1000 ML: 600; 310; 30; 20 INJECTION, SOLUTION INTRAVENOUS at 14:08

## 2024-09-19 RX ADMIN — DEXAMETHASONE SODIUM PHOSPHATE 6 MG: 10 INJECTION INTRAMUSCULAR; INTRAVENOUS at 16:39

## 2024-09-19 RX ADMIN — ENOXAPARIN SODIUM 40 MG: 100 INJECTION SUBCUTANEOUS at 20:05

## 2024-09-19 RX ADMIN — ATORVASTATIN CALCIUM 20 MG: 20 TABLET, FILM COATED ORAL at 20:05

## 2024-09-20 VITALS
WEIGHT: 145.5 LBS | TEMPERATURE: 98.1 F | SYSTOLIC BLOOD PRESSURE: 96 MMHG | DIASTOLIC BLOOD PRESSURE: 56 MMHG | RESPIRATION RATE: 20 BRPM | BODY MASS INDEX: 23.38 KG/M2 | OXYGEN SATURATION: 92 % | HEART RATE: 109 BPM | HEIGHT: 66 IN

## 2024-09-20 PROBLEM — U07.1 COVID-19: Status: ACTIVE | Noted: 2024-09-20

## 2024-09-20 PROBLEM — D89.832 CYTOKINE RELEASE SYNDROME, GRADE 2: Status: ACTIVE | Noted: 2024-09-20

## 2024-09-20 LAB
ALBUMIN SERPL-MCNC: 4.1 G/DL (ref 3.5–5.2)
ALBUMIN/GLOB SERPL: 1.5 G/DL
ALP SERPL-CCNC: 111 U/L (ref 39–117)
ALT SERPL W P-5'-P-CCNC: 27 U/L (ref 1–33)
ANION GAP SERPL CALCULATED.3IONS-SCNC: 10.2 MMOL/L (ref 5–15)
AST SERPL-CCNC: 26 U/L (ref 1–32)
BILIRUB SERPL-MCNC: 0.2 MG/DL (ref 0–1.2)
BUN SERPL-MCNC: 9 MG/DL (ref 8–23)
BUN/CREAT SERPL: 12.7 (ref 7–25)
CALCIUM SPEC-SCNC: 10.1 MG/DL (ref 8.6–10.5)
CHLORIDE SERPL-SCNC: 104 MMOL/L (ref 98–107)
CO2 SERPL-SCNC: 23.8 MMOL/L (ref 22–29)
CREAT SERPL-MCNC: 0.71 MG/DL (ref 0.57–1)
DEPRECATED RDW RBC AUTO: 48.5 FL (ref 37–54)
EGFRCR SERPLBLD CKD-EPI 2021: 95.7 ML/MIN/1.73
ERYTHROCYTE [DISTWIDTH] IN BLOOD BY AUTOMATED COUNT: 15 % (ref 12.3–15.4)
GLOBULIN UR ELPH-MCNC: 2.7 GM/DL
GLUCOSE SERPL-MCNC: 170 MG/DL (ref 65–99)
HCT VFR BLD AUTO: 37.5 % (ref 34–46.6)
HGB BLD-MCNC: 12.4 G/DL (ref 12–15.9)
MCH RBC QN AUTO: 29 PG (ref 26.6–33)
MCHC RBC AUTO-ENTMCNC: 33.1 G/DL (ref 31.5–35.7)
MCV RBC AUTO: 87.8 FL (ref 79–97)
PLATELET # BLD AUTO: 215 10*3/MM3 (ref 140–450)
PMV BLD AUTO: 9.4 FL (ref 6–12)
POTASSIUM SERPL-SCNC: 4.1 MMOL/L (ref 3.5–5.2)
PROT SERPL-MCNC: 6.8 G/DL (ref 6–8.5)
RBC # BLD AUTO: 4.27 10*6/MM3 (ref 3.77–5.28)
SODIUM SERPL-SCNC: 138 MMOL/L (ref 136–145)
WBC NRBC COR # BLD AUTO: 5.71 10*3/MM3 (ref 3.4–10.8)

## 2024-09-20 PROCEDURE — 99238 HOSP IP/OBS DSCHRG MGMT 30/<: CPT | Performed by: INTERNAL MEDICINE

## 2024-09-20 PROCEDURE — 80053 COMPREHEN METABOLIC PANEL: CPT | Performed by: INTERNAL MEDICINE

## 2024-09-20 PROCEDURE — 85027 COMPLETE CBC AUTOMATED: CPT | Performed by: INTERNAL MEDICINE

## 2024-09-20 RX ORDER — DEXAMETHASONE 6 MG/1
6 TABLET ORAL
Qty: 3 TABLET | Refills: 0 | Status: SHIPPED | OUTPATIENT
Start: 2024-09-21 | End: 2024-09-27

## 2024-09-20 RX ORDER — MULTIPLE VITAMINS W/ MINERALS TAB 9MG-400MCG
1 TAB ORAL DAILY
Qty: 30 EACH | Refills: 0 | Status: SHIPPED | OUTPATIENT
Start: 2024-09-21

## 2024-09-20 RX ADMIN — SPIRONOLACTONE 25 MG: 25 TABLET, FILM COATED ORAL at 08:08

## 2024-09-20 RX ADMIN — DEXAMETHASONE 6 MG: 2 TABLET ORAL at 08:07

## 2024-09-20 RX ADMIN — METOPROLOL SUCCINATE 25 MG: 25 TABLET, EXTENDED RELEASE ORAL at 08:08

## 2024-09-20 RX ADMIN — OXYCODONE HYDROCHLORIDE AND ACETAMINOPHEN 500 MG: 500 TABLET ORAL at 08:08

## 2024-09-20 RX ADMIN — FUROSEMIDE 40 MG: 20 TABLET ORAL at 08:08

## 2024-09-20 RX ADMIN — Medication 1 TABLET: at 08:08

## 2024-09-20 RX ADMIN — ASPIRIN 81 MG: 81 TABLET, COATED ORAL at 08:08

## 2024-09-20 RX ADMIN — LEVOTHYROXINE SODIUM 75 MCG: 0.07 TABLET ORAL at 06:17

## 2024-09-21 ENCOUNTER — READMISSION MANAGEMENT (OUTPATIENT)
Dept: CALL CENTER | Facility: HOSPITAL | Age: 64
End: 2024-09-21
Payer: MEDICARE

## 2024-09-25 ENCOUNTER — TELEPHONE (OUTPATIENT)
Dept: NUTRITION | Facility: HOSPITAL | Age: 64
End: 2024-09-25
Payer: MEDICARE

## 2024-09-25 ENCOUNTER — READMISSION MANAGEMENT (OUTPATIENT)
Dept: CALL CENTER | Facility: HOSPITAL | Age: 64
End: 2024-09-25
Payer: MEDICARE

## 2024-10-01 ENCOUNTER — READMISSION MANAGEMENT (OUTPATIENT)
Dept: CALL CENTER | Facility: HOSPITAL | Age: 64
End: 2024-10-01
Payer: MEDICARE

## 2024-10-01 ENCOUNTER — TELEPHONE (OUTPATIENT)
Dept: ONCOLOGY | Facility: CLINIC | Age: 64
End: 2024-10-01
Payer: MEDICARE

## 2024-10-01 NOTE — TELEPHONE ENCOUNTER
Caller: Gayatri Kahn    Relationship to patient: Self    Best call back number: [phone]     Type of visit: F/U AND INFUSION    Requested date: EARLY MORNING APPT TIME    If rescheduling, when is the original appointment: 9/25

## 2024-10-01 NOTE — OUTREACH NOTE
Medical Week 2 Survey      Flowsheet Row Responses   [facility] patient discharged from? [facility]   Does the patient have one of the following disease processes/diagnoses(primary or secondary)? Other   Week 2 attempt successful? Yes   Call start time 1756   Discharge diagnosis COVID-19 virus infection   Call end time 1757   Meds reviewed with patient/caregiver? Yes   Is the patient having any side effects they believe may be caused by any medication additions or changes? No   Does the patient have all medications ordered at discharge? Yes   Is the patient taking all medications as directed (includes completed medication regime)? Yes   Does the patient have a primary care provider?  Yes   Does the patient have an appointment with their PCP within 7 days of discharge? Greater than 7 days   What is preventing the patient from scheduling follow up appointments within 7 days of discharge? Earlier appointment not available   Nursing Interventions Verified appointment date/time/provider   Has the patient kept scheduled appointments due by today? N/A   Has home health visited the patient within 72 hours of discharge? N/A   Psychosocial issues? No   Did the patient receive a copy of their discharge instructions? Yes   Nursing interventions Reviewed instructions with patient   What is the patient's perception of their health status since discharge? Improving   Is the patient/caregiver able to teach back signs and symptoms related to disease process for when to call PCP? Yes   Is the patient/caregiver able to teach back signs and symptoms related to disease process for when to call 911? Yes   Is the patient/caregiver able to teach back the hierarchy of who to call/visit for symptoms/problems? PCP, Specialist, Home health nurse, Urgent Care, ED, 911 Yes   Week 2 Call Completed? Yes   Call end time 1757            Caridad DELGADO - Registered Nurse

## 2024-10-23 ENCOUNTER — HOSPITAL ENCOUNTER (OUTPATIENT)
Facility: HOSPITAL | Age: 64
Discharge: HOME | End: 2024-10-23
Admitting: INTERNAL MEDICINE
Payer: MEDICARE

## 2024-10-23 ENCOUNTER — OFFICE VISIT (OUTPATIENT)
Dept: ONCOLOGY | Facility: CLINIC | Age: 64
End: 2024-10-23
Payer: MEDICARE

## 2024-10-23 VITALS
RESPIRATION RATE: 18 BRPM | BODY MASS INDEX: 23.87 KG/M2 | SYSTOLIC BLOOD PRESSURE: 112 MMHG | TEMPERATURE: 97.3 F | HEIGHT: 66 IN | DIASTOLIC BLOOD PRESSURE: 69 MMHG | HEART RATE: 90 BPM | WEIGHT: 148.5 LBS | OXYGEN SATURATION: 98 %

## 2024-10-23 DIAGNOSIS — C34.31 SMALL CELL LUNG CANCER, RIGHT LOWER LOBE: Primary | ICD-10-CM

## 2024-10-23 DIAGNOSIS — C77.2 SECONDARY MALIGNANT NEOPLASM OF INTRA-ABDOMINAL LYMPH NODES: ICD-10-CM

## 2024-10-23 LAB
ALBUMIN SERPL-MCNC: 4.3 G/DL (ref 3.5–5.2)
ALBUMIN/GLOB SERPL: 1.5 G/DL
ALP SERPL-CCNC: 129 U/L (ref 39–117)
ALT SERPL W P-5'-P-CCNC: 37 U/L (ref 1–33)
ANION GAP SERPL CALCULATED.3IONS-SCNC: 10.4 MMOL/L (ref 5–15)
AST SERPL-CCNC: 34 U/L (ref 1–32)
BASOPHILS # BLD AUTO: 0.04 10*3/MM3 (ref 0–0.2)
BASOPHILS NFR BLD AUTO: 0.8 % (ref 0–1.5)
BILIRUB SERPL-MCNC: 0.3 MG/DL (ref 0–1.2)
BUN SERPL-MCNC: 10 MG/DL (ref 8–23)
BUN/CREAT SERPL: 12.3 (ref 7–25)
CALCIUM SPEC-SCNC: 9.6 MG/DL (ref 8.6–10.5)
CHLORIDE SERPL-SCNC: 103 MMOL/L (ref 98–107)
CO2 SERPL-SCNC: 27.6 MMOL/L (ref 22–29)
CREAT SERPL-MCNC: 0.81 MG/DL (ref 0.57–1)
DEPRECATED RDW RBC AUTO: 53.1 FL (ref 37–54)
EGFRCR SERPLBLD CKD-EPI 2021: 81.7 ML/MIN/1.73
EOSINOPHIL # BLD AUTO: 0.04 10*3/MM3 (ref 0–0.4)
EOSINOPHIL NFR BLD AUTO: 0.8 % (ref 0.3–6.2)
ERYTHROCYTE [DISTWIDTH] IN BLOOD BY AUTOMATED COUNT: 16 % (ref 12.3–15.4)
GLOBULIN UR ELPH-MCNC: 2.8 GM/DL
GLUCOSE SERPL-MCNC: 85 MG/DL (ref 65–99)
HCT VFR BLD AUTO: 37.1 % (ref 34–46.6)
HGB BLD-MCNC: 12.3 G/DL (ref 12–15.9)
IMM GRANULOCYTES # BLD AUTO: 0.02 10*3/MM3 (ref 0–0.05)
IMM GRANULOCYTES NFR BLD AUTO: 0.4 % (ref 0–0.5)
LYMPHOCYTES # BLD AUTO: 1.44 10*3/MM3 (ref 0.7–3.1)
LYMPHOCYTES NFR BLD AUTO: 29.7 % (ref 19.6–45.3)
MCH RBC QN AUTO: 29.8 PG (ref 26.6–33)
MCHC RBC AUTO-ENTMCNC: 33.2 G/DL (ref 31.5–35.7)
MCV RBC AUTO: 89.8 FL (ref 79–97)
MONOCYTES # BLD AUTO: 0.48 10*3/MM3 (ref 0.1–0.9)
MONOCYTES NFR BLD AUTO: 9.9 % (ref 5–12)
NEUTROPHILS NFR BLD AUTO: 2.83 10*3/MM3 (ref 1.7–7)
NEUTROPHILS NFR BLD AUTO: 58.4 % (ref 42.7–76)
NRBC BLD AUTO-RTO: 0 /100 WBC (ref 0–0.2)
PLATELET # BLD AUTO: 236 10*3/MM3 (ref 140–450)
PMV BLD AUTO: 9.4 FL (ref 6–12)
POTASSIUM SERPL-SCNC: 4.1 MMOL/L (ref 3.5–5.2)
PROT SERPL-MCNC: 7.1 G/DL (ref 6–8.5)
RBC # BLD AUTO: 4.13 10*6/MM3 (ref 3.77–5.28)
SODIUM SERPL-SCNC: 141 MMOL/L (ref 136–145)
T4 FREE SERPL-MCNC: 1.21 NG/DL (ref 0.92–1.68)
TSH SERPL DL<=0.05 MIU/L-ACNC: 8.01 UIU/ML (ref 0.27–4.2)
WBC NRBC COR # BLD AUTO: 4.85 10*3/MM3 (ref 3.4–10.8)

## 2024-10-23 PROCEDURE — 80053 COMPREHEN METABOLIC PANEL: CPT | Performed by: INTERNAL MEDICINE

## 2024-10-23 PROCEDURE — 99214 OFFICE O/P EST MOD 30 MIN: CPT | Performed by: INTERNAL MEDICINE

## 2024-10-23 PROCEDURE — 84439 ASSAY OF FREE THYROXINE: CPT | Performed by: INTERNAL MEDICINE

## 2024-10-23 PROCEDURE — 25010000002 DURVALUMAB 50 MG/ML SOLUTION 10 ML VIAL: Performed by: INTERNAL MEDICINE

## 2024-10-23 PROCEDURE — 1126F AMNT PAIN NOTED NONE PRSNT: CPT | Performed by: INTERNAL MEDICINE

## 2024-10-23 PROCEDURE — 96413 CHEMO IV INFUSION 1 HR: CPT

## 2024-10-23 PROCEDURE — 25810000003 SODIUM CHLORIDE 0.9 % SOLUTION 250 ML FLEX CONT: Performed by: INTERNAL MEDICINE

## 2024-10-23 PROCEDURE — 85025 COMPLETE CBC W/AUTO DIFF WBC: CPT | Performed by: INTERNAL MEDICINE

## 2024-10-23 PROCEDURE — 84443 ASSAY THYROID STIM HORMONE: CPT | Performed by: INTERNAL MEDICINE

## 2024-10-23 RX ORDER — SODIUM CHLORIDE 9 MG/ML
20 INJECTION, SOLUTION INTRAVENOUS ONCE
Status: DISCONTINUED | OUTPATIENT
Start: 2024-10-23 | End: 2024-10-24 | Stop reason: HOSPADM

## 2024-10-23 RX ORDER — SODIUM CHLORIDE 9 MG/ML
20 INJECTION, SOLUTION INTRAVENOUS ONCE
Status: CANCELLED | OUTPATIENT
Start: 2024-10-23

## 2024-10-23 RX ADMIN — SODIUM CHLORIDE 1500 MG: 9 INJECTION, SOLUTION INTRAVENOUS at 12:17

## 2024-10-23 NOTE — PROGRESS NOTES
DATE OF VISIT: 10/23/2024    REASON FOR VISIT: Followup for right lower lobe small cell lung cancer     PROBLEM LIST:  1. Right lower lobe small cell lung cancer T1 N1 M0 stage IIb:  A.  Presented with ataxia and lower extremities weakness  B.  Negative bronchoscopy August 19, 2020 and endobronchial ultrasound September 9, 2020  C.  CT chest done December 7, 2020 revealed 2.7 cm right hilar mass  D.  Whole-body PET scan done January 11, 2020 revealed isolated hypermetabolic activity at the right hilar mass SUV of 12  E. positive serology for AGN A-1 antibody which is 92% predicted for small cell lung cancer, positive N type calcium channel blocker and P/Q type calcium channel blooker.  F.  Started chest radiation February 26, 2021, completed end of March 2021  G.  Repeated PET scan done June 08, 2023 revealed complete resolution of activity in the chest however new portacaval hypermetabolic mass.  Biopsy-proven small cell cancer July 12, 2023  H.  Treated with definitive radiotherapy, completed July 2023.  I.  Progressive disease with worsening para-aortic lymphadenopathy document CT scans in February 1, 2024.  J.  Started chemotherapy with carboplatin etoposide and Imfinzi February 2024, Status post 4 cycles.  K.  Started maintenance Imfinzi May 8, 2024.  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  Hypertension  4.  Hypercholesteremia      HISTORY OF PRESENT ILLNESS: The patient is a very pleasant 64 y.o. female  with past medical history significant for suspected small cell lung cancer of the right lung. The patient never had a positive biopsy however her serology was suggestive of SCLCA after presenting with Lambert-Eaton syndrome. The patient is here today for scheduled follow up visit with treatment maintenance therapy, Imfinzi cycle #6.      SUBJECTIVE: Gayatri is here today with her .  Her energy is improving.  She still has dizziness.      Past History:  Medical  History: has a past medical history of Arthritis, Disease of thyroid gland, Heart disease, History of radiation therapy (08/11/2023), Hyperlipidemia, Lambert-Eaton syndrome, Lung cancer (02/2021), Metastatic cancer, Pneumonia, and Requires supplemental oxygen.   Surgical History: has a past surgical history that includes Hysterectomy; Foot surgery; and Knee surgery.   Family History: family history includes Brain cancer in her father; Breast cancer in her sister; Heart disease in her brother, mother, and sister; Kidney disease in her mother.   Social History: reports that she quit smoking about 2 years ago. Her smoking use included cigarettes. She started smoking about 17 years ago. She has a 15 pack-year smoking history. She has never used smokeless tobacco. She reports that she does not drink alcohol and does not use drugs.    (Not in a hospital admission)     Allergies: Erythromycin     Review of Systems   Constitutional:  Positive for fatigue.   Respiratory: Negative.     Gastrointestinal: Negative.    Musculoskeletal:  Positive for gait problem.   Neurological:  Positive for dizziness and weakness.         Current Outpatient Medications:     Amifampridine Phosphate (Firdapse) 10 MG tablet, Take 2 tablets by mouth 4 (Four) Times a Day., Disp: , Rfl:     aspirin 81 MG EC tablet, Take 1 tablet by mouth Daily. 7-6-23 last dose, Disp: , Rfl:     furosemide (LASIX) 40 MG tablet, Take 1 tablet by mouth Daily., Disp: , Rfl:     ipratropium-albuterol (DUO-NEB) 0.5-2.5 mg/3 ml nebulizer, , Disp: , Rfl:     levothyroxine (SYNTHROID, LEVOTHROID) 75 MCG tablet, Take 1 tablet by mouth Daily., Disp: , Rfl:     metoprolol tartrate (LOPRESSOR) 50 MG tablet, Take 0.5 tablets by mouth Daily As Needed. Patient states she takes as needed., Disp: , Rfl:     multivitamin with minerals tablet tablet, Take 1 tablet by mouth Daily., Disp: 30 each, Rfl: 0    potassium chloride (K-DUR,KLOR-CON) 20 MEQ CR tablet, Take 1 tablet by mouth  "Daily., Disp: , Rfl:     spironolactone (ALDACTONE) 25 MG tablet, Take 1 tablet by mouth Daily., Disp: , Rfl:     PHYSICAL EXAMINATION:   /69   Pulse 90   Temp 97.3 °F (36.3 °C)   Resp 18   Ht 167 cm (65.75\")   Wt 67.4 kg (148 lb 8 oz)   SpO2 98%   BMI 24.15 kg/m²    Pain Score    10/23/24 0939   PainSc: 0-No pain                            ECOG Performance Status: 2 - Symptomatic, <50% confined to bed  General Appearance:  alert, cooperative, no apparent distress and appears stated age   Neurologic/Psychiatric: A&O x 3, gait steady, appropriate affect, strength 5/5 in all muscle groups   HEENT:  Normocephalic, without obvious abnormality, mucous membranes moist   Neck: Supple, symmetrical, trachea midline, no adenopathy;  No thyromegaly, masses, or tenderness   Lungs:   Clear to auscultation bilaterally; respirations regular, even, and unlabored bilaterally   Heart:  Regular rate and rhythm, no murmurs appreciated   Abdomen:   Soft, non-tender, and non-distended   Lymph nodes: No cervical, supraclavicular, inguinal or axillary adenopathy noted   Extremities: Normal, atraumatic; no clubbing, cyanosis, or edema    Skin: No rashes, ulcers, or suspicious lesions noted     No visits with results within 2 Week(s) from this visit.   Latest known visit with results is:   Admission on 09/19/2024, Discharged on 09/20/2024   Component Date Value Ref Range Status    Glucose 09/19/2024 80  65 - 99 mg/dL Final    BUN 09/19/2024 11  8 - 23 mg/dL Final    Creatinine 09/19/2024 0.85  0.57 - 1.00 mg/dL Final    Sodium 09/19/2024 136  136 - 145 mmol/L Final    Potassium 09/19/2024 4.4  3.5 - 5.2 mmol/L Final    Chloride 09/19/2024 97 (L)  98 - 107 mmol/L Final    CO2 09/19/2024 28.2  22.0 - 29.0 mmol/L Final    Calcium 09/19/2024 10.2  8.6 - 10.5 mg/dL Final    Total Protein 09/19/2024 6.9  6.0 - 8.5 g/dL Final    Albumin 09/19/2024 4.3  3.5 - 5.2 g/dL Final    ALT (SGPT) 09/19/2024 24  1 - 33 U/L Final    AST (SGOT) " 09/19/2024 26  1 - 32 U/L Final    Alkaline Phosphatase 09/19/2024 122 (H)  39 - 117 U/L Final    Total Bilirubin 09/19/2024 0.3  0.0 - 1.2 mg/dL Final    Globulin 09/19/2024 2.6  gm/dL Final    A/G Ratio 09/19/2024 1.7  g/dL Final    BUN/Creatinine Ratio 09/19/2024 12.9  7.0 - 25.0 Final    Anion Gap 09/19/2024 10.8  5.0 - 15.0 mmol/L Final    eGFR 09/19/2024 77.1  >60.0 mL/min/1.73 Final    HS Troponin T 09/19/2024 9  <14 ng/L Final    Magnesium 09/19/2024 1.9  1.6 - 2.4 mg/dL Final    Color, UA 09/19/2024 Yellow  Yellow, Straw Final    Appearance, UA 09/19/2024 Clear  Clear Final    pH, UA 09/19/2024 <=5.0  5.0 - 8.0 Final    Specific Gravity, UA 09/19/2024 1.006  1.005 - 1.030 Final    Glucose, UA 09/19/2024 Negative  Negative Final    Ketones, UA 09/19/2024 Negative  Negative Final    Bilirubin, UA 09/19/2024 Negative  Negative Final    Blood, UA 09/19/2024 Negative  Negative Final    Protein, UA 09/19/2024 Negative  Negative Final    Leuk Esterase, UA 09/19/2024 Negative  Negative Final    Nitrite, UA 09/19/2024 Negative  Negative Final    Urobilinogen, UA 09/19/2024 0.2 E.U./dL  0.2 - 1.0 E.U./dL Final    Extra Tube 09/19/2024 Hold for add-ons.   Final    Auto resulted.    Extra Tube 09/19/2024 hold for add-on   Final    Auto resulted    Extra Tube 09/19/2024 Hold for add-ons.   Final    Auto resulted.    Extra Tube 09/19/2024 Hold for add-ons.   Final    Auto resulted    WBC 09/19/2024 6.07  3.40 - 10.80 10*3/mm3 Final    RBC 09/19/2024 4.20  3.77 - 5.28 10*6/mm3 Final    Hemoglobin 09/19/2024 12.2  12.0 - 15.9 g/dL Final    Hematocrit 09/19/2024 37.1  34.0 - 46.6 % Final    MCV 09/19/2024 88.3  79.0 - 97.0 fL Final    MCH 09/19/2024 29.0  26.6 - 33.0 pg Final    MCHC 09/19/2024 32.9  31.5 - 35.7 g/dL Final    RDW 09/19/2024 14.9  12.3 - 15.4 % Final    RDW-SD 09/19/2024 48.2  37.0 - 54.0 fl Final    MPV 09/19/2024 9.2  6.0 - 12.0 fL Final    Platelets 09/19/2024 211  140 - 450 10*3/mm3 Final    Neutrophil %  09/19/2024 77.2 (H)  42.7 - 76.0 % Final    Lymphocyte % 09/19/2024 13.5 (L)  19.6 - 45.3 % Final    Monocyte % 09/19/2024 8.4  5.0 - 12.0 % Final    Eosinophil % 09/19/2024 0.3  0.3 - 6.2 % Final    Basophil % 09/19/2024 0.3  0.0 - 1.5 % Final    Immature Grans % 09/19/2024 0.3  0.0 - 0.5 % Final    Neutrophils, Absolute 09/19/2024 4.68  1.70 - 7.00 10*3/mm3 Final    Lymphocytes, Absolute 09/19/2024 0.82  0.70 - 3.10 10*3/mm3 Final    Monocytes, Absolute 09/19/2024 0.51  0.10 - 0.90 10*3/mm3 Final    Eosinophils, Absolute 09/19/2024 0.02  0.00 - 0.40 10*3/mm3 Final    Basophils, Absolute 09/19/2024 0.02  0.00 - 0.20 10*3/mm3 Final    Immature Grans, Absolute 09/19/2024 0.02  0.00 - 0.05 10*3/mm3 Final    nRBC 09/19/2024 0.0  0.0 - 0.2 /100 WBC Final    proBNP 09/19/2024 145.4  0.0 - 900.0 pg/mL Final    D-Dimer, Quantitative 09/19/2024 0.45  0.00 - 0.63 MCGFEU/mL Final    COVID19 09/19/2024 Detected (C)  Not Detected - Ref. Range Final    Influenza A PCR 09/19/2024 Not Detected  Not Detected Final    Influenza B PCR 09/19/2024 Not Detected  Not Detected Final    Site 09/19/2024 Right Brachial   Final    Joseph's Test 09/19/2024 N/A   Final    pH, Arterial 09/19/2024 7.450  7.300 - 7.500 pH units Final    pCO2, Arterial 09/19/2024 40.2  35.0 - 45.0 mm Hg Final    pO2, Arterial 09/19/2024 60.9 (L)  75.0 - 100.0 mm Hg Final    84 Value below reference range    HCO3, Arterial 09/19/2024 27.9  22.0 - 28.0 mmol/L Final    83 Value above reference range    Base Excess, Arterial 09/19/2024 3.6 (H)  0.0 - 2.0 mmol/L Final    83 Value above reference range    O2 Saturation, Arterial 09/19/2024 93.2 (L)  94.0 - 100.0 % Final    84 Value below reference range    Hematocrit, Blood Gas 09/19/2024 35.0  % Final    84 Value below reference range    Oxyhemoglobin 09/19/2024 93.9 (L)  94 - 99 % Final    84 Value below reference range    Methemoglobin 09/19/2024 <-1.00 (L)  0.00 - 1.50 % Final    94 Value below reportable range < _1.0     Carboxyhemoglobin 09/19/2024 0.7  0 - 2 % Final    A-a DO2 09/19/2024 37.4  mmHg Final    Barometric Pressure for Blood Gas 09/19/2024 731  mmHg Final    Modality 09/19/2024 Room Air   Final    Ventilator Mode 09/19/2024 NA   Final    Collected by 09/19/2024 [id number]   Final    Meter: [id number]     :  [id number]    WBC 09/20/2024 5.71  3.40 - 10.80 10*3/mm3 Final    RBC 09/20/2024 4.27  3.77 - 5.28 10*6/mm3 Final    Hemoglobin 09/20/2024 12.4  12.0 - 15.9 g/dL Final    Hematocrit 09/20/2024 37.5  34.0 - 46.6 % Final    MCV 09/20/2024 87.8  79.0 - 97.0 fL Final    MCH 09/20/2024 29.0  26.6 - 33.0 pg Final    MCHC 09/20/2024 33.1  31.5 - 35.7 g/dL Final    RDW 09/20/2024 15.0  12.3 - 15.4 % Final    RDW-SD 09/20/2024 48.5  37.0 - 54.0 fl Final    MPV 09/20/2024 9.4  6.0 - 12.0 fL Final    Platelets 09/20/2024 215  140 - 450 10*3/mm3 Final    Glucose 09/20/2024 170 (H)  65 - 99 mg/dL Final    BUN 09/20/2024 9  8 - 23 mg/dL Final    Creatinine 09/20/2024 0.71  0.57 - 1.00 mg/dL Final    Sodium 09/20/2024 138  136 - 145 mmol/L Final    Potassium 09/20/2024 4.1  3.5 - 5.2 mmol/L Final    Chloride 09/20/2024 104  98 - 107 mmol/L Final    CO2 09/20/2024 23.8  22.0 - 29.0 mmol/L Final    Calcium 09/20/2024 10.1  8.6 - 10.5 mg/dL Final    Total Protein 09/20/2024 6.8  6.0 - 8.5 g/dL Final    Albumin 09/20/2024 4.1  3.5 - 5.2 g/dL Final    ALT (SGPT) 09/20/2024 27  1 - 33 U/L Final    AST (SGOT) 09/20/2024 26  1 - 32 U/L Final    Alkaline Phosphatase 09/20/2024 111  39 - 117 U/L Final    Total Bilirubin 09/20/2024 0.2  0.0 - 1.2 mg/dL Final    Globulin 09/20/2024 2.7  gm/dL Final    A/G Ratio 09/20/2024 1.5  g/dL Final    BUN/Creatinine Ratio 09/20/2024 12.7  7.0 - 25.0 Final    Anion Gap 09/20/2024 10.2  5.0 - 15.0 mmol/L Final    eGFR 09/20/2024 95.7  >60.0 mL/min/1.73 Final        No results found.      ASSESSMENT: The patient is a very pleasant 64 y.o. female  with small cell lung cancer      PLAN:    1.  Right lower lobe small cell lung cancer TxN2 M1b stage [place]:  A.  I will proceed with treatment as scheduled today maintenance immunotherapy Imfinzi cycle #6.  B.  She will follow-up with us in 4 weeks for cycle #7.  C. We reviewed potential side effects of immunotherapy including but not limited to immune mediated reactions with thyroiditis, pneumonitis, hepatitis, colitis, rash, and electrolyte abnormalities, fatigue, multiorgan failure, and possibly death.  D.  I will continue to monitor the patient blood work including blood counts kidney function liver function and electrolytes.  E.  I will do 4 months follow-up study which should be due end of November 2024.  Those will be ordered for prior to return.    2. Lambert-Eaton syndrome:  A. She will continue Firdapse 10 mg 4 times daily as prescribed by neurology.  B.  I do think this should be a contraindication for her immunotherapy since chemotherapy with treating the underlying cause for her neurologic syndrome.  C.  They are considering plasma exchange once chemotherapy has completed.    3. Hypothyroid:  A. She will continue Synthroid daily    4.  Poor systolic cardiac function:  A.  Status post defibrillator placement.    5.  Dizziness:  A.  MRI on 2/12/2024 showed negative for metastatic disease.    6.  Chemotherapy-induced nausea:  A.  I will continue Zofran as needed.    FOLLOW UP: 4 weeks with cycle 7.      Susi Irving MD  10/23/2024

## 2024-11-01 ENCOUNTER — TELEPHONE (OUTPATIENT)
Dept: NUTRITION | Facility: HOSPITAL | Age: 64
End: 2024-11-01
Payer: MEDICARE

## 2024-11-01 NOTE — PROGRESS NOTES
Outpatient Oncology Nutrition Follow-up      Patient Name: Gayatri Kahn  YOB: 1960  MRN: [id number]      Follow-up Date:  11/01/24     Comments:     Spoke w/ patient for nutrition follow-up regarding oncology treatment. Patient denies any recent weight loss or appetite changes. Patient denies any nutrition-related questions/concerns at this time. RD to follow-up in one month.                 Electronically signed by:   Marni Nails RD  11/01/24 14:05 EDT

## 2024-11-19 ENCOUNTER — HOSPITAL ENCOUNTER (OUTPATIENT)
Dept: CT IMAGING | Facility: HOSPITAL | Age: 64
Discharge: HOME | End: 2024-11-19
Admitting: INTERNAL MEDICINE
Payer: MEDICARE

## 2024-11-19 DIAGNOSIS — C34.31 SMALL CELL LUNG CANCER, RIGHT LOWER LOBE: ICD-10-CM

## 2024-11-19 PROCEDURE — 74177 CT ABD & PELVIS W/CONTRAST: CPT

## 2024-11-19 PROCEDURE — 25510000001 IOPAMIDOL 61 % SOLUTION: Performed by: INTERNAL MEDICINE

## 2024-11-19 PROCEDURE — 70491 CT SOFT TISSUE NECK W/DYE: CPT

## 2024-11-19 PROCEDURE — 71260 CT THORAX DX C+: CPT

## 2024-11-19 RX ORDER — IOPAMIDOL 612 MG/ML
100 INJECTION, SOLUTION INTRAVASCULAR
Status: COMPLETED | OUTPATIENT
Start: 2024-11-19 | End: 2024-11-19

## 2024-11-19 RX ADMIN — IOPAMIDOL 85 ML: 612 INJECTION, SOLUTION INTRAVENOUS at 08:49

## 2024-11-20 ENCOUNTER — OFFICE VISIT (OUTPATIENT)
Dept: ONCOLOGY | Facility: CLINIC | Age: 64
End: 2024-11-20
Payer: MEDICARE

## 2024-11-20 ENCOUNTER — HOSPITAL ENCOUNTER (OUTPATIENT)
Facility: HOSPITAL | Age: 64
Discharge: HOME | End: 2024-11-20
Admitting: INTERNAL MEDICINE
Payer: MEDICARE

## 2024-11-20 VITALS
OXYGEN SATURATION: 97 % | TEMPERATURE: 97.7 F | BODY MASS INDEX: 24.11 KG/M2 | RESPIRATION RATE: 18 BRPM | HEART RATE: 94 BPM | DIASTOLIC BLOOD PRESSURE: 86 MMHG | WEIGHT: 150 LBS | HEIGHT: 66 IN | SYSTOLIC BLOOD PRESSURE: 119 MMHG

## 2024-11-20 DIAGNOSIS — C34.31 SMALL CELL LUNG CANCER, RIGHT LOWER LOBE: Primary | ICD-10-CM

## 2024-11-20 DIAGNOSIS — C77.2 SECONDARY MALIGNANT NEOPLASM OF INTRA-ABDOMINAL LYMPH NODES: ICD-10-CM

## 2024-11-20 LAB
ALBUMIN SERPL-MCNC: 4.1 G/DL (ref 3.5–5.2)
ALBUMIN/GLOB SERPL: 1.5 G/DL
ALP SERPL-CCNC: 109 U/L (ref 39–117)
ALT SERPL W P-5'-P-CCNC: 21 U/L (ref 1–33)
ANION GAP SERPL CALCULATED.3IONS-SCNC: 10.7 MMOL/L (ref 5–15)
AST SERPL-CCNC: 30 U/L (ref 1–32)
BASOPHILS # BLD AUTO: 0.05 10*3/MM3 (ref 0–0.2)
BASOPHILS NFR BLD AUTO: 0.9 % (ref 0–1.5)
BILIRUB SERPL-MCNC: 0.2 MG/DL (ref 0–1.2)
BUN SERPL-MCNC: 10 MG/DL (ref 8–23)
BUN/CREAT SERPL: 13.3 (ref 7–25)
CALCIUM SPEC-SCNC: 9.9 MG/DL (ref 8.6–10.5)
CHLORIDE SERPL-SCNC: 105 MMOL/L (ref 98–107)
CO2 SERPL-SCNC: 25.3 MMOL/L (ref 22–29)
CREAT SERPL-MCNC: 0.75 MG/DL (ref 0.57–1)
DEPRECATED RDW RBC AUTO: 45.1 FL (ref 37–54)
EGFRCR SERPLBLD CKD-EPI 2021: 89.6 ML/MIN/1.73
EOSINOPHIL # BLD AUTO: 0.11 10*3/MM3 (ref 0–0.4)
EOSINOPHIL NFR BLD AUTO: 2 % (ref 0.3–6.2)
ERYTHROCYTE [DISTWIDTH] IN BLOOD BY AUTOMATED COUNT: 13.8 % (ref 12.3–15.4)
GLOBULIN UR ELPH-MCNC: 2.7 GM/DL
GLUCOSE SERPL-MCNC: 106 MG/DL (ref 65–99)
HCT VFR BLD AUTO: 34.8 % (ref 34–46.6)
HGB BLD-MCNC: 12 G/DL (ref 12–15.9)
IMM GRANULOCYTES # BLD AUTO: 0.02 10*3/MM3 (ref 0–0.05)
IMM GRANULOCYTES NFR BLD AUTO: 0.4 % (ref 0–0.5)
LYMPHOCYTES # BLD AUTO: 1.46 10*3/MM3 (ref 0.7–3.1)
LYMPHOCYTES NFR BLD AUTO: 27.1 % (ref 19.6–45.3)
MCH RBC QN AUTO: 30.6 PG (ref 26.6–33)
MCHC RBC AUTO-ENTMCNC: 34.5 G/DL (ref 31.5–35.7)
MCV RBC AUTO: 88.8 FL (ref 79–97)
MONOCYTES # BLD AUTO: 0.43 10*3/MM3 (ref 0.1–0.9)
MONOCYTES NFR BLD AUTO: 8 % (ref 5–12)
NEUTROPHILS NFR BLD AUTO: 3.31 10*3/MM3 (ref 1.7–7)
NEUTROPHILS NFR BLD AUTO: 61.6 % (ref 42.7–76)
NRBC BLD AUTO-RTO: 0 /100 WBC (ref 0–0.2)
PLATELET # BLD AUTO: 234 10*3/MM3 (ref 140–450)
PMV BLD AUTO: 9.4 FL (ref 6–12)
POTASSIUM SERPL-SCNC: 4.4 MMOL/L (ref 3.5–5.2)
PROT SERPL-MCNC: 6.8 G/DL (ref 6–8.5)
RBC # BLD AUTO: 3.92 10*6/MM3 (ref 3.77–5.28)
SODIUM SERPL-SCNC: 141 MMOL/L (ref 136–145)
T4 FREE SERPL-MCNC: 1.53 NG/DL (ref 0.92–1.68)
TSH SERPL DL<=0.05 MIU/L-ACNC: 0.87 UIU/ML (ref 0.27–4.2)
WBC NRBC COR # BLD AUTO: 5.38 10*3/MM3 (ref 3.4–10.8)

## 2024-11-20 PROCEDURE — 80053 COMPREHEN METABOLIC PANEL: CPT | Performed by: INTERNAL MEDICINE

## 2024-11-20 PROCEDURE — 85025 COMPLETE CBC W/AUTO DIFF WBC: CPT | Performed by: INTERNAL MEDICINE

## 2024-11-20 PROCEDURE — 96413 CHEMO IV INFUSION 1 HR: CPT

## 2024-11-20 PROCEDURE — 25810000003 SODIUM CHLORIDE 0.9 % SOLUTION 250 ML FLEX CONT: Performed by: INTERNAL MEDICINE

## 2024-11-20 PROCEDURE — 25010000002 DURVALUMAB 50 MG/ML SOLUTION 10 ML VIAL: Performed by: INTERNAL MEDICINE

## 2024-11-20 PROCEDURE — 99214 OFFICE O/P EST MOD 30 MIN: CPT | Performed by: INTERNAL MEDICINE

## 2024-11-20 PROCEDURE — 1126F AMNT PAIN NOTED NONE PRSNT: CPT | Performed by: INTERNAL MEDICINE

## 2024-11-20 PROCEDURE — 84443 ASSAY THYROID STIM HORMONE: CPT | Performed by: INTERNAL MEDICINE

## 2024-11-20 PROCEDURE — 84439 ASSAY OF FREE THYROXINE: CPT | Performed by: INTERNAL MEDICINE

## 2024-11-20 RX ORDER — SODIUM CHLORIDE 9 MG/ML
20 INJECTION, SOLUTION INTRAVENOUS ONCE
OUTPATIENT
Start: 2024-12-18

## 2024-11-20 RX ORDER — SODIUM CHLORIDE 9 MG/ML
20 INJECTION, SOLUTION INTRAVENOUS ONCE
Status: CANCELLED | OUTPATIENT
Start: 2024-11-20

## 2024-11-20 RX ORDER — SODIUM CHLORIDE 9 MG/ML
20 INJECTION, SOLUTION INTRAVENOUS ONCE
Status: DISCONTINUED | OUTPATIENT
Start: 2024-11-20 | End: 2024-11-21 | Stop reason: HOSPADM

## 2024-11-20 RX ADMIN — SODIUM CHLORIDE 1500 MG: 9 INJECTION, SOLUTION INTRAVENOUS at 11:50

## 2024-11-20 NOTE — PROGRESS NOTES
DATE OF VISIT: 11/20/2024    REASON FOR VISIT: Followup for right lower lobe small cell lung cancer     PROBLEM LIST:  1. Right lower lobe small cell lung cancer T1 N1 M0 stage IIb:  A.  Presented with ataxia and lower extremities weakness  B.  Negative bronchoscopy August 19, 2020 and endobronchial ultrasound September 9, 2020  C.  CT chest done December 7, 2020 revealed 2.7 cm right hilar mass  D.  Whole-body PET scan done January 11, 2020 revealed isolated hypermetabolic activity at the right hilar mass SUV of 12  E. positive serology for AGN A-1 antibody which is 92% predicted for small cell lung cancer, positive N type calcium channel blocker and P/Q type calcium channel blooker.  F.  Started chest radiation February 26, 2021, completed end of March 2021  G.  Repeated PET scan done June 08, 2023 revealed complete resolution of activity in the chest however new portacaval hypermetabolic mass.  Biopsy-proven small cell cancer July 12, 2023  H.  Treated with definitive radiotherapy, completed July 2023.  I.  Progressive disease with worsening para-aortic lymphadenopathy document CT scans in February 1, 2024.  J.  Started chemotherapy with carboplatin etoposide and Imfinzi February 2024, Status post 4 cycles.  K.  Started maintenance Imfinzi May 8, 2024.  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  Hypertension  4.  Hypercholesteremia      HISTORY OF PRESENT ILLNESS: The patient is a very pleasant 64 y.o. female  with past medical history significant for suspected small cell lung cancer of the right lung. The patient never had a positive biopsy however her serology was suggestive of SCLCA after presenting with Lambert-Eaton syndrome. The patient is here today for scheduled follow up visit with treatment maintenance therapy, Imfinzi cycle #7.      SUBJECTIVE: Gayatri is here today with her .  All in all she is doing well.  Denies fever chills night sweats.  She is anxious  about the scan results.      Past History:  Medical History: has a past medical history of Arthritis, Disease of thyroid gland, Heart disease, History of radiation therapy (08/11/2023), Hyperlipidemia, Lambert-Eaton syndrome, Lung cancer (02/2021), Metastatic cancer, Pneumonia, and Requires supplemental oxygen.   Surgical History: has a past surgical history that includes Hysterectomy; Foot surgery; and Knee surgery.   Family History: family history includes Brain cancer in her father; Breast cancer in her sister; Heart disease in her brother, mother, and sister; Kidney disease in her mother.   Social History: reports that she quit smoking about 2 years ago. Her smoking use included cigarettes. She started smoking about 17 years ago. She has a 15 pack-year smoking history. She has never used smokeless tobacco. She reports that she does not drink alcohol and does not use drugs.    (Not in a hospital admission)     Allergies: Erythromycin     Review of Systems   Constitutional:  Positive for fatigue.   Respiratory: Negative.     Gastrointestinal: Negative.    Musculoskeletal:  Positive for gait problem.   Neurological:  Positive for dizziness and weakness.         Current Outpatient Medications:     Amifampridine Phosphate (Firdapse) 10 MG tablet, Take 2 tablets by mouth 4 (Four) Times a Day., Disp: , Rfl:     aspirin 81 MG EC tablet, Take 1 tablet by mouth Daily. 7-6-23 last dose, Disp: , Rfl:     furosemide (LASIX) 40 MG tablet, Take 1 tablet by mouth Daily., Disp: , Rfl:     ipratropium-albuterol (DUO-NEB) 0.5-2.5 mg/3 ml nebulizer, , Disp: , Rfl:     levothyroxine (SYNTHROID, LEVOTHROID) 75 MCG tablet, Take 1 tablet by mouth Daily., Disp: , Rfl:     metoprolol tartrate (LOPRESSOR) 50 MG tablet, Take 0.5 tablets by mouth Daily As Needed. Patient states she takes as needed., Disp: , Rfl:     multivitamin with minerals tablet tablet, Take 1 tablet by mouth Daily., Disp: 30 each, Rfl: 0    potassium chloride  "(K-DUR,KLOR-CON) 20 MEQ CR tablet, Take 1 tablet by mouth Daily., Disp: , Rfl:     spironolactone (ALDACTONE) 25 MG tablet, Take 1 tablet by mouth Daily., Disp: , Rfl:     PHYSICAL EXAMINATION:   /86   Pulse 94   Temp 97.7 °F (36.5 °C)   Resp 18   Ht 167 cm (65.75\")   Wt 68 kg (150 lb)   SpO2 97%   BMI 24.40 kg/m²    Pain Score    11/20/24 1018   PainSc: 0-No pain                              ECOG Performance Status: 2 - Symptomatic, <50% confined to bed  General Appearance:  alert, cooperative, no apparent distress and appears stated age   Neurologic/Psychiatric: A&O x 3, gait steady, appropriate affect, strength 5/5 in all muscle groups   HEENT:  Normocephalic, without obvious abnormality, mucous membranes moist   Neck: Supple, symmetrical, trachea midline, no adenopathy;  No thyromegaly, masses, or tenderness   Lungs:   Clear to auscultation bilaterally; respirations regular, even, and unlabored bilaterally   Heart:  Regular rate and rhythm, no murmurs appreciated   Abdomen:   Soft, non-tender, and non-distended   Lymph nodes: No cervical, supraclavicular, inguinal or axillary adenopathy noted   Extremities: Normal, atraumatic; no clubbing, cyanosis, or edema    Skin: No rashes, ulcers, or suspicious lesions noted     No visits with results within 2 Week(s) from this visit.   Latest known visit with results is:   Hospital Outpatient Visit on 10/23/2024   Component Date Value Ref Range Status    Glucose 10/23/2024 85  65 - 99 mg/dL Final    BUN 10/23/2024 10  8 - 23 mg/dL Final    Creatinine 10/23/2024 0.81  0.57 - 1.00 mg/dL Final    Sodium 10/23/2024 141  136 - 145 mmol/L Final    Potassium 10/23/2024 4.1  3.5 - 5.2 mmol/L Final    Chloride 10/23/2024 103  98 - 107 mmol/L Final    CO2 10/23/2024 27.6  22.0 - 29.0 mmol/L Final    Calcium 10/23/2024 9.6  8.6 - 10.5 mg/dL Final    Total Protein 10/23/2024 7.1  6.0 - 8.5 g/dL Final    Albumin 10/23/2024 4.3  3.5 - 5.2 g/dL Final    ALT (SGPT) 10/23/2024 " 37 (H)  1 - 33 U/L Final    AST (SGOT) 10/23/2024 34 (H)  1 - 32 U/L Final    Alkaline Phosphatase 10/23/2024 129 (H)  39 - 117 U/L Final    Total Bilirubin 10/23/2024 0.3  0.0 - 1.2 mg/dL Final    Globulin 10/23/2024 2.8  gm/dL Final    A/G Ratio 10/23/2024 1.5  g/dL Final    BUN/Creatinine Ratio 10/23/2024 12.3  7.0 - 25.0 Final    Anion Gap 10/23/2024 10.4  5.0 - 15.0 mmol/L Final    eGFR 10/23/2024 81.7  >60.0 mL/min/1.73 Final    TSH 10/23/2024 8.010 (H)  0.270 - 4.200 uIU/mL Final    Free T4 10/23/2024 1.21  0.92 - 1.68 ng/dL Final    WBC 10/23/2024 4.85  3.40 - 10.80 10*3/mm3 Final    RBC 10/23/2024 4.13  3.77 - 5.28 10*6/mm3 Final    Hemoglobin 10/23/2024 12.3  12.0 - 15.9 g/dL Final    Hematocrit 10/23/2024 37.1  34.0 - 46.6 % Final    MCV 10/23/2024 89.8  79.0 - 97.0 fL Final    MCH 10/23/2024 29.8  26.6 - 33.0 pg Final    MCHC 10/23/2024 33.2  31.5 - 35.7 g/dL Final    RDW 10/23/2024 16.0 (H)  12.3 - 15.4 % Final    RDW-SD 10/23/2024 53.1  37.0 - 54.0 fl Final    MPV 10/23/2024 9.4  6.0 - 12.0 fL Final    Platelets 10/23/2024 236  140 - 450 10*3/mm3 Final    Neutrophil % 10/23/2024 58.4  42.7 - 76.0 % Final    Lymphocyte % 10/23/2024 29.7  19.6 - 45.3 % Final    Monocyte % 10/23/2024 9.9  5.0 - 12.0 % Final    Eosinophil % 10/23/2024 0.8  0.3 - 6.2 % Final    Basophil % 10/23/2024 0.8  0.0 - 1.5 % Final    Immature Grans % 10/23/2024 0.4  0.0 - 0.5 % Final    Neutrophils, Absolute 10/23/2024 2.83  1.70 - 7.00 10*3/mm3 Final    Lymphocytes, Absolute 10/23/2024 1.44  0.70 - 3.10 10*3/mm3 Final    Monocytes, Absolute 10/23/2024 0.48  0.10 - 0.90 10*3/mm3 Final    Eosinophils, Absolute 10/23/2024 0.04  0.00 - 0.40 10*3/mm3 Final    Basophils, Absolute 10/23/2024 0.04  0.00 - 0.20 10*3/mm3 Final    Immature Grans, Absolute 10/23/2024 0.02  0.00 - 0.05 10*3/mm3 Final    nRBC 10/23/2024 0.0  0.0 - 0.2 /100 WBC Final        CT Soft Tissue Neck With Contrast    Result Date: 11/19/2024  Narrative: CT SOFT TISSUE  NECK W CONTRAST Date of Exam: 11/19/2024 8:19 AM EST Indication: f/u scans lung cancer. Comparison: 7/29/2024. Technique: Axial CT images were obtained of the neck after the uneventful intravenous administration of 80 mL Isovue-300.  Reconstructed coronal and sagittal images were also obtained. Automated exposure control and iterative construction methods were used. Findings: Limited intracranial evaluation demonstrates no acute findings. The orbits are normal and the paranasal sinuses appear grossly clear. There is no evidence of aerodigestive tract mass or other focal luminal abnormality. Vascular structures redemonstrates some minimal atherosclerotic change without associated luminal narrowing. The parotid and submandibular glands appear normal and symmetric. No distinct new or enlarging suspicious lymphadenopathy is present. The osseous structures demonstrate some mild spondylosis, otherwise without evidence of acute fracture or aggressive osseous lesion.     Impression: Impression: Stable CT appearance of the neck without evidence of metastatic involvement or other unexpected finding. Electronically Signed: Ranjith Zarate MD  11/19/2024 3:09 PM EST  Workstation ID: [id number]    XR Chest 1 View    Result Date: 10/30/2024  Narrative: PORTABLE CHEST    10/30/2024 11:45 AM HISTORY:  Precordial chest pain . COMPARISON: August 9, 2024. FINDINGS: The heart is  normal in size .  The mediastinum is unremarkable .  The lungs are clear .  There is no pneumothorax . The osseous structures  are unremarkable .    Impression: No acute cardiopulmonary process . Images reviewed, interpreted, and dictated by Dr. Ezequiel Shah. Transcribed by Demian Bach PA-C.       ASSESSMENT: The patient is a very pleasant 64 y.o. female  with small cell lung cancer      PLAN:    1. Right lower lobe small cell lung cancer TxN2 M1b stage [place]:  A.  I will proceed with treatment as scheduled today maintenance immunotherapy Imfinzi cycle  #7.  B.  She will follow-up with us in 4 weeks for cycle #8.  C. We reviewed potential side effects of immunotherapy including but not limited to immune mediated reactions with thyroiditis, pneumonitis, hepatitis, colitis, rash, and electrolyte abnormalities, fatigue, multiorgan failure, and possibly death.  D.  I will continue to monitor the patient blood work including blood counts kidney function liver function and electrolytes.  E.  I did go over the scan results with the patient from November 19, 2024.  I reviewed the films myself.  Compare the current pictures to previous study done September 19, 2024.  No evidence of new or relapsed disease.  There is still resolution of her left para-aortic lymphadenopathy.  Will follow-up on the official read.  I will do 4 months follow-up study which will be due mid March 2025.    2. Lambert-Eaton syndrome:  A. She will continue Firdapse 10 mg 4 times daily as prescribed by neurology.  B.  I do think this should be a contraindication for her immunotherapy since chemotherapy with treating the underlying cause for her neurologic syndrome.  C.  They are considering plasma exchange once chemotherapy has completed.    3. Hypothyroid:  A. She will continue Synthroid daily    4.  Poor systolic cardiac function:  A.  Status post defibrillator placement.    5.  Dizziness:  A.  MRI on 2/12/2024 showed negative for metastatic disease.    6.  Chemotherapy-induced nausea:  A.  I will continue Zofran as needed.    FOLLOW UP: 4 weeks with cycle 8.      Susi Irving MD  11/20/2024

## 2024-12-06 ENCOUNTER — TELEPHONE (OUTPATIENT)
Dept: NUTRITION | Facility: HOSPITAL | Age: 64
End: 2024-12-06
Payer: MEDICARE

## 2024-12-06 NOTE — PROGRESS NOTES
Outpatient Oncology Nutrition Assessment      Patient Name:  Gayatri Kahn  YOB: 1960  MRN: [id number]      Assessment Date:  12/6/2024    Comments:  Called pt for nutrition f/up regarding oncology treatment. #. Pt reports not having any weight loss and is eating well. No difficulty with chewing/swallowing. She does not have any nutrition questions. RD to f/up.    Electronically signed by:  Veronika Yusuf RD  12/06/24 10:37 EST

## 2024-12-18 ENCOUNTER — HOSPITAL ENCOUNTER (OUTPATIENT)
Facility: HOSPITAL | Age: 64
Discharge: HOME | End: 2024-12-18
Admitting: INTERNAL MEDICINE
Payer: MEDICARE

## 2024-12-18 ENCOUNTER — OFFICE VISIT (OUTPATIENT)
Dept: ONCOLOGY | Facility: CLINIC | Age: 64
End: 2024-12-18
Payer: MEDICARE

## 2024-12-18 VITALS
HEART RATE: 117 BPM | TEMPERATURE: 97.8 F | WEIGHT: 148 LBS | SYSTOLIC BLOOD PRESSURE: 112 MMHG | BODY MASS INDEX: 23.78 KG/M2 | DIASTOLIC BLOOD PRESSURE: 65 MMHG | HEIGHT: 66 IN | OXYGEN SATURATION: 97 %

## 2024-12-18 DIAGNOSIS — C77.2 SECONDARY MALIGNANT NEOPLASM OF INTRA-ABDOMINAL LYMPH NODES: ICD-10-CM

## 2024-12-18 DIAGNOSIS — C34.31 SMALL CELL LUNG CANCER, RIGHT LOWER LOBE: Primary | ICD-10-CM

## 2024-12-18 LAB
ALBUMIN SERPL-MCNC: 4.3 G/DL (ref 3.5–5.2)
ALBUMIN/GLOB SERPL: 1.5 G/DL
ALP SERPL-CCNC: 129 U/L (ref 39–117)
ALT SERPL W P-5'-P-CCNC: 19 U/L (ref 1–33)
ANION GAP SERPL CALCULATED.3IONS-SCNC: 8.7 MMOL/L (ref 5–15)
AST SERPL-CCNC: 25 U/L (ref 1–32)
BASOPHILS # BLD AUTO: 0.03 10*3/MM3 (ref 0–0.2)
BASOPHILS NFR BLD AUTO: 0.6 % (ref 0–1.5)
BILIRUB SERPL-MCNC: 0.3 MG/DL (ref 0–1.2)
BUN SERPL-MCNC: 7 MG/DL (ref 8–23)
BUN/CREAT SERPL: 9.6 (ref 7–25)
CALCIUM SPEC-SCNC: 10.2 MG/DL (ref 8.6–10.5)
CHLORIDE SERPL-SCNC: 105 MMOL/L (ref 98–107)
CO2 SERPL-SCNC: 25.3 MMOL/L (ref 22–29)
CREAT SERPL-MCNC: 0.73 MG/DL (ref 0.57–1)
DEPRECATED RDW RBC AUTO: 43.4 FL (ref 37–54)
EGFRCR SERPLBLD CKD-EPI 2021: 92.5 ML/MIN/1.73
EOSINOPHIL # BLD AUTO: 0.07 10*3/MM3 (ref 0–0.4)
EOSINOPHIL NFR BLD AUTO: 1.4 % (ref 0.3–6.2)
ERYTHROCYTE [DISTWIDTH] IN BLOOD BY AUTOMATED COUNT: 13.2 % (ref 12.3–15.4)
GLOBULIN UR ELPH-MCNC: 2.8 GM/DL
GLUCOSE SERPL-MCNC: 136 MG/DL (ref 65–99)
HCT VFR BLD AUTO: 37.3 % (ref 34–46.6)
HGB BLD-MCNC: 12.6 G/DL (ref 12–15.9)
IMM GRANULOCYTES # BLD AUTO: 0.01 10*3/MM3 (ref 0–0.05)
IMM GRANULOCYTES NFR BLD AUTO: 0.2 % (ref 0–0.5)
LYMPHOCYTES # BLD AUTO: 1.21 10*3/MM3 (ref 0.7–3.1)
LYMPHOCYTES NFR BLD AUTO: 24.2 % (ref 19.6–45.3)
MCH RBC QN AUTO: 30.6 PG (ref 26.6–33)
MCHC RBC AUTO-ENTMCNC: 33.8 G/DL (ref 31.5–35.7)
MCV RBC AUTO: 90.5 FL (ref 79–97)
MONOCYTES # BLD AUTO: 0.41 10*3/MM3 (ref 0.1–0.9)
MONOCYTES NFR BLD AUTO: 8.2 % (ref 5–12)
NEUTROPHILS NFR BLD AUTO: 3.28 10*3/MM3 (ref 1.7–7)
NEUTROPHILS NFR BLD AUTO: 65.4 % (ref 42.7–76)
NRBC BLD AUTO-RTO: 0 /100 WBC (ref 0–0.2)
PLATELET # BLD AUTO: 233 10*3/MM3 (ref 140–450)
PMV BLD AUTO: 9 FL (ref 6–12)
POTASSIUM SERPL-SCNC: 4.4 MMOL/L (ref 3.5–5.2)
PROT SERPL-MCNC: 7.1 G/DL (ref 6–8.5)
RBC # BLD AUTO: 4.12 10*6/MM3 (ref 3.77–5.28)
SODIUM SERPL-SCNC: 139 MMOL/L (ref 136–145)
T4 FREE SERPL-MCNC: 1.51 NG/DL (ref 0.92–1.68)
TSH SERPL DL<=0.05 MIU/L-ACNC: 0.6 UIU/ML (ref 0.27–4.2)
WBC NRBC COR # BLD AUTO: 5.01 10*3/MM3 (ref 3.4–10.8)

## 2024-12-18 PROCEDURE — 84439 ASSAY OF FREE THYROXINE: CPT | Performed by: INTERNAL MEDICINE

## 2024-12-18 PROCEDURE — 25810000003 SODIUM CHLORIDE 0.9 % SOLUTION 250 ML FLEX CONT: Performed by: INTERNAL MEDICINE

## 2024-12-18 PROCEDURE — 80053 COMPREHEN METABOLIC PANEL: CPT | Performed by: INTERNAL MEDICINE

## 2024-12-18 PROCEDURE — 85025 COMPLETE CBC W/AUTO DIFF WBC: CPT | Performed by: INTERNAL MEDICINE

## 2024-12-18 PROCEDURE — 99214 OFFICE O/P EST MOD 30 MIN: CPT | Performed by: INTERNAL MEDICINE

## 2024-12-18 PROCEDURE — 25010000002 DURVALUMAB 50 MG/ML SOLUTION 10 ML VIAL: Performed by: INTERNAL MEDICINE

## 2024-12-18 PROCEDURE — 84443 ASSAY THYROID STIM HORMONE: CPT | Performed by: INTERNAL MEDICINE

## 2024-12-18 PROCEDURE — 96413 CHEMO IV INFUSION 1 HR: CPT

## 2024-12-18 RX ORDER — ATORVASTATIN CALCIUM 20 MG/1
TABLET, FILM COATED ORAL
COMMUNITY
Start: 2024-12-05

## 2024-12-18 RX ORDER — SODIUM CHLORIDE 9 MG/ML
20 INJECTION, SOLUTION INTRAVENOUS ONCE
Status: DISCONTINUED | OUTPATIENT
Start: 2024-12-18 | End: 2024-12-19 | Stop reason: HOSPADM

## 2024-12-18 RX ORDER — AZATHIOPRINE 50 MG/1
TABLET ORAL
COMMUNITY
Start: 2024-11-26

## 2024-12-18 RX ADMIN — SODIUM CHLORIDE 1500 MG: 9 INJECTION, SOLUTION INTRAVENOUS at 11:37

## 2024-12-18 NOTE — PROGRESS NOTES
DATE OF VISIT: 12/18/2024    REASON FOR VISIT: Followup for right lower lobe small cell lung cancer     PROBLEM LIST:  1. Right lower lobe small cell lung cancer T1 N1 M0 stage IIb:  A.  Presented with ataxia and lower extremities weakness  B.  Negative bronchoscopy August 19, 2020 and endobronchial ultrasound September 9, 2020  C.  CT chest done December 7, 2020 revealed 2.7 cm right hilar mass  D.  Whole-body PET scan done January 11, 2020 revealed isolated hypermetabolic activity at the right hilar mass SUV of 12  E. positive serology for AGN A-1 antibody which is 92% predicted for small cell lung cancer, positive N type calcium channel blocker and P/Q type calcium channel blooker.  F.  Started chest radiation February 26, 2021, completed end of March 2021  G.  Repeated PET scan done June 08, 2023 revealed complete resolution of activity in the chest however new portacaval hypermetabolic mass.  Biopsy-proven small cell cancer July 12, 2023  H.  Treated with definitive radiotherapy, completed July 2023.  I.  Progressive disease with worsening para-aortic lymphadenopathy document CT scans in February 1, 2024.  J.  Started chemotherapy with carboplatin etoposide and Imfinzi February 2024, Status post 4 cycles.  K.  Started maintenance Imfinzi May 8, 2024.  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  Hypertension  4.  Hypercholesteremia      HISTORY OF PRESENT ILLNESS: The patient is a very pleasant 64 y.o. female  with past medical history significant for suspected small cell lung cancer of the right lung. The patient never had a positive biopsy however her serology was suggestive of SCLCA after presenting with Lambert-Eaton syndrome. The patient is here today for scheduled follow up visit with treatment maintenance therapy, Imfinzi cycle #8.      SUBJECTIVE: Gayatri is here today with her .  Denies any fever chills or night sweats.  She continues to have  dizziness.    Past History:  Medical History: has a past medical history of Arthritis, Disease of thyroid gland, Heart disease, History of radiation therapy (08/11/2023), Hyperlipidemia, Lambert-Eaton syndrome, Lung cancer (02/2021), Metastatic cancer, Pneumonia, and Requires supplemental oxygen.   Surgical History: has a past surgical history that includes Hysterectomy; Foot surgery; and Knee surgery.   Family History: family history includes Brain cancer in her father; Breast cancer in her sister; Heart disease in her brother, mother, and sister; Kidney disease in her mother.   Social History: reports that she quit smoking about 2 years ago. Her smoking use included cigarettes. She started smoking about 17 years ago. She has a 15 pack-year smoking history. She has never used smokeless tobacco. She reports that she does not drink alcohol and does not use drugs.    (Not in a hospital admission)     Allergies: Erythromycin     Review of Systems   Constitutional:  Positive for fatigue.   Respiratory: Negative.     Gastrointestinal: Negative.    Musculoskeletal:  Positive for gait problem.   Neurological:  Positive for dizziness and weakness.         Current Outpatient Medications:     Amifampridine Phosphate (Firdapse) 10 MG tablet, Take 2 tablets by mouth 4 (Four) Times a Day., Disp: , Rfl:     aspirin 81 MG EC tablet, Take 1 tablet by mouth Daily. 7-6-23 last dose, Disp: , Rfl:     furosemide (LASIX) 40 MG tablet, Take 1 tablet by mouth Daily., Disp: , Rfl:     ipratropium-albuterol (DUO-NEB) 0.5-2.5 mg/3 ml nebulizer, , Disp: , Rfl:     levothyroxine (SYNTHROID, LEVOTHROID) 75 MCG tablet, Take 1 tablet by mouth Daily., Disp: , Rfl:     metoprolol tartrate (LOPRESSOR) 50 MG tablet, Take 0.5 tablets by mouth Daily As Needed. Patient states she takes as needed., Disp: , Rfl:     multivitamin with minerals tablet tablet, Take 1 tablet by mouth Daily., Disp: 30 each, Rfl: 0    potassium chloride (K-DUR,KLOR-CON) 20 MEQ  CR tablet, Take 1 tablet by mouth Daily., Disp: , Rfl:     spironolactone (ALDACTONE) 25 MG tablet, Take 1 tablet by mouth Daily., Disp: , Rfl:     PHYSICAL EXAMINATION:   There were no vitals taken for this visit.   There were no vitals filed for this visit.                             ECOG Performance Status: 2 - Symptomatic, <50% confined to bed  General Appearance:  alert, cooperative, no apparent distress and appears stated age   Neurologic/Psychiatric: A&O x 3, gait steady, appropriate affect, strength 5/5 in all muscle groups   HEENT:  Normocephalic, without obvious abnormality, mucous membranes moist   Neck: Supple, symmetrical, trachea midline, no adenopathy;  No thyromegaly, masses, or tenderness   Lungs:   Clear to auscultation bilaterally; respirations regular, even, and unlabored bilaterally   Heart:  Regular rate and rhythm, no murmurs appreciated   Abdomen:   Soft, non-tender, and non-distended   Lymph nodes: No cervical, supraclavicular, inguinal or axillary adenopathy noted   Extremities: Normal, atraumatic; no clubbing, cyanosis, or edema    Skin: No rashes, ulcers, or suspicious lesions noted     No visits with results within 2 Week(s) from this visit.   Latest known visit with results is:   Hospital Outpatient Visit on 11/20/2024   Component Date Value Ref Range Status    Glucose 11/20/2024 106 (H)  65 - 99 mg/dL Final    BUN 11/20/2024 10  8 - 23 mg/dL Final    Creatinine 11/20/2024 0.75  0.57 - 1.00 mg/dL Final    Sodium 11/20/2024 141  136 - 145 mmol/L Final    Potassium 11/20/2024 4.4  3.5 - 5.2 mmol/L Final    Slight hemolysis detected by analyzer. Result may be falsely elevated.    Chloride 11/20/2024 105  98 - 107 mmol/L Final    CO2 11/20/2024 25.3  22.0 - 29.0 mmol/L Final    Calcium 11/20/2024 9.9  8.6 - 10.5 mg/dL Final    Total Protein 11/20/2024 6.8  6.0 - 8.5 g/dL Final    Albumin 11/20/2024 4.1  3.5 - 5.2 g/dL Final    ALT (SGPT) 11/20/2024 21  1 - 33 U/L Final    AST (SGOT)  11/20/2024 30  1 - 32 U/L Final    Alkaline Phosphatase 11/20/2024 109  39 - 117 U/L Final    Total Bilirubin 11/20/2024 0.2  0.0 - 1.2 mg/dL Final    Globulin 11/20/2024 2.7  gm/dL Final    A/G Ratio 11/20/2024 1.5  g/dL Final    BUN/Creatinine Ratio 11/20/2024 13.3  7.0 - 25.0 Final    Anion Gap 11/20/2024 10.7  5.0 - 15.0 mmol/L Final    eGFR 11/20/2024 89.6  >60.0 mL/min/1.73 Final    TSH 11/20/2024 0.866  0.270 - 4.200 uIU/mL Final    Free T4 11/20/2024 1.53  0.92 - 1.68 ng/dL Final    WBC 11/20/2024 5.38  3.40 - 10.80 10*3/mm3 Final    RBC 11/20/2024 3.92  3.77 - 5.28 10*6/mm3 Final    Hemoglobin 11/20/2024 12.0  12.0 - 15.9 g/dL Final    Hematocrit 11/20/2024 34.8  34.0 - 46.6 % Final    MCV 11/20/2024 88.8  79.0 - 97.0 fL Final    MCH 11/20/2024 30.6  26.6 - 33.0 pg Final    MCHC 11/20/2024 34.5  31.5 - 35.7 g/dL Final    RDW 11/20/2024 13.8  12.3 - 15.4 % Final    RDW-SD 11/20/2024 45.1  37.0 - 54.0 fl Final    MPV 11/20/2024 9.4  6.0 - 12.0 fL Final    Platelets 11/20/2024 234  140 - 450 10*3/mm3 Final    Neutrophil % 11/20/2024 61.6  42.7 - 76.0 % Final    Lymphocyte % 11/20/2024 27.1  19.6 - 45.3 % Final    Monocyte % 11/20/2024 8.0  5.0 - 12.0 % Final    Eosinophil % 11/20/2024 2.0  0.3 - 6.2 % Final    Basophil % 11/20/2024 0.9  0.0 - 1.5 % Final    Immature Grans % 11/20/2024 0.4  0.0 - 0.5 % Final    Neutrophils, Absolute 11/20/2024 3.31  1.70 - 7.00 10*3/mm3 Final    Lymphocytes, Absolute 11/20/2024 1.46  0.70 - 3.10 10*3/mm3 Final    Monocytes, Absolute 11/20/2024 0.43  0.10 - 0.90 10*3/mm3 Final    Eosinophils, Absolute 11/20/2024 0.11  0.00 - 0.40 10*3/mm3 Final    Basophils, Absolute 11/20/2024 0.05  0.00 - 0.20 10*3/mm3 Final    Immature Grans, Absolute 11/20/2024 0.02  0.00 - 0.05 10*3/mm3 Final    nRBC 11/20/2024 0.0  0.0 - 0.2 /100 WBC Final        CT Chest With Contrast Diagnostic    Result Date: 11/21/2024  Narrative: CT ABDOMEN PELVIS W CONTRAST, CT CHEST W CONTRAST DIAGNOSTIC Date of  Exam: 11/19/2024 8:19 AM EST Indication: Lung cancer. Observation of malignant neoplasm. Comparison: 7/29/2024 Technique: Axial CT images were obtained of the chest, abdomen, and pelvis following the uneventful intravenous administration of contrast. Oral contrast was also administered. Reconstructed coronal and sagittal images were also obtained. Automated exposure control and iterative construction methods were used. Findings: CHEST: Please see neck CT report dictated separately. No pleural or pericardial effusion. There is no adenopathy. Lung windows demonstrate emphysema. Right perihilar soft tissue changes appear stable. A right subpleural subsolid nodule in the lower lobe on image 35 measures 7 mm, stable. Right middle lobe nodule on image 67 measures 6 mm and is not significantly changed allowing for differences in slice selection. No new or enlarging pulmonary nodules are seen. There are no acute infiltrates. There is an old right clavicle fracture. No suspicious osseous lesions. ABDOMEN/PELVIS: There is atherosclerotic disease with no aortic aneurysm. Gallbladder unremarkable. No biliary obstruction. A 14 mm left adrenal nodule is unchanged. Solid abdominal organs are otherwise normal. The kidneys are nonobstructed. Urinary bladder is normal. Uterus is surgically absent. The appendix is normal. Large bowel is normal with no evidence of acute colitis. No small bowel obstruction is seen. Stomach is normal. There is no adenopathy or free fluid. No suspicious osseous lesions are identified.     Impression: Stable appearance of the chest, abdomen, and pelvis. Electronically Signed: Danny Wood MD  11/21/2024 5:37 PM EST  Workstation ID: [id number]    CT Abdomen Pelvis With Contrast    Result Date: 11/21/2024  Narrative: CT ABDOMEN PELVIS W CONTRAST, CT CHEST W CONTRAST DIAGNOSTIC Date of Exam: 11/19/2024 8:19 AM EST Indication: Lung cancer. Observation of malignant neoplasm. Comparison: 7/29/2024 Technique:  Axial CT images were obtained of the chest, abdomen, and pelvis following the uneventful intravenous administration of contrast. Oral contrast was also administered. Reconstructed coronal and sagittal images were also obtained. Automated exposure control and iterative construction methods were used. Findings: CHEST: Please see neck CT report dictated separately. No pleural or pericardial effusion. There is no adenopathy. Lung windows demonstrate emphysema. Right perihilar soft tissue changes appear stable. A right subpleural subsolid nodule in the lower lobe on image 35 measures 7 mm, stable. Right middle lobe nodule on image 67 measures 6 mm and is not significantly changed allowing for differences in slice selection. No new or enlarging pulmonary nodules are seen. There are no acute infiltrates. There is an old right clavicle fracture. No suspicious osseous lesions. ABDOMEN/PELVIS: There is atherosclerotic disease with no aortic aneurysm. Gallbladder unremarkable. No biliary obstruction. A 14 mm left adrenal nodule is unchanged. Solid abdominal organs are otherwise normal. The kidneys are nonobstructed. Urinary bladder is normal. Uterus is surgically absent. The appendix is normal. Large bowel is normal with no evidence of acute colitis. No small bowel obstruction is seen. Stomach is normal. There is no adenopathy or free fluid. No suspicious osseous lesions are identified.     Impression: Stable appearance of the chest, abdomen, and pelvis. Electronically Signed: Danny Wood MD  11/21/2024 5:37 PM EST  Workstation ID: [id number]    CT Soft Tissue Neck With Contrast    Result Date: 11/19/2024  Narrative: CT SOFT TISSUE NECK W CONTRAST Date of Exam: 11/19/2024 8:19 AM EST Indication: f/u scans lung cancer. Comparison: 7/29/2024. Technique: Axial CT images were obtained of the neck after the uneventful intravenous administration of 80 mL Isovue-300.  Reconstructed coronal and sagittal images were also obtained.  Automated exposure control and iterative construction methods were used. Findings: Limited intracranial evaluation demonstrates no acute findings. The orbits are normal and the paranasal sinuses appear grossly clear. There is no evidence of aerodigestive tract mass or other focal luminal abnormality. Vascular structures redemonstrates some minimal atherosclerotic change without associated luminal narrowing. The parotid and submandibular glands appear normal and symmetric. No distinct new or enlarging suspicious lymphadenopathy is present. The osseous structures demonstrate some mild spondylosis, otherwise without evidence of acute fracture or aggressive osseous lesion.     Impression: Impression: Stable CT appearance of the neck without evidence of metastatic involvement or other unexpected finding. Electronically Signed: Ranjith Zarate MD  11/19/2024 3:09 PM EST  Workstation ID: [id number]       ASSESSMENT: The patient is a very pleasant 64 y.o. female  with small cell lung cancer      PLAN:    1. Right lower lobe small cell lung cancer TxN2 M1b stage [place]:  A.  I will proceed with treatment as scheduled today maintenance immunotherapy Imfinzi cycle #8.  B.  She will follow-up with us in 4 weeks for cycle #9.  C. We reviewed potential side effects of immunotherapy including but not limited to immune mediated reactions with thyroiditis, pneumonitis, hepatitis, colitis, rash, and electrolyte abnormalities, fatigue, multiorgan failure, and possibly death.  D.  I will continue to monitor the patient blood work including blood counts kidney function liver function and electrolytes.  E.  I will do 4 months follow-up study which will be due mid March 2025.  F.  I discussed with the patient her blood results from November 20, 2024 CBC and CMP look normal.  I will follow-up on labs from today.    2. Lambert-Eaton syndrome:  A. She will continue Firdapse 10 mg 4 times daily as prescribed by neurology.  B.  I do think this  should be a contraindication for her immunotherapy since chemotherapy with treating the underlying cause for her neurologic syndrome.  C.  They are considering plasma exchange once chemotherapy has completed.    3. Hypothyroid:  A. She will continue Synthroid daily    4.  Poor systolic cardiac function:  A.  Status post defibrillator placement.    5.  Dizziness:  A.  MRI on 2/12/2024 showed negative for metastatic disease.    6.  Chemotherapy-induced nausea:  A.  I will continue Zofran as needed.    FOLLOW UP: 4 weeks with cycle 9.      Susi Irving MD  12/18/2024

## 2025-01-07 ENCOUNTER — TELEPHONE (OUTPATIENT)
Dept: NUTRITION | Facility: HOSPITAL | Age: 65
End: 2025-01-07
Payer: MEDICARE

## 2025-01-07 NOTE — PROGRESS NOTES
Outpatient Oncology Nutrition Follow-up      Patient Name: Gayatri Kahn  YOB: 1960  MRN: [id number]      Follow-up Date:  01/07/25     Comments:      Spoke with patient over the phone for nutrition follow-up regarding oncology treatment. Patient denies any recent weight loss or appetite changes. Patient denies any nutrition-related question/concerns at this time. RD to follow-up within one month and available PRN.    Electronically signed by:   Marni Nails RD  01/07/25 14:01 EST

## 2025-01-15 ENCOUNTER — HOSPITAL ENCOUNTER (OUTPATIENT)
Facility: HOSPITAL | Age: 65
Discharge: HOME | End: 2025-01-15
Admitting: NURSE PRACTITIONER
Payer: MEDICARE

## 2025-01-15 ENCOUNTER — OFFICE VISIT (OUTPATIENT)
Dept: ONCOLOGY | Facility: CLINIC | Age: 65
End: 2025-01-15
Payer: MEDICARE

## 2025-01-15 VITALS
TEMPERATURE: 97.7 F | HEIGHT: 66 IN | DIASTOLIC BLOOD PRESSURE: 69 MMHG | WEIGHT: 145.1 LBS | HEART RATE: 92 BPM | SYSTOLIC BLOOD PRESSURE: 116 MMHG | BODY MASS INDEX: 23.32 KG/M2 | RESPIRATION RATE: 18 BRPM | OXYGEN SATURATION: 97 %

## 2025-01-15 DIAGNOSIS — C77.2 SECONDARY MALIGNANT NEOPLASM OF INTRA-ABDOMINAL LYMPH NODES: Primary | ICD-10-CM

## 2025-01-15 DIAGNOSIS — C77.2 SECONDARY MALIGNANT NEOPLASM OF INTRA-ABDOMINAL LYMPH NODES: ICD-10-CM

## 2025-01-15 DIAGNOSIS — C34.31 SMALL CELL LUNG CANCER, RIGHT LOWER LOBE: ICD-10-CM

## 2025-01-15 DIAGNOSIS — C34.31 SMALL CELL LUNG CANCER, RIGHT LOWER LOBE: Primary | ICD-10-CM

## 2025-01-15 LAB
ALBUMIN SERPL-MCNC: 4.5 G/DL (ref 3.5–5.2)
ALBUMIN/GLOB SERPL: 1.5 G/DL
ALP SERPL-CCNC: 560 U/L (ref 39–117)
ALT SERPL W P-5'-P-CCNC: 291 U/L (ref 1–33)
ANION GAP SERPL CALCULATED.3IONS-SCNC: 8.9 MMOL/L (ref 5–15)
AST SERPL-CCNC: 444 U/L (ref 1–32)
BASOPHILS # BLD AUTO: 0.03 10*3/MM3 (ref 0–0.2)
BASOPHILS NFR BLD AUTO: 0.7 % (ref 0–1.5)
BILIRUB SERPL-MCNC: 0.7 MG/DL (ref 0–1.2)
BUN SERPL-MCNC: 7 MG/DL (ref 8–23)
BUN/CREAT SERPL: 10.3 (ref 7–25)
CALCIUM SPEC-SCNC: 10.1 MG/DL (ref 8.6–10.5)
CHLORIDE SERPL-SCNC: 102 MMOL/L (ref 98–107)
CO2 SERPL-SCNC: 27.1 MMOL/L (ref 22–29)
CREAT SERPL-MCNC: 0.68 MG/DL (ref 0.57–1)
DEPRECATED RDW RBC AUTO: 43.8 FL (ref 37–54)
EGFRCR SERPLBLD CKD-EPI 2021: 98 ML/MIN/1.73
EOSINOPHIL # BLD AUTO: 0.07 10*3/MM3 (ref 0–0.4)
EOSINOPHIL NFR BLD AUTO: 1.6 % (ref 0.3–6.2)
ERYTHROCYTE [DISTWIDTH] IN BLOOD BY AUTOMATED COUNT: 13.3 % (ref 12.3–15.4)
GLOBULIN UR ELPH-MCNC: 3 GM/DL
GLUCOSE SERPL-MCNC: 110 MG/DL (ref 65–99)
HCT VFR BLD AUTO: 39.9 % (ref 34–46.6)
HGB BLD-MCNC: 13.7 G/DL (ref 12–15.9)
IMM GRANULOCYTES # BLD AUTO: 0.03 10*3/MM3 (ref 0–0.05)
IMM GRANULOCYTES NFR BLD AUTO: 0.7 % (ref 0–0.5)
LYMPHOCYTES # BLD AUTO: 0.9 10*3/MM3 (ref 0.7–3.1)
LYMPHOCYTES NFR BLD AUTO: 20.1 % (ref 19.6–45.3)
MCH RBC QN AUTO: 30.6 PG (ref 26.6–33)
MCHC RBC AUTO-ENTMCNC: 34.3 G/DL (ref 31.5–35.7)
MCV RBC AUTO: 89.3 FL (ref 79–97)
MONOCYTES # BLD AUTO: 0.35 10*3/MM3 (ref 0.1–0.9)
MONOCYTES NFR BLD AUTO: 7.8 % (ref 5–12)
NEUTROPHILS NFR BLD AUTO: 3.09 10*3/MM3 (ref 1.7–7)
NEUTROPHILS NFR BLD AUTO: 69.1 % (ref 42.7–76)
NRBC BLD AUTO-RTO: 0 /100 WBC (ref 0–0.2)
PLATELET # BLD AUTO: 282 10*3/MM3 (ref 140–450)
PMV BLD AUTO: 9.3 FL (ref 6–12)
POTASSIUM SERPL-SCNC: 4.1 MMOL/L (ref 3.5–5.2)
PROT SERPL-MCNC: 7.5 G/DL (ref 6–8.5)
RBC # BLD AUTO: 4.47 10*6/MM3 (ref 3.77–5.28)
SODIUM SERPL-SCNC: 138 MMOL/L (ref 136–145)
T4 FREE SERPL-MCNC: 1.53 NG/DL (ref 0.92–1.68)
TSH SERPL DL<=0.05 MIU/L-ACNC: 3.1 UIU/ML (ref 0.27–4.2)
WBC NRBC COR # BLD AUTO: 4.47 10*3/MM3 (ref 3.4–10.8)

## 2025-01-15 PROCEDURE — 96413 CHEMO IV INFUSION 1 HR: CPT

## 2025-01-15 PROCEDURE — 99214 OFFICE O/P EST MOD 30 MIN: CPT | Performed by: NURSE PRACTITIONER

## 2025-01-15 PROCEDURE — 25810000003 SODIUM CHLORIDE 0.9 % SOLUTION 250 ML FLEX CONT: Performed by: NURSE PRACTITIONER

## 2025-01-15 PROCEDURE — 85025 COMPLETE CBC W/AUTO DIFF WBC: CPT | Performed by: NURSE PRACTITIONER

## 2025-01-15 PROCEDURE — 25010000002 DURVALUMAB 50 MG/ML SOLUTION 10 ML VIAL: Performed by: NURSE PRACTITIONER

## 2025-01-15 PROCEDURE — 84439 ASSAY OF FREE THYROXINE: CPT | Performed by: NURSE PRACTITIONER

## 2025-01-15 PROCEDURE — 1126F AMNT PAIN NOTED NONE PRSNT: CPT | Performed by: NURSE PRACTITIONER

## 2025-01-15 PROCEDURE — 80053 COMPREHEN METABOLIC PANEL: CPT | Performed by: NURSE PRACTITIONER

## 2025-01-15 PROCEDURE — 84443 ASSAY THYROID STIM HORMONE: CPT | Performed by: NURSE PRACTITIONER

## 2025-01-15 RX ORDER — SODIUM CHLORIDE 9 MG/ML
20 INJECTION, SOLUTION INTRAVENOUS ONCE
Status: DISCONTINUED | OUTPATIENT
Start: 2025-01-15 | End: 2025-01-16 | Stop reason: HOSPADM

## 2025-01-15 RX ORDER — SODIUM CHLORIDE 9 MG/ML
20 INJECTION, SOLUTION INTRAVENOUS ONCE
Status: CANCELLED | OUTPATIENT
Start: 2025-01-15

## 2025-01-15 RX ADMIN — DURVALUMAB 1500 MG: 500 INJECTION, SOLUTION INTRAVENOUS at 12:08

## 2025-01-15 NOTE — PROGRESS NOTES
DATE OF VISIT: 1/15/2025    REASON FOR VISIT: Followup for right lower lobe small cell lung cancer     PROBLEM LIST:  1. Right lower lobe small cell lung cancer T1 N1 M0 stage IIb:  A.  Presented with ataxia and lower extremities weakness  B.  Negative bronchoscopy August 19, 2020 and endobronchial ultrasound September 9, 2020  C.  CT chest done December 7, 2020 revealed 2.7 cm right hilar mass  D.  Whole-body PET scan done January 11, 2020 revealed isolated hypermetabolic activity at the right hilar mass SUV of 12  E. positive serology for AGN A-1 antibody which is 92% predicted for small cell lung cancer, positive N type calcium channel blocker and P/Q type calcium channel blooker.  F.  Started chest radiation February 26, 2021, completed end of March 2021  G.  Repeated PET scan done June 08, 2023 revealed complete resolution of activity in the chest however new portacaval hypermetabolic mass.  Biopsy-proven small cell cancer July 12, 2023  H.  Treated with definitive radiotherapy, completed July 2023.  I.  Progressive disease with worsening para-aortic lymphadenopathy document CT scans in February 1, 2024.  J.  Started chemotherapy with carboplatin etoposide and Imfinzi February 2024, Status post 4 cycles.  K.  Started maintenance Imfinzi May 8, 2024.  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  Hypertension  4.  Hypercholesteremia      HISTORY OF PRESENT ILLNESS: The patient is a very pleasant 64 y.o. female with past medical history significant for suspected small cell lung cancer of the right lung. The patient never had a positive biopsy however her serology was suggestive of SCLCA after presenting with Lambert-Eaton syndrome. The patient is here today for scheduled follow up visit with treatment maintenance therapy, Imfinzi cycle #9.      SUBJECTIVE: Gayatri is here today with her .  Overall doing fairly well.  She was started on MRI in approximately 3 weeks ago.   She has had worsening dizziness since starting on the 3 pills daily.  They are planning to call the neurologist for discussion.  She is scheduled to have an MRI in 1 month.  Denies any fever, chills, night sweats.         Past History:  Medical History: has a past medical history of Arthritis, Disease of thyroid gland, Heart disease, History of radiation therapy (08/11/2023), Hyperlipidemia, Lambert-Eaton syndrome, Lung cancer (02/2021), Metastatic cancer, Pneumonia, and Requires supplemental oxygen.   Surgical History: has a past surgical history that includes Hysterectomy; Foot surgery; and Knee surgery.   Family History: family history includes Brain cancer in her father; Breast cancer in her sister; Heart disease in her brother, mother, and sister; Kidney disease in her mother.   Social History: reports that she quit smoking about 2 years ago. Her smoking use included cigarettes. She started smoking about 17 years ago. She has a 15 pack-year smoking history. She has never used smokeless tobacco. She reports that she does not drink alcohol and does not use drugs.    (Not in a hospital admission)     Allergies: Erythromycin     Review of Systems   Constitutional:  Positive for fatigue.   Respiratory: Negative.     Gastrointestinal: Negative.    Musculoskeletal:  Positive for gait problem.   Neurological:  Positive for dizziness and weakness.         Current Outpatient Medications:     Amifampridine Phosphate (Firdapse) 10 MG tablet, Take 2 tablets by mouth 4 (Four) Times a Day., Disp: , Rfl:     aspirin 81 MG EC tablet, Take 1 tablet by mouth Daily. 7-6-23 last dose, Disp: , Rfl:     atorvastatin (LIPITOR) 20 MG tablet, , Disp: , Rfl:     azaTHIOprine (IMURAN) 50 MG tablet, Take 1 tablet (50 mg) by mouth 1 (one) time each day for 14 days, THEN 2 tablets (100 mg) 1 (one) time each day for 14 days, THEN 3 tablets (150 mg) 1 (one) time each day thereafter., Disp: , Rfl:     furosemide (LASIX) 40 MG tablet, Take 1  tablet by mouth Daily., Disp: , Rfl:     ipratropium-albuterol (DUO-NEB) 0.5-2.5 mg/3 ml nebulizer, , Disp: , Rfl:     levothyroxine (SYNTHROID, LEVOTHROID) 75 MCG tablet, Take 1 tablet by mouth Daily., Disp: , Rfl:     metoprolol tartrate (LOPRESSOR) 50 MG tablet, Take 0.5 tablets by mouth Daily As Needed. Patient states she takes as needed., Disp: , Rfl:     multivitamin with minerals tablet tablet, Take 1 tablet by mouth Daily., Disp: 30 each, Rfl: 0    potassium chloride (K-DUR,KLOR-CON) 20 MEQ CR tablet, Take 1 tablet by mouth Daily., Disp: , Rfl:     spironolactone (ALDACTONE) 25 MG tablet, Take 1 tablet by mouth Daily., Disp: , Rfl:     PHYSICAL EXAMINATION:   There were no vitals taken for this visit.   There were no vitals filed for this visit.                             ECOG Performance Status: 2 - Symptomatic, <50% confined to bed  General Appearance:  alert, cooperative, no apparent distress and appears stated age   Neurologic/Psychiatric: A&O x 3, gait steady, appropriate affect, strength 5/5 in all muscle groups   HEENT:  Normocephalic, without obvious abnormality, mucous membranes moist   Neck: Supple, symmetrical, trachea midline, no adenopathy;  No thyromegaly, masses, or tenderness   Lungs:   Clear to auscultation bilaterally; respirations regular, even, and unlabored bilaterally   Heart:  Regular rate and rhythm, no murmurs appreciated   Abdomen:   Soft, non-tender, and non-distended   Lymph nodes: No cervical, supraclavicular, inguinal or axillary adenopathy noted   Extremities: Normal, atraumatic; no clubbing, cyanosis, or edema    Skin: No rashes, ulcers, or suspicious lesions noted     No visits with results within 2 Week(s) from this visit.   Latest known visit with results is:   Hospital Outpatient Visit on 12/18/2024   Component Date Value Ref Range Status    Glucose 12/18/2024 136 (H)  65 - 99 mg/dL Final    BUN 12/18/2024 7 (L)  8 - 23 mg/dL Final    Creatinine 12/18/2024 0.73  0.57 -  1.00 mg/dL Final    Sodium 12/18/2024 139  136 - 145 mmol/L Final    Potassium 12/18/2024 4.4  3.5 - 5.2 mmol/L Final    Chloride 12/18/2024 105  98 - 107 mmol/L Final    CO2 12/18/2024 25.3  22.0 - 29.0 mmol/L Final    Calcium 12/18/2024 10.2  8.6 - 10.5 mg/dL Final    Total Protein 12/18/2024 7.1  6.0 - 8.5 g/dL Final    Albumin 12/18/2024 4.3  3.5 - 5.2 g/dL Final    ALT (SGPT) 12/18/2024 19  1 - 33 U/L Final    AST (SGOT) 12/18/2024 25  1 - 32 U/L Final    Alkaline Phosphatase 12/18/2024 129 (H)  39 - 117 U/L Final    Total Bilirubin 12/18/2024 0.3  0.0 - 1.2 mg/dL Final    Globulin 12/18/2024 2.8  gm/dL Final    A/G Ratio 12/18/2024 1.5  g/dL Final    BUN/Creatinine Ratio 12/18/2024 9.6  7.0 - 25.0 Final    Anion Gap 12/18/2024 8.7  5.0 - 15.0 mmol/L Final    eGFR 12/18/2024 92.5  >60.0 mL/min/1.73 Final    TSH 12/18/2024 0.602  0.270 - 4.200 uIU/mL Final    Free T4 12/18/2024 1.51  0.92 - 1.68 ng/dL Final    WBC 12/18/2024 5.01  3.40 - 10.80 10*3/mm3 Final    RBC 12/18/2024 4.12  3.77 - 5.28 10*6/mm3 Final    Hemoglobin 12/18/2024 12.6  12.0 - 15.9 g/dL Final    Hematocrit 12/18/2024 37.3  34.0 - 46.6 % Final    MCV 12/18/2024 90.5  79.0 - 97.0 fL Final    MCH 12/18/2024 30.6  26.6 - 33.0 pg Final    MCHC 12/18/2024 33.8  31.5 - 35.7 g/dL Final    RDW 12/18/2024 13.2  12.3 - 15.4 % Final    RDW-SD 12/18/2024 43.4  37.0 - 54.0 fl Final    MPV 12/18/2024 9.0  6.0 - 12.0 fL Final    Platelets 12/18/2024 233  140 - 450 10*3/mm3 Final    Neutrophil % 12/18/2024 65.4  42.7 - 76.0 % Final    Lymphocyte % 12/18/2024 24.2  19.6 - 45.3 % Final    Monocyte % 12/18/2024 8.2  5.0 - 12.0 % Final    Eosinophil % 12/18/2024 1.4  0.3 - 6.2 % Final    Basophil % 12/18/2024 0.6  0.0 - 1.5 % Final    Immature Grans % 12/18/2024 0.2  0.0 - 0.5 % Final    Neutrophils, Absolute 12/18/2024 3.28  1.70 - 7.00 10*3/mm3 Final    Lymphocytes, Absolute 12/18/2024 1.21  0.70 - 3.10 10*3/mm3 Final    Monocytes, Absolute 12/18/2024 0.41  0.10  - 0.90 10*3/mm3 Final    Eosinophils, Absolute 12/18/2024 0.07  0.00 - 0.40 10*3/mm3 Final    Basophils, Absolute 12/18/2024 0.03  0.00 - 0.20 10*3/mm3 Final    Immature Grans, Absolute 12/18/2024 0.01  0.00 - 0.05 10*3/mm3 Final    nRBC 12/18/2024 0.0  0.0 - 0.2 /100 WBC Final        No results found.      ASSESSMENT: The patient is a very pleasant 64 y.o. female  with small cell lung cancer      PLAN:    1. Right lower lobe small cell lung cancer TxN2 M1b stage Mayelin:  A.  I will proceed with treatment as scheduled today maintenance immunotherapy Imfinzi cycle #9.  B.  She will follow-up with us in 4 weeks for cycle #10.  C. We reviewed potential side effects of immunotherapy including but not limited to immune mediated reactions with thyroiditis, pneumonitis, hepatitis, colitis, rash, and electrolyte abnormalities, fatigue, multiorgan failure, and possibly death.  D.  I will continue to monitor the patient blood work including blood counts kidney function liver function and electrolytes.  E.  I will do 4 months follow-up study which will be due mid March 2025.  F.  I discussed with the patient her blood results from December 18, 2024 CBC looked normal. CMP showed slightly elevated alk phos at 129 and glucose elevated at 136. will follow-up on labs from today.    2. Lambert-Eaton syndrome:  A. She will continue Firdapse 10 mg 4 times daily as prescribed by neurology.  B.  I do think this should be a contraindication for her immunotherapy since chemotherapy with treating the underlying cause for her neurologic syndrome.  C.  She was unable to tolerate plasma exchange.  She has declined any further exchanges  D.  She was started on Imuran.  She has been on 150 mg for the past week.  She has had increased dizziness since starting this.  I asked her to follow-up with her neurologist.    3. Hypothyroid:  A. She will continue Synthroid daily    4.  Poor systolic cardiac function:  A.  Status post defibrillator  placement.    5.  Dizziness:  A.  MRI on 2/12/2024 showed negative for metastatic disease.  B.  Worsening in the past 3 weeks since starting on increased dose of MRI on.  Scheduled for MRI next month.    6.  Chemotherapy-induced nausea:  A.  I will continue Zofran as needed.    FOLLOW UP: 4 weeks with cycle 10.    Addendum.  CMP showed AST and ALT elevated at 444 and 291.  Alk phosphatase is 560.  I discussed the case with Dr. Irving.  We will proceed with Imfinzi today.  Elevated liver enzymes is likely related to her MRI.  I asked the patient to stop this and call her neurologist today.    Above has been reviewed, updated, and unchanged as of 1/15/2025      Mary Carmen Hoover, APRN  1/15/2025

## 2025-02-12 ENCOUNTER — HOSPITAL ENCOUNTER (OUTPATIENT)
Facility: HOSPITAL | Age: 65
Discharge: HOME | End: 2025-02-12
Admitting: INTERNAL MEDICINE
Payer: MEDICARE

## 2025-02-12 ENCOUNTER — OFFICE VISIT (OUTPATIENT)
Dept: ONCOLOGY | Facility: CLINIC | Age: 65
End: 2025-02-12
Payer: MEDICARE

## 2025-02-12 VITALS
SYSTOLIC BLOOD PRESSURE: 119 MMHG | HEART RATE: 104 BPM | TEMPERATURE: 96.8 F | OXYGEN SATURATION: 94 % | WEIGHT: 148 LBS | HEIGHT: 66 IN | BODY MASS INDEX: 23.78 KG/M2 | DIASTOLIC BLOOD PRESSURE: 67 MMHG | RESPIRATION RATE: 16 BRPM

## 2025-02-12 DIAGNOSIS — C34.31 SMALL CELL LUNG CANCER, RIGHT LOWER LOBE: Primary | ICD-10-CM

## 2025-02-12 DIAGNOSIS — C77.2 SECONDARY MALIGNANT NEOPLASM OF INTRA-ABDOMINAL LYMPH NODES: ICD-10-CM

## 2025-02-12 DIAGNOSIS — G70.80: ICD-10-CM

## 2025-02-12 LAB
ALBUMIN SERPL-MCNC: 4.3 G/DL (ref 3.5–5.2)
ALBUMIN/GLOB SERPL: 1.7 G/DL
ALP SERPL-CCNC: 182 U/L (ref 39–117)
ALT SERPL W P-5'-P-CCNC: 16 U/L (ref 1–33)
ANION GAP SERPL CALCULATED.3IONS-SCNC: 10.5 MMOL/L (ref 5–15)
AST SERPL-CCNC: 26 U/L (ref 1–32)
BASOPHILS # BLD AUTO: 0.07 10*3/MM3 (ref 0–0.2)
BASOPHILS NFR BLD AUTO: 1.3 % (ref 0–1.5)
BILIRUB SERPL-MCNC: 0.3 MG/DL (ref 0–1.2)
BUN SERPL-MCNC: 8 MG/DL (ref 8–23)
BUN/CREAT SERPL: 12.3 (ref 7–25)
CALCIUM SPEC-SCNC: 9.9 MG/DL (ref 8.6–10.5)
CHLORIDE SERPL-SCNC: 106 MMOL/L (ref 98–107)
CO2 SERPL-SCNC: 25.5 MMOL/L (ref 22–29)
CREAT SERPL-MCNC: 0.65 MG/DL (ref 0.57–1)
DEPRECATED RDW RBC AUTO: 44 FL (ref 37–54)
EGFRCR SERPLBLD CKD-EPI 2021: 99.1 ML/MIN/1.73
EOSINOPHIL # BLD AUTO: 0.09 10*3/MM3 (ref 0–0.4)
EOSINOPHIL NFR BLD AUTO: 1.7 % (ref 0.3–6.2)
ERYTHROCYTE [DISTWIDTH] IN BLOOD BY AUTOMATED COUNT: 13.6 % (ref 12.3–15.4)
GLOBULIN UR ELPH-MCNC: 2.6 GM/DL
GLUCOSE SERPL-MCNC: 85 MG/DL (ref 65–99)
HCT VFR BLD AUTO: 39.9 % (ref 34–46.6)
HGB BLD-MCNC: 13.7 G/DL (ref 12–15.9)
IMM GRANULOCYTES # BLD AUTO: 0.02 10*3/MM3 (ref 0–0.05)
IMM GRANULOCYTES NFR BLD AUTO: 0.4 % (ref 0–0.5)
LYMPHOCYTES # BLD AUTO: 1.36 10*3/MM3 (ref 0.7–3.1)
LYMPHOCYTES NFR BLD AUTO: 26.2 % (ref 19.6–45.3)
MCH RBC QN AUTO: 30.3 PG (ref 26.6–33)
MCHC RBC AUTO-ENTMCNC: 34.3 G/DL (ref 31.5–35.7)
MCV RBC AUTO: 88.3 FL (ref 79–97)
MONOCYTES # BLD AUTO: 0.41 10*3/MM3 (ref 0.1–0.9)
MONOCYTES NFR BLD AUTO: 7.9 % (ref 5–12)
NEUTROPHILS NFR BLD AUTO: 3.24 10*3/MM3 (ref 1.7–7)
NEUTROPHILS NFR BLD AUTO: 62.5 % (ref 42.7–76)
NRBC BLD AUTO-RTO: 0 /100 WBC (ref 0–0.2)
PLATELET # BLD AUTO: 268 10*3/MM3 (ref 140–450)
PMV BLD AUTO: 9.4 FL (ref 6–12)
POTASSIUM SERPL-SCNC: 4.2 MMOL/L (ref 3.5–5.2)
PROT SERPL-MCNC: 6.9 G/DL (ref 6–8.5)
RBC # BLD AUTO: 4.52 10*6/MM3 (ref 3.77–5.28)
SODIUM SERPL-SCNC: 142 MMOL/L (ref 136–145)
T4 FREE SERPL-MCNC: 1.55 NG/DL (ref 0.92–1.68)
TSH SERPL DL<=0.05 MIU/L-ACNC: 1.02 UIU/ML (ref 0.27–4.2)
WBC NRBC COR # BLD AUTO: 5.19 10*3/MM3 (ref 3.4–10.8)

## 2025-02-12 PROCEDURE — 25810000003 SODIUM CHLORIDE 0.9 % SOLUTION 250 ML FLEX CONT: Performed by: NURSE PRACTITIONER

## 2025-02-12 PROCEDURE — 80053 COMPREHEN METABOLIC PANEL: CPT | Performed by: INTERNAL MEDICINE

## 2025-02-12 PROCEDURE — 85025 COMPLETE CBC W/AUTO DIFF WBC: CPT | Performed by: INTERNAL MEDICINE

## 2025-02-12 PROCEDURE — 25010000002 DURVALUMAB 50 MG/ML SOLUTION 10 ML VIAL: Performed by: NURSE PRACTITIONER

## 2025-02-12 PROCEDURE — 84439 ASSAY OF FREE THYROXINE: CPT | Performed by: INTERNAL MEDICINE

## 2025-02-12 PROCEDURE — 84443 ASSAY THYROID STIM HORMONE: CPT | Performed by: INTERNAL MEDICINE

## 2025-02-12 PROCEDURE — 96413 CHEMO IV INFUSION 1 HR: CPT

## 2025-02-12 PROCEDURE — 1126F AMNT PAIN NOTED NONE PRSNT: CPT | Performed by: INTERNAL MEDICINE

## 2025-02-12 PROCEDURE — 99214 OFFICE O/P EST MOD 30 MIN: CPT | Performed by: INTERNAL MEDICINE

## 2025-02-12 RX ORDER — SODIUM CHLORIDE 9 MG/ML
20 INJECTION, SOLUTION INTRAVENOUS ONCE
Status: DISCONTINUED | OUTPATIENT
Start: 2025-02-12 | End: 2025-02-13 | Stop reason: HOSPADM

## 2025-02-12 RX ORDER — SODIUM CHLORIDE 9 MG/ML
20 INJECTION, SOLUTION INTRAVENOUS ONCE
Status: CANCELLED | OUTPATIENT
Start: 2025-02-12

## 2025-02-12 RX ADMIN — SODIUM CHLORIDE 1500 MG: 9 INJECTION, SOLUTION INTRAVENOUS at 12:33

## 2025-02-12 NOTE — PROGRESS NOTES
DATE OF VISIT: 2/12/2025    REASON FOR VISIT: Followup for right lower lobe small cell lung cancer     PROBLEM LIST:  1. Right lower lobe small cell lung cancer T1 N1 M0 stage IIb:  A.  Presented with ataxia and lower extremities weakness  B.  Negative bronchoscopy August 19, 2020 and endobronchial ultrasound September 9, 2020  C.  CT chest done December 7, 2020 revealed 2.7 cm right hilar mass  D.  Whole-body PET scan done January 11, 2020 revealed isolated hypermetabolic activity at the right hilar mass SUV of 12  E. positive serology for AGN A-1 antibody which is 92% predicted for small cell lung cancer, positive N type calcium channel blocker and P/Q type calcium channel blooker.  F.  Started chest radiation February 26, 2021, completed end of March 2021  G.  Repeated PET scan done June 08, 2023 revealed complete resolution of activity in the chest however new portacaval hypermetabolic mass.  Biopsy-proven small cell cancer July 12, 2023  H.  Treated with definitive radiotherapy, completed July 2023.  I.  Progressive disease with worsening para-aortic lymphadenopathy document CT scans in February 1, 2024.  J.  Started chemotherapy with carboplatin etoposide and Imfinzi February 2024, Status post 4 cycles.  K.  Started maintenance Imfinzi May 8, 2024.  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  Hypertension  4.  Hypercholesteremia      HISTORY OF PRESENT ILLNESS: The patient is a very pleasant 64 y.o. female with past medical history significant for suspected small cell lung cancer of the right lung. The patient never had a positive biopsy however her serology was suggestive of SCLCA after presenting with Lambert-Eaton syndrome. The patient is here today for scheduled follow up visit with treatment maintenance therapy, Imfinzi cycle #10.      SUBJECTIVE: Gayatri is here today with her .  Denies any fever or chills but she does complain of dizziness and fatigue.  She  stopped taking Imuran.    Past History:  Medical History: has a past medical history of Arthritis, Disease of thyroid gland, Heart disease, History of radiation therapy (08/11/2023), Hyperlipidemia, Lambert-Eaton syndrome, Lung cancer (02/2021), Metastatic cancer, Pneumonia, and Requires supplemental oxygen.   Surgical History: has a past surgical history that includes Hysterectomy; Foot surgery; and Knee surgery.   Family History: family history includes Brain cancer in her father; Breast cancer in her sister; Heart disease in her brother, mother, and sister; Kidney disease in her mother.   Social History: reports that she quit smoking about 2 years ago. Her smoking use included cigarettes. She started smoking about 17 years ago. She has a 15 pack-year smoking history. She has never used smokeless tobacco. She reports that she does not drink alcohol and does not use drugs.    (Not in a hospital admission)     Allergies: Erythromycin     Review of Systems   Constitutional:  Positive for fatigue.   Respiratory: Negative.     Gastrointestinal: Negative.    Musculoskeletal:  Positive for gait problem.   Neurological:  Positive for dizziness and weakness.         Current Outpatient Medications:     Amifampridine Phosphate (Firdapse) 10 MG tablet, Take 2 tablets by mouth 4 (Four) Times a Day., Disp: , Rfl:     aspirin 81 MG EC tablet, Take 1 tablet by mouth Daily. 7-6-23 last dose, Disp: , Rfl:     atorvastatin (LIPITOR) 20 MG tablet, , Disp: , Rfl:     azaTHIOprine (IMURAN) 50 MG tablet, Take 1 tablet (50 mg) by mouth 1 (one) time each day for 14 days, THEN 2 tablets (100 mg) 1 (one) time each day for 14 days, THEN 3 tablets (150 mg) 1 (one) time each day thereafter., Disp: , Rfl:     furosemide (LASIX) 40 MG tablet, Take 1 tablet by mouth Daily., Disp: , Rfl:     ipratropium-albuterol (DUO-NEB) 0.5-2.5 mg/3 ml nebulizer, , Disp: , Rfl:     levothyroxine (SYNTHROID, LEVOTHROID) 75 MCG tablet, Take 1 tablet by mouth  Daily., Disp: , Rfl:     metoprolol tartrate (LOPRESSOR) 50 MG tablet, Take 0.5 tablets by mouth Daily As Needed. Patient states she takes as needed., Disp: , Rfl:     multivitamin with minerals tablet tablet, Take 1 tablet by mouth Daily., Disp: 30 each, Rfl: 0    potassium chloride (K-DUR,KLOR-CON) 20 MEQ CR tablet, Take 1 tablet by mouth Daily., Disp: , Rfl:     spironolactone (ALDACTONE) 25 MG tablet, Take 1 tablet by mouth Daily., Disp: , Rfl:     PHYSICAL EXAMINATION:   There were no vitals taken for this visit.   There were no vitals filed for this visit.                             ECOG Performance Status: 2 - Symptomatic, <50% confined to bed  General Appearance:  alert, cooperative, no apparent distress and appears stated age   Neurologic/Psychiatric: A&O x 3, gait steady, appropriate affect, strength 5/5 in all muscle groups   HEENT:  Normocephalic, without obvious abnormality, mucous membranes moist   Neck: Supple, symmetrical, trachea midline, no adenopathy;  No thyromegaly, masses, or tenderness   Lungs:   Clear to auscultation bilaterally; respirations regular, even, and unlabored bilaterally   Heart:  Regular rate and rhythm, no murmurs appreciated   Abdomen:   Soft, non-tender, and non-distended   Lymph nodes: No cervical, supraclavicular, inguinal or axillary adenopathy noted   Extremities: Normal, atraumatic; no clubbing, cyanosis, or edema    Skin: No rashes, ulcers, or suspicious lesions noted     No visits with results within 2 Week(s) from this visit.   Latest known visit with results is:   Hospital Outpatient Visit on 01/15/2025   Component Date Value Ref Range Status    Free T4 01/15/2025 1.53  0.92 - 1.68 ng/dL Final    TSH 01/15/2025 3.100  0.270 - 4.200 uIU/mL Final    Glucose 01/15/2025 110 (H)  65 - 99 mg/dL Final    BUN 01/15/2025 7 (L)  8 - 23 mg/dL Final    Creatinine 01/15/2025 0.68  0.57 - 1.00 mg/dL Final    Sodium 01/15/2025 138  136 - 145 mmol/L Final    Potassium 01/15/2025 4.1   3.5 - 5.2 mmol/L Final    Chloride 01/15/2025 102  98 - 107 mmol/L Final    CO2 01/15/2025 27.1  22.0 - 29.0 mmol/L Final    Calcium 01/15/2025 10.1  8.6 - 10.5 mg/dL Final    Total Protein 01/15/2025 7.5  6.0 - 8.5 g/dL Final    Albumin 01/15/2025 4.5  3.5 - 5.2 g/dL Final    ALT (SGPT) 01/15/2025 291 (H)  1 - 33 U/L Final    AST (SGOT) 01/15/2025 444 (H)  1 - 32 U/L Final    Alkaline Phosphatase 01/15/2025 560 (H)  39 - 117 U/L Final    Total Bilirubin 01/15/2025 0.7  0.0 - 1.2 mg/dL Final    Globulin 01/15/2025 3.0  gm/dL Final    A/G Ratio 01/15/2025 1.5  g/dL Final    BUN/Creatinine Ratio 01/15/2025 10.3  7.0 - 25.0 Final    Anion Gap 01/15/2025 8.9  5.0 - 15.0 mmol/L Final    eGFR 01/15/2025 98.0  >60.0 mL/min/1.73 Final    WBC 01/15/2025 4.47  3.40 - 10.80 10*3/mm3 Final    RBC 01/15/2025 4.47  3.77 - 5.28 10*6/mm3 Final    Hemoglobin 01/15/2025 13.7  12.0 - 15.9 g/dL Final    Hematocrit 01/15/2025 39.9  34.0 - 46.6 % Final    MCV 01/15/2025 89.3  79.0 - 97.0 fL Final    MCH 01/15/2025 30.6  26.6 - 33.0 pg Final    MCHC 01/15/2025 34.3  31.5 - 35.7 g/dL Final    RDW 01/15/2025 13.3  12.3 - 15.4 % Final    RDW-SD 01/15/2025 43.8  37.0 - 54.0 fl Final    MPV 01/15/2025 9.3  6.0 - 12.0 fL Final    Platelets 01/15/2025 282  140 - 450 10*3/mm3 Final    Neutrophil % 01/15/2025 69.1  42.7 - 76.0 % Final    Lymphocyte % 01/15/2025 20.1  19.6 - 45.3 % Final    Monocyte % 01/15/2025 7.8  5.0 - 12.0 % Final    Eosinophil % 01/15/2025 1.6  0.3 - 6.2 % Final    Basophil % 01/15/2025 0.7  0.0 - 1.5 % Final    Immature Grans % 01/15/2025 0.7 (H)  0.0 - 0.5 % Final    Neutrophils, Absolute 01/15/2025 3.09  1.70 - 7.00 10*3/mm3 Final    Lymphocytes, Absolute 01/15/2025 0.90  0.70 - 3.10 10*3/mm3 Final    Monocytes, Absolute 01/15/2025 0.35  0.10 - 0.90 10*3/mm3 Final    Eosinophils, Absolute 01/15/2025 0.07  0.00 - 0.40 10*3/mm3 Final    Basophils, Absolute 01/15/2025 0.03  0.00 - 0.20 10*3/mm3 Final    Immature Grans,  Absolute 01/15/2025 0.03  0.00 - 0.05 10*3/mm3 Final    nRBC 01/15/2025 0.0  0.0 - 0.2 /100 WBC Final        No results found.      ASSESSMENT: The patient is a very pleasant 64 y.o. female  with small cell lung cancer      PLAN:    1. Right lower lobe small cell lung cancer TxN2 M1b stage Mayelin:  A.  I will proceed with treatment as scheduled today maintenance immunotherapy Imfinzi cycle #10.  B.  She will follow-up with us in 4 weeks for cycle #11.  C. We reviewed potential side effects of immunotherapy including but not limited to immune mediated reactions with thyroiditis, pneumonitis, hepatitis, colitis, rash, and electrolyte abnormalities, fatigue, multiorgan failure, and possibly death.  D.  I will continue to monitor the patient blood work including blood counts kidney function liver function and electrolytes.  E.  I will do 4 months follow-up study which will be due mid March 2025.  Will order this next visit.  F.  I did go over the blood results with the patient from January 15, 2025.  Liver enzymes went up with   and alk phos 560.  I will follow-up on the results from today if this continues to increase we may have to hold immunotherapy and give her steroids.  I am suspecting it is induced by Imuran.    2. Lambert-Eaton syndrome:  A. She will continue Firdapse 10 mg 4 times daily as prescribed by neurology.  B.  I do think this should be a contraindication for her immunotherapy since chemotherapy with treating the underlying cause for her neurologic syndrome.  C.  She was unable to tolerate plasma exchange.  She has declined any further exchanges  D.  I recommend to keep Imuran on hold given her worsening liver enzymes.    3. Hypothyroid:  A. She will continue Synthroid daily    4.  Poor systolic cardiac function:  A.  Status post defibrillator placement.    5.  Dizziness:  A.  MRI on 2/12/2024 showed negative for metastatic disease.  B.  Worsening in the past 3 weeks since starting on  increased dose of MRI on.  Scheduled for MRI next month.    6.  Chemotherapy-induced nausea:  A.  I will continue Zofran as needed.    FOLLOW UP: 4 weeks with cycle 11.    Susi Irving MD  2/12/2025   - - -

## 2025-02-24 ENCOUNTER — TELEPHONE (OUTPATIENT)
Dept: ONCOLOGY | Facility: CLINIC | Age: 65
End: 2025-02-24
Payer: MEDICARE

## 2025-02-24 NOTE — TELEPHONE ENCOUNTER
RN called pt with message that was unread from [employer].  RN relayed the following from the unread [employer] message:     Good morning Gayatri,   I just wanted to let you know that we tried to refer you over to [facility], but they said that you need to be seen by  neuromuscular. I understand that you already go there, but [facility] does not treat your condition. I talked with Dr. Irving and he said that it would be best to stay with . Please let me know if you have any questions! I am sorry for the inconvenience. Olivia DAVE     Pt verbalized understanding.

## 2025-03-12 ENCOUNTER — OFFICE VISIT (OUTPATIENT)
Dept: ONCOLOGY | Facility: CLINIC | Age: 65
End: 2025-03-12
Payer: MEDICARE

## 2025-03-12 ENCOUNTER — HOSPITAL ENCOUNTER (OUTPATIENT)
Facility: HOSPITAL | Age: 65
Discharge: HOME | End: 2025-03-12
Admitting: NURSE PRACTITIONER
Payer: MEDICARE

## 2025-03-12 VITALS
RESPIRATION RATE: 16 BRPM | HEIGHT: 66 IN | BODY MASS INDEX: 23.63 KG/M2 | SYSTOLIC BLOOD PRESSURE: 106 MMHG | WEIGHT: 147 LBS | OXYGEN SATURATION: 97 % | HEART RATE: 84 BPM | TEMPERATURE: 97.3 F | DIASTOLIC BLOOD PRESSURE: 55 MMHG

## 2025-03-12 DIAGNOSIS — C77.2 SECONDARY MALIGNANT NEOPLASM OF INTRA-ABDOMINAL LYMPH NODES: ICD-10-CM

## 2025-03-12 DIAGNOSIS — C77.2 SECONDARY MALIGNANT NEOPLASM OF INTRA-ABDOMINAL LYMPH NODES: Primary | ICD-10-CM

## 2025-03-12 DIAGNOSIS — C34.31 SMALL CELL LUNG CANCER, RIGHT LOWER LOBE: ICD-10-CM

## 2025-03-12 DIAGNOSIS — C34.31 SMALL CELL LUNG CANCER, RIGHT LOWER LOBE: Primary | ICD-10-CM

## 2025-03-12 LAB
ALBUMIN SERPL-MCNC: 4.3 G/DL (ref 3.5–5.2)
ALBUMIN/GLOB SERPL: 1.5 G/DL
ALP SERPL-CCNC: 128 U/L (ref 39–117)
ALT SERPL W P-5'-P-CCNC: 20 U/L (ref 1–33)
ANION GAP SERPL CALCULATED.3IONS-SCNC: 10 MMOL/L (ref 5–15)
AST SERPL-CCNC: 30 U/L (ref 1–32)
BASOPHILS # BLD AUTO: 0.04 10*3/MM3 (ref 0–0.2)
BASOPHILS NFR BLD AUTO: 0.7 % (ref 0–1.5)
BILIRUB SERPL-MCNC: 0.4 MG/DL (ref 0–1.2)
BUN SERPL-MCNC: 7 MG/DL (ref 8–23)
BUN/CREAT SERPL: 9.9 (ref 7–25)
CALCIUM SPEC-SCNC: 10.1 MG/DL (ref 8.6–10.5)
CHLORIDE SERPL-SCNC: 104 MMOL/L (ref 98–107)
CO2 SERPL-SCNC: 25 MMOL/L (ref 22–29)
CREAT SERPL-MCNC: 0.71 MG/DL (ref 0.57–1)
DEPRECATED RDW RBC AUTO: 45.2 FL (ref 37–54)
EGFRCR SERPLBLD CKD-EPI 2021: 95.7 ML/MIN/1.73
EOSINOPHIL # BLD AUTO: 0.11 10*3/MM3 (ref 0–0.4)
EOSINOPHIL NFR BLD AUTO: 2.1 % (ref 0.3–6.2)
ERYTHROCYTE [DISTWIDTH] IN BLOOD BY AUTOMATED COUNT: 13.9 % (ref 12.3–15.4)
GLOBULIN UR ELPH-MCNC: 2.8 GM/DL
GLUCOSE SERPL-MCNC: 110 MG/DL (ref 65–99)
HCT VFR BLD AUTO: 39.9 % (ref 34–46.6)
HGB BLD-MCNC: 13.5 G/DL (ref 12–15.9)
IMM GRANULOCYTES # BLD AUTO: 0.02 10*3/MM3 (ref 0–0.05)
IMM GRANULOCYTES NFR BLD AUTO: 0.4 % (ref 0–0.5)
LYMPHOCYTES # BLD AUTO: 1.4 10*3/MM3 (ref 0.7–3.1)
LYMPHOCYTES NFR BLD AUTO: 26.2 % (ref 19.6–45.3)
MCH RBC QN AUTO: 30.3 PG (ref 26.6–33)
MCHC RBC AUTO-ENTMCNC: 33.8 G/DL (ref 31.5–35.7)
MCV RBC AUTO: 89.5 FL (ref 79–97)
MONOCYTES # BLD AUTO: 0.46 10*3/MM3 (ref 0.1–0.9)
MONOCYTES NFR BLD AUTO: 8.6 % (ref 5–12)
NEUTROPHILS NFR BLD AUTO: 3.32 10*3/MM3 (ref 1.7–7)
NEUTROPHILS NFR BLD AUTO: 62 % (ref 42.7–76)
NRBC BLD AUTO-RTO: 0 /100 WBC (ref 0–0.2)
PLATELET # BLD AUTO: 236 10*3/MM3 (ref 140–450)
PMV BLD AUTO: 9.5 FL (ref 6–12)
POTASSIUM SERPL-SCNC: 5 MMOL/L (ref 3.5–5.2)
PROT SERPL-MCNC: 7.1 G/DL (ref 6–8.5)
RBC # BLD AUTO: 4.46 10*6/MM3 (ref 3.77–5.28)
SODIUM SERPL-SCNC: 139 MMOL/L (ref 136–145)
T4 FREE SERPL-MCNC: 1.55 NG/DL (ref 0.92–1.68)
TSH SERPL DL<=0.05 MIU/L-ACNC: 0.94 UIU/ML (ref 0.27–4.2)
WBC NRBC COR # BLD AUTO: 5.35 10*3/MM3 (ref 3.4–10.8)

## 2025-03-12 PROCEDURE — 80053 COMPREHEN METABOLIC PANEL: CPT | Performed by: NURSE PRACTITIONER

## 2025-03-12 PROCEDURE — 96413 CHEMO IV INFUSION 1 HR: CPT

## 2025-03-12 PROCEDURE — 84439 ASSAY OF FREE THYROXINE: CPT | Performed by: NURSE PRACTITIONER

## 2025-03-12 PROCEDURE — 84443 ASSAY THYROID STIM HORMONE: CPT | Performed by: NURSE PRACTITIONER

## 2025-03-12 PROCEDURE — 85025 COMPLETE CBC W/AUTO DIFF WBC: CPT | Performed by: NURSE PRACTITIONER

## 2025-03-12 PROCEDURE — 25810000003 SODIUM CHLORIDE 0.9 % SOLUTION 250 ML FLEX CONT: Performed by: NURSE PRACTITIONER

## 2025-03-12 PROCEDURE — 25010000002 DURVALUMAB 50 MG/ML SOLUTION 10 ML VIAL: Performed by: NURSE PRACTITIONER

## 2025-03-12 RX ORDER — SODIUM CHLORIDE 9 MG/ML
20 INJECTION, SOLUTION INTRAVENOUS ONCE
Status: DISCONTINUED | OUTPATIENT
Start: 2025-03-12 | End: 2025-03-13 | Stop reason: HOSPADM

## 2025-03-12 RX ORDER — SODIUM CHLORIDE 9 MG/ML
20 INJECTION, SOLUTION INTRAVENOUS ONCE
Status: CANCELLED | OUTPATIENT
Start: 2025-03-12

## 2025-03-12 RX ADMIN — DURVALUMAB 1500 MG: 500 INJECTION, SOLUTION INTRAVENOUS at 12:40

## 2025-03-12 NOTE — PROGRESS NOTES
DATE OF VISIT: 3/12/2025    REASON FOR VISIT: Followup for right lower lobe small cell lung cancer     PROBLEM LIST:  1. Right lower lobe small cell lung cancer T1 N1 M0 stage IIb:  A.  Presented with ataxia and lower extremities weakness  B.  Negative bronchoscopy August 19, 2020 and endobronchial ultrasound September 9, 2020  C.  CT chest done December 7, 2020 revealed 2.7 cm right hilar mass  D.  Whole-body PET scan done January 11, 2020 revealed isolated hypermetabolic activity at the right hilar mass SUV of 12  E. positive serology for AGN A-1 antibody which is 92% predicted for small cell lung cancer, positive N type calcium channel blocker and P/Q type calcium channel blooker.  F.  Started chest radiation February 26, 2021, completed end of March 2021  G.  Repeated PET scan done June 08, 2023 revealed complete resolution of activity in the chest however new portacaval hypermetabolic mass.  Biopsy-proven small cell cancer July 12, 2023  H.  Treated with definitive radiotherapy, completed July 2023.  I.  Progressive disease with worsening para-aortic lymphadenopathy document CT scans in February 1, 2024.  J.  Started chemotherapy with carboplatin etoposide and Imfinzi February 2024, Status post 4 cycles.  K.  Started maintenance Imfinzi May 8, 2024.  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  Hypertension  4.  Hypercholesteremia      HISTORY OF PRESENT ILLNESS: The patient is a very pleasant 64 y.o. female with past medical history significant for suspected small cell lung cancer of the right lung. The patient never had a positive biopsy however her serology was suggestive of SCLCA after presenting with Lambert-Eaton syndrome. The patient is here today for scheduled follow up visit with treatment maintenance therapy, Imfinzi cycle #11.      SUBJECTIVE: Gayatri is here today with her .  Denies any fever, chills, night sweats.  Continues to complain of dizziness and  fatigue is about the same.  She stopped taking Imuran.  Liver functions got better.  She is feeling more dizzy.  She has been unable to see the neurologist slightly.  She is called multiple times.    Past History:  Medical History: has a past medical history of Arthritis, Disease of thyroid gland, Heart disease, History of radiation therapy (08/11/2023), Hyperlipidemia, Lambert-Eaton syndrome, Lung cancer (02/2021), Metastatic cancer, Pneumonia, and Requires supplemental oxygen.   Surgical History: has a past surgical history that includes Hysterectomy; Foot surgery; and Knee surgery.   Family History: family history includes Brain cancer in her father; Breast cancer in her sister; Heart disease in her brother, mother, and sister; Kidney disease in her mother.   Social History: reports that she quit smoking about 2 years ago. Her smoking use included cigarettes. She started smoking about 17 years ago. She has a 15 pack-year smoking history. She has never used smokeless tobacco. She reports that she does not drink alcohol and does not use drugs.    (Not in a hospital admission)     Allergies: Erythromycin     Review of Systems   Constitutional:  Positive for fatigue.   Respiratory: Negative.     Gastrointestinal: Negative.    Musculoskeletal:  Positive for gait problem.   Neurological:  Positive for dizziness and weakness.         Current Outpatient Medications:     Amifampridine Phosphate (Firdapse) 10 MG tablet, Take 2 tablets by mouth 4 (Four) Times a Day., Disp: , Rfl:     aspirin 81 MG EC tablet, Take 1 tablet by mouth Daily. 7-6-23 last dose, Disp: , Rfl:     atorvastatin (LIPITOR) 20 MG tablet, , Disp: , Rfl:     azaTHIOprine (IMURAN) 50 MG tablet, Take 1 tablet (50 mg) by mouth 1 (one) time each day for 14 days, THEN 2 tablets (100 mg) 1 (one) time each day for 14 days, THEN 3 tablets (150 mg) 1 (one) time each day thereafter., Disp: , Rfl:     furosemide (LASIX) 40 MG tablet, Take 1 tablet by mouth Daily.,  Disp: , Rfl:     ipratropium-albuterol (DUO-NEB) 0.5-2.5 mg/3 ml nebulizer, , Disp: , Rfl:     levothyroxine (SYNTHROID, LEVOTHROID) 75 MCG tablet, Take 1 tablet by mouth Daily., Disp: , Rfl:     metoprolol tartrate (LOPRESSOR) 50 MG tablet, Take 0.5 tablets by mouth Daily As Needed. Patient states she takes as needed., Disp: , Rfl:     multivitamin with minerals tablet tablet, Take 1 tablet by mouth Daily., Disp: 30 each, Rfl: 0    potassium chloride (K-DUR,KLOR-CON) 20 MEQ CR tablet, Take 1 tablet by mouth Daily., Disp: , Rfl:     spironolactone (ALDACTONE) 25 MG tablet, Take 1 tablet by mouth Daily., Disp: , Rfl:     PHYSICAL EXAMINATION:   There were no vitals taken for this visit.   There were no vitals filed for this visit.                             ECOG Performance Status: 2 - Symptomatic, <50% confined to bed  General Appearance:  alert, cooperative, no apparent distress and appears stated age   Neurologic/Psychiatric: A&O x 3, gait steady, appropriate affect, strength 5/5 in all muscle groups   HEENT:  Normocephalic, without obvious abnormality, mucous membranes moist   Neck: Supple, symmetrical, trachea midline, no adenopathy;  No thyromegaly, masses, or tenderness   Lungs:   Clear to auscultation bilaterally; respirations regular, even, and unlabored bilaterally   Heart:  Regular rate and rhythm, no murmurs appreciated   Abdomen:   Soft, non-tender, and non-distended   Lymph nodes: No cervical, supraclavicular, inguinal or axillary adenopathy noted   Extremities: Normal, atraumatic; no clubbing, cyanosis, or edema    Skin: No rashes, ulcers, or suspicious lesions noted     No visits with results within 2 Week(s) from this visit.   Latest known visit with results is:   Hospital Outpatient Visit on 02/12/2025   Component Date Value Ref Range Status    Glucose 02/12/2025 85  65 - 99 mg/dL Final    BUN 02/12/2025 8  8 - 23 mg/dL Final    Creatinine 02/12/2025 0.65  0.57 - 1.00 mg/dL Final    Sodium  02/12/2025 142  136 - 145 mmol/L Final    Potassium 02/12/2025 4.2  3.5 - 5.2 mmol/L Final    Slight hemolysis detected by analyzer. Result may be falsely elevated.    Chloride 02/12/2025 106  98 - 107 mmol/L Final    CO2 02/12/2025 25.5  22.0 - 29.0 mmol/L Final    Calcium 02/12/2025 9.9  8.6 - 10.5 mg/dL Final    Total Protein 02/12/2025 6.9  6.0 - 8.5 g/dL Final    Albumin 02/12/2025 4.3  3.5 - 5.2 g/dL Final    ALT (SGPT) 02/12/2025 16  1 - 33 U/L Final    AST (SGOT) 02/12/2025 26  1 - 32 U/L Final    Slight hemolysis detected by analyzer. Result may be falsely elevated.    Alkaline Phosphatase 02/12/2025 182 (H)  39 - 117 U/L Final    Total Bilirubin 02/12/2025 0.3  0.0 - 1.2 mg/dL Final    Globulin 02/12/2025 2.6  gm/dL Final    A/G Ratio 02/12/2025 1.7  g/dL Final    BUN/Creatinine Ratio 02/12/2025 12.3  7.0 - 25.0 Final    Anion Gap 02/12/2025 10.5  5.0 - 15.0 mmol/L Final    eGFR 02/12/2025 99.1  >60.0 mL/min/1.73 Final    TSH 02/12/2025 1.020  0.270 - 4.200 uIU/mL Final    Free T4 02/12/2025 1.55  0.92 - 1.68 ng/dL Final    WBC 02/12/2025 5.19  3.40 - 10.80 10*3/mm3 Final    RBC 02/12/2025 4.52  3.77 - 5.28 10*6/mm3 Final    Hemoglobin 02/12/2025 13.7  12.0 - 15.9 g/dL Final    Hematocrit 02/12/2025 39.9  34.0 - 46.6 % Final    MCV 02/12/2025 88.3  79.0 - 97.0 fL Final    MCH 02/12/2025 30.3  26.6 - 33.0 pg Final    MCHC 02/12/2025 34.3  31.5 - 35.7 g/dL Final    RDW 02/12/2025 13.6  12.3 - 15.4 % Final    RDW-SD 02/12/2025 44.0  37.0 - 54.0 fl Final    MPV 02/12/2025 9.4  6.0 - 12.0 fL Final    Platelets 02/12/2025 268  140 - 450 10*3/mm3 Final    Neutrophil % 02/12/2025 62.5  42.7 - 76.0 % Final    Lymphocyte % 02/12/2025 26.2  19.6 - 45.3 % Final    Monocyte % 02/12/2025 7.9  5.0 - 12.0 % Final    Eosinophil % 02/12/2025 1.7  0.3 - 6.2 % Final    Basophil % 02/12/2025 1.3  0.0 - 1.5 % Final    Immature Grans % 02/12/2025 0.4  0.0 - 0.5 % Final    Neutrophils, Absolute 02/12/2025 3.24  1.70 - 7.00  10*3/mm3 Final    Lymphocytes, Absolute 02/12/2025 1.36  0.70 - 3.10 10*3/mm3 Final    Monocytes, Absolute 02/12/2025 0.41  0.10 - 0.90 10*3/mm3 Final    Eosinophils, Absolute 02/12/2025 0.09  0.00 - 0.40 10*3/mm3 Final    Basophils, Absolute 02/12/2025 0.07  0.00 - 0.20 10*3/mm3 Final    Immature Grans, Absolute 02/12/2025 0.02  0.00 - 0.05 10*3/mm3 Final    nRBC 02/12/2025 0.0  0.0 - 0.2 /100 WBC Final        No results found.      ASSESSMENT: The patient is a very pleasant 64 y.o. female  with small cell lung cancer      PLAN:    1. Right lower lobe small cell lung cancer TxN2 M1b stage [place]:  A.  I will proceed with treatment as scheduled today maintenance immunotherapy Imfinzi cycle #11.  B.  She will follow-up with us in 4 weeks for cycle #12.  C. We reviewed potential side effects of immunotherapy including but not limited to immune mediated reactions with thyroiditis, pneumonitis, hepatitis, colitis, rash, and electrolyte abnormalities, fatigue, multiorgan failure, and possibly death.  D.  I will continue to monitor the patient blood work including blood counts kidney function liver function and electrolytes.  E.  I will do 4 months follow-up study which will be due mid March 2025.  I will order scans prior to return.    F.  I did go over her blood results with the patient from February 12, 2025.  Liver enzymes were improved with AST at 26 and ALT at 16.  We will continue to monitor.  This was likely induced by Imuran as she stopped in January 2025 and liver enzymes were back to normal in February 2025.  G.  We will check MRI in the next month to rule out metastatic disease due to worsening dizziness and weakness.  We will try to get MRI images pushed from [facility].    2. Lambert-Eaton syndrome:  A. She will continue Firdapse 10 mg 4 times daily as prescribed by neurology.  B.  I do think this should be a contraindication for her immunotherapy since chemotherapy with treating the underlying cause for her neurologic  syndrome.  C.  She was unable to tolerate plasma exchange.  She has declined any further exchanges  D.  I recommend to keep Imuran on hold given her worsening liver enzymes.    3. Hypothyroid:  A. She will continue Synthroid daily    4.  Poor systolic cardiac function:  A.  Status post defibrillator placement.    5.  Dizziness:  A.  MRI on 2/12/2024 showed negative for metastatic disease.  B.  Worsening in the past 3 weeks since starting on increased dose of MRI on.  Scheduled for MRI next month.    6.  Chemotherapy-induced nausea:  A.  I will continue Zofran as needed.    FOLLOW UP: 4 weeks with cycle 12.    Total time of patient care including time prior to, face to face with patient, and following visit spent in reviewing records, lab results, imaging studies, discussion with patient, and documentation/charting was > 40 minutes      Mary Carmen Hoover, APRN  3/12/2025

## 2025-03-13 ENCOUNTER — TELEPHONE (OUTPATIENT)
Dept: NUTRITION | Facility: HOSPITAL | Age: 65
End: 2025-03-13
Payer: MEDICARE

## 2025-03-13 NOTE — PROGRESS NOTES
Outpatient Oncology Nutrition Assessment      Patient Name:  Gayarti Kahn  YOB: 1960  MRN: [id number]      Assessment Date:  3/13/2025    Comments:  Called pt for nutrition f/up regarding oncology treatment. Pt reports eating well and no weight loss. She does not have any nutrition questions at this time. RD to f/up.    Electronically signed by:  Veronika Yusuf RD  03/13/25 14:17 EDT

## 2025-03-26 NOTE — PROGRESS NOTES
DATE OF VISIT: 4/2/2025    REASON FOR VISIT: Followup for right lower lobe small cell lung cancer     PROBLEM LIST:  1. Right lower lobe small cell lung cancer T1 N1 M0 stage IIb:  A.  Presented with ataxia and lower extremities weakness  B.  Negative bronchoscopy August 19, 2020 and endobronchial ultrasound September 9, 2020  C.  CT chest done December 7, 2020 revealed 2.7 cm right hilar mass  D.  Whole-body PET scan done January 11, 2020 revealed isolated hypermetabolic activity at the right hilar mass SUV of 12  E. positive serology for AGN A-1 antibody which is 92% predicted for small cell lung cancer, positive N type calcium channel blocker and P/Q type calcium channel blooker.  F.  Started chest radiation February 26, 2021, completed end of March 2021  G.  Repeated PET scan done June 08, 2023 revealed complete resolution of activity in the chest however new portacaval hypermetabolic mass.  Biopsy-proven small cell cancer July 12, 2023  H.  Treated with definitive radiotherapy, completed July 2023.  I.  Progressive disease with worsening para-aortic lymphadenopathy document CT scans in February 1, 2024.  J.  Started chemotherapy with carboplatin etoposide and Imfinzi February 2024, Status post 4 cycles.  K.  Started maintenance Imfinzi May 8, 2024.  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  Hypertension  4.  Hypercholesteremia      HISTORY OF PRESENT ILLNESS: The patient is a very pleasant 64 y.o. female with past medical history significant for suspected small cell lung cancer of the right lung. The patient never had a positive biopsy however her serology was suggestive of SCLCA after presenting with Lambert-Eaton syndrome. The patient is here today for scheduled follow up visit with treatment maintenance therapy, Imfinzi cycle #12.      SUBJECTIVE: Gayatri is here today with her  but she is complaining of pain in her left hip.  She made to the ER twice.  Imaging  studies were nonrevealing.  She is going to see Ortho next week.    Past History:  Medical History: has a past medical history of Arthritis, Disease of thyroid gland, Heart disease, History of radiation therapy (08/11/2023), Hyperlipidemia, Lambert-Eaton syndrome, Lung cancer (02/2021), Metastatic cancer, Pneumonia, and Requires supplemental oxygen.   Surgical History: has a past surgical history that includes Hysterectomy; Foot surgery; and Knee surgery.   Family History: family history includes Brain cancer in her father; Breast cancer in her sister; Heart disease in her brother, mother, and sister; Kidney disease in her mother.   Social History: reports that she quit smoking about 2 years ago. Her smoking use included cigarettes. She started smoking about 17 years ago. She has a 15 pack-year smoking history. She has never used smokeless tobacco. She reports that she does not drink alcohol and does not use drugs.    (Not in a hospital admission)     Allergies: Erythromycin     Review of Systems   Constitutional:  Positive for fatigue.   Respiratory: Negative.     Gastrointestinal: Negative.    Musculoskeletal:  Positive for gait problem.   Neurological:  Positive for weakness.         Current Outpatient Medications:     Amifampridine Phosphate (Firdapse) 10 MG tablet, Take 2 tablets by mouth 4 (Four) Times a Day., Disp: , Rfl:     aspirin 81 MG EC tablet, Take 1 tablet by mouth Daily. 7-6-23 last dose, Disp: , Rfl:     atorvastatin (LIPITOR) 20 MG tablet, , Disp: , Rfl:     azaTHIOprine (IMURAN) 50 MG tablet, Take 1 tablet (50 mg) by mouth 1 (one) time each day for 14 days, THEN 2 tablets (100 mg) 1 (one) time each day for 14 days, THEN 3 tablets (150 mg) 1 (one) time each day thereafter., Disp: , Rfl:     furosemide (LASIX) 40 MG tablet, Take 1 tablet by mouth Daily., Disp: , Rfl:     ipratropium-albuterol (DUO-NEB) 0.5-2.5 mg/3 ml nebulizer, , Disp: , Rfl:     levothyroxine (SYNTHROID, LEVOTHROID) 75 MCG tablet,  Take 1 tablet by mouth Daily., Disp: , Rfl:     metoprolol tartrate (LOPRESSOR) 50 MG tablet, Take 0.5 tablets by mouth Daily As Needed. Patient states she takes as needed., Disp: , Rfl:     multivitamin with minerals tablet tablet, Take 1 tablet by mouth Daily., Disp: 30 each, Rfl: 0    naloxone (NARCAN) 4 MG/0.1ML nasal spray, Call 911. Don't prime. [place] in 1 nostril for overdose. Repeat in 2-3 minutes in other nostril if no or minimal breathing/responsiveness., Disp: 2 each, Rfl: 0    oxyCODONE-acetaminophen (PERCOCET) 5-325 MG per tablet, Take 1 tablet by mouth Every 6 (Six) Hours As Needed for Moderate Pain or Severe Pain., Disp: 12 tablet, Rfl: 0    potassium chloride (K-DUR,KLOR-CON) 20 MEQ CR tablet, Take 1 tablet by mouth Daily., Disp: , Rfl:     spironolactone (ALDACTONE) 25 MG tablet, Take 1 tablet by mouth Daily., Disp: , Rfl:     PHYSICAL EXAMINATION:   There were no vitals taken for this visit.   There were no vitals filed for this visit.               ECOG Performance Status: 2 - Symptomatic, <50% confined to bed  General Appearance:  alert, cooperative, no apparent distress and appears stated age   Neurologic/Psychiatric: A&O x 3, gait steady, appropriate affect, strength 5/5 in all muscle groups   HEENT:  Normocephalic, without obvious abnormality, mucous membranes moist   Neck: Supple, symmetrical, trachea midline, no adenopathy;  No thyromegaly, masses, or tenderness   Lungs:   Clear to auscultation bilaterally; respirations regular, even, and unlabored bilaterally   Heart:  Regular rate and rhythm, no murmurs appreciated   Abdomen:   Soft, non-tender, and non-distended   Lymph nodes: No cervical, supraclavicular, inguinal or axillary adenopathy noted   Extremities: Normal, atraumatic; no clubbing, cyanosis, or edema    Skin: No rashes, ulcers, or suspicious lesions noted     Admission on 03/28/2025, Discharged on 03/28/2025   Component Date Value Ref Range Status    Glucose 03/28/2025 128 (H)   65 - 99 mg/dL Final    BUN 03/28/2025 8  8 - 23 mg/dL Final    Creatinine 03/28/2025 0.71  0.57 - 1.00 mg/dL Final    Sodium 03/28/2025 137  136 - 145 mmol/L Final    Potassium 03/28/2025 4.8  3.5 - 5.2 mmol/L Final    Slight hemolysis detected by analyzer. Result may be falsely elevated.    Chloride 03/28/2025 101  98 - 107 mmol/L Final    CO2 03/28/2025 25.4  22.0 - 29.0 mmol/L Final    Calcium 03/28/2025 10.2  8.6 - 10.5 mg/dL Final    Total Protein 03/28/2025 7.2  6.0 - 8.5 g/dL Final    Albumin 03/28/2025 4.5  3.5 - 5.2 g/dL Final    ALT (SGPT) 03/28/2025 16  1 - 33 U/L Final    AST (SGOT) 03/28/2025 19  1 - 32 U/L Final    Alkaline Phosphatase 03/28/2025 131 (H)  39 - 117 U/L Final    Total Bilirubin 03/28/2025 0.3  0.0 - 1.2 mg/dL Final    Globulin 03/28/2025 2.7  gm/dL Final    A/G Ratio 03/28/2025 1.7  g/dL Final    BUN/Creatinine Ratio 03/28/2025 11.3  7.0 - 25.0 Final    Anion Gap 03/28/2025 10.6  5.0 - 15.0 mmol/L Final    eGFR 03/28/2025 95.7  >60.0 mL/min/1.73 Final    Lipase 03/28/2025 17  13 - 60 U/L Final    Color, UA 03/28/2025 Yellow  Yellow, Straw Final    Appearance, UA 03/28/2025 Clear  Clear Final    pH, UA 03/28/2025 7.0  5.0 - 8.0 Final    Specific Gravity, UA 03/28/2025 1.011  1.005 - 1.030 Final    Glucose, UA 03/28/2025 Negative  Negative Final    Ketones, UA 03/28/2025 Negative  Negative Final    Bilirubin, UA 03/28/2025 Negative  Negative Final    Blood, UA 03/28/2025 Negative  Negative Final    Protein, UA 03/28/2025 Negative  Negative Final    Leuk Esterase, UA 03/28/2025 Negative  Negative Final    Nitrite, UA 03/28/2025 Negative  Negative Final    Urobilinogen, UA 03/28/2025 1.0 E.U./dL  0.2 - 1.0 E.U./dL Final    C-Reactive Protein 03/28/2025 0.34  0.00 - 0.50 mg/dL Final    Lactate 03/28/2025 1.3  0.5 - 2.0 mmol/L Final    Magnesium 03/28/2025 1.9  1.6 - 2.4 mg/dL Final    WBC 03/28/2025 5.58  3.40 - 10.80 10*3/mm3 Final    RBC 03/28/2025 4.53  3.77 - 5.28 10*6/mm3 Final     Hemoglobin 03/28/2025 13.7  12.0 - 15.9 g/dL Final    Hematocrit 03/28/2025 40.6  34.0 - 46.6 % Final    MCV 03/28/2025 89.6  79.0 - 97.0 fL Final    MCH 03/28/2025 30.2  26.6 - 33.0 pg Final    MCHC 03/28/2025 33.7  31.5 - 35.7 g/dL Final    RDW 03/28/2025 13.4  12.3 - 15.4 % Final    RDW-SD 03/28/2025 44.1  37.0 - 54.0 fl Final    MPV 03/28/2025 9.1  6.0 - 12.0 fL Final    Platelets 03/28/2025 251  140 - 450 10*3/mm3 Final    Neutrophil % 03/28/2025 67.7  42.7 - 76.0 % Final    Lymphocyte % 03/28/2025 23.3  19.6 - 45.3 % Final    Monocyte % 03/28/2025 7.0  5.0 - 12.0 % Final    Eosinophil % 03/28/2025 0.7  0.3 - 6.2 % Final    Basophil % 03/28/2025 0.9  0.0 - 1.5 % Final    Immature Grans % 03/28/2025 0.4  0.0 - 0.5 % Final    Neutrophils, Absolute 03/28/2025 3.78  1.70 - 7.00 10*3/mm3 Final    Lymphocytes, Absolute 03/28/2025 1.30  0.70 - 3.10 10*3/mm3 Final    Monocytes, Absolute 03/28/2025 0.39  0.10 - 0.90 10*3/mm3 Final    Eosinophils, Absolute 03/28/2025 0.04  0.00 - 0.40 10*3/mm3 Final    Basophils, Absolute 03/28/2025 0.05  0.00 - 0.20 10*3/mm3 Final    Immature Grans, Absolute 03/28/2025 0.02  0.00 - 0.05 10*3/mm3 Final    nRBC 03/28/2025 0.0  0.0 - 0.2 /100 WBC Final        MRI Brain With & Without Contrast  Result Date: 4/1/2025  Narrative: PROCEDURE: MRI BRAIN W WO CONTRAST-  HISTORY: Worsening dizizness, small cell lung cancer; 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  PROCEDURE: Multiplanar MR imaging of the brain was performed in multiple MR sequences with and without contrast.  FINDINGS: Brain parenchyma displays normal signal without evidence of mass, hemorrhage or edema. Few small foci of abnormal T2 FLAIR signal noted bilaterally consistent with very mild small vessel ischemic disease, similar to prior exam. Motion artifact noted on multiple images. Limited  images the proximal cord are unremarkable. The ventricles are mildly dilated indicating atrophy appropriate for age. There is no extra-axial fluid or midline shift. Flow-voids are appropriate. Diffusion weighted images demonstrate no evidence of acute CVA. Limited images of the paranasal sinuses demonstrate stable nodularity midline of the frontal sinuses. Remaining paranasal sinuses and mastoids are clear. Postcontrast images demonstrate no abnormal enhancement.      Impression: No acute intracranial process.  Suboptimal exam secondary to motion artifact on some images.  Stable, mild, age-appropriate chronic change.    This report was signed and finalized on 4/1/2025 10:31 AM by Parvin Cardenas MD.      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  COMPARISON: None.  PROCEDURE: The patient was injected with IV contrast. Oral contrast was administered. Axial images were obtained from the lung apex to the pubic symphysis by computed tomography. This study was performed with techniques to keep radiation doses as low as reasonably achievable, (ALARA). Individualized dose reduction techniques using automated exposure control or adjustment of mA and/or kV according to the patient size were employed.  FINDINGS:  CHEST: There is no axillary adenopathy. There is stable soft tissue attenuation in the right hilum. Streak artifact from patient's arm position noted. No new suspicious mediastinal or left hilar adenopathy seen. Vascular calcifications noted. The heart size is normal. There is no pericardial or pleural effusion. Again noted is the mixed density lesion in posterior  right lower lobe best seen series 3 image 51. 6 mm right middle lobe nodule is stable as seen on series 3 image 74. Emphysematous change noted bilaterally. Motion artifact noted on some images. Pleural-based subcentimeter nodularity, posterior left lower lobe is stable. 4 mm lateral left lower lobe nodule is stable from prior.  No bony destructive lesion seen.  ABDOMEN: The liver is homogenous with no focal abnormality. Liver is mildly enlarged at 17 mm. Minimally nodular contour noted; recommend correlation with liver function tests. 14 mm, circumscribed left adrenal mass is stable from prior exam. No bony destructive lesion seen. Gallbladder is present and is small and contracted with mild wall thickening. No CT visible stones identified. The spleen is unremarkable. No adrenal mass is present.  The pancreas is unremarkable. The kidneys are unremarkable, without evidence of mass or hydronephrosis. The aorta is normal in caliber. There is no free fluid or adenopathy.  PELVIS: The GI tract demonstrates no obstruction. The appendix is identified and appears normal. The urinary bladder is mostly collapsed with no abnormality seen. Uterus is diminutive or absent. There is no free fluid, adenopathy, or inflammatory process. Vascular calcifications noted.      Impression: Stable bilateral subcentimeter noncalcified pulmonary nodules and right hilar soft tissue attenuation compared to prior; recommend continued follow-up.  Mild hepatomegaly with minimally nodular contour but no focal mass; recommend correlation with liver function tests.  Stable left adrenal mass; recommend continued follow-up.   CTDI: 6.89 mGy DLP:335.48 mGy.cm  This report was signed and finalized on 4/1/2025 8:49 AM by Parvin Cardenas MD.      CT Soft Tissue Neck With Contrast  Result Date: 3/31/2025  Narrative: PROCEDURE: CT SOFT TISSUE NECK W CONTRAST-  HISTORY: Follow up small cell lung cancer; C77.2-Secondary and unspecified malignant neoplasm of  intra-abdominal lymph nodes; C34.31-Malignant neoplasm of lower lobe, right bronchus or lung  COMPARISON: None.  TECHNIQUE: Thin section axial CT images were obtained through the neck after intravenous contrast administration.  This study was performed with techniques to keep radiation doses as low as reasonably achievable, (ALARA). Individualized dose reduction techniques using automated exposure control or adjustment of mA and/or kV according to the patient size were employed.  FINDINGS: There is no adenopathy or mass lesion present. The thyroid is unremarkable. Limited images of the lung apices demonstrate emphysematous change bilaterally. The glottis and supraglottic areas are unremarkable. No acute osseus abnormality is identified. There is a symmetric appearance to the parotid glands and submandibular glands bilaterally.      Impression: Stable chronic findings.     CTDI: 6.89 mGy DLP:335.48 mGy.cm  This report was signed and finalized on 3/31/2025 5:11 PM by Parvin Cardenas MD.      CT Abdomen Pelvis With Contrast  Result Date: 3/28/2025  Narrative: PROCEDURE: CT ABDOMEN PELVIS W CONTRAST-  TECHNIQUE: IV contrast enhanced exam  HISTORY: Left lower quadrant, left hip pain, history of metastatic lung cancer, eval metastasis, diverticulitis, other acute process  COMPARISON: 11/19/2024.  FINDINGS:  ABDOMEN: Solid organs are unremarkable. Gallbladder is normal. There is no bowel obstruction or bowel wall thickening. No adenopathy is present.  PELVIS: No changes of diverticulitis are present. There is no bowel dilatation. Saud appendix is present as a normal variant. Patient is status post hysterectomy.      Impression: 1. No evidence of metastatic disease. 2. No evidence of diverticulitis or abnormality to account for symptoms.   This study was performed with techniques to keep radiation doses as low as reasonably achievable (ALARA). Individualized dose reduction techniques using automated exposure control or  adjustment of vA and/or kV according to the patient size were employed.  This report was signed and finalized on 3/28/2025 3:16 PM by Ezequiel Shah MD.      XR Spine Lumbar AP & Lateral  Result Date: 3/18/2025  Narrative:     EXAM:  XR LUMBAR SPINE 2-3 VIEWS     INDICATION: Low back pain.  COMPARISON: None.  FINDINGS:  ALIGNMENT: Mild retrolisthesis at L2-3.  VERTEBRAL BODIES: Normal heights. No fracture. Endplate sclerosis and osteophytes especially at L2-3.  DISK SPACES: Moderate height loss at L2-3.  JOINT SPACES: Lower lumbar-predominant multilevel facet hypertrophy.  SOFT TISSUES: Diffuse calcific atherosclerosis.       Impression:     No acute finding.  Multilevel lumbar spondylosis.  Created by: Chris Lyons MD  Signed by: Chris Lyons MD  Signed on: 3/18/2025 12:54 PM  Location: [id number]            XR Hip With or Without Pelvis 2 - 3 View Left  Result Date: 3/18/2025  Narrative:     EXAM:  XR HIP 2-3 VIEWS LEFT     INDICATION: Left-sided pain, evaluate for metastatic disease  COMPARISON: None  FINDINGS:  BONES: No fracture. No destructive bone lesions.  JOINT SPACES: Well preserved. No dislocation or erosion.    SOFT TISSUES: Unremarkable.     Impression:  No acute finding or radiographic evidence of metastatic disease.  Created by: Cam Ramírez MD  Signed by: Cam Ramírez MD  Signed on: 3/18/2025 11:05 AM  Location: [id number]                ASSESSMENT: The patient is a very pleasant 64 y.o. female  with small cell lung cancer      PLAN:    1. Right lower lobe small cell lung cancer TxN2 M1b stage [place]:  A.  I will proceed with treatment as scheduled today maintenance immunotherapy Imfinzi cycle #12.  B.  She will follow-up with us in 4 weeks for cycle #13.  C. We reviewed potential side effects of immunotherapy including but not limited to immune mediated reactions with thyroiditis, pneumonitis, hepatitis, colitis, rash, and electrolyte abnormalities, fatigue, multiorgan failure, and possibly  death.  D.  I will continue to monitor the patient blood work including blood counts kidney function liver function and electrolytes.  E.  I did go over the scan results with the patient from March 31, 2025.  I reassured the patient that her scans are essentially stable without any evidence of new or progressive disease.  I will do 4 months follow-up scan which will be due end of July 2025.  F.  I discussed with the patient mother results from March 12, 2025 and reassured her CBC looked normal.  CMP revealed alkaline phosphatase 128.  I will follow-up on labs from today.    2. Lambert-Eaton syndrome:  A. She will continue Firdapse 10 mg 4 times daily as prescribed by neurology.  B.  I do think this should be a contraindication for her immunotherapy since chemotherapy with treating the underlying cause for her neurologic syndrome.  C.  She was unable to tolerate plasma exchange.  She has declined any further exchanges  D.  I recommend to keep Imuran on hold given her worsening liver enzymes.    3. Hypothyroid:  A. She will continue Synthroid daily    4.  Poor systolic cardiac function:  A.  Status post defibrillator placement.    5.  Left hip pain:  A.  She is scheduled to see Ortho next week.    6.  Chemotherapy-induced nausea:  A.  I will continue Zofran as needed.    FOLLOW UP: 4 weeks with cycle 13.    Susi Irving MD  4/2/2025

## 2025-03-28 ENCOUNTER — HOSPITAL ENCOUNTER (EMERGENCY)
Facility: HOSPITAL | Age: 65
Discharge: HOME | End: 2025-03-28
Attending: STUDENT IN AN ORGANIZED HEALTH CARE EDUCATION/TRAINING PROGRAM
Payer: MEDICARE

## 2025-03-28 ENCOUNTER — APPOINTMENT (OUTPATIENT)
Dept: CT IMAGING | Facility: HOSPITAL | Age: 65
End: 2025-03-28
Payer: MEDICARE

## 2025-03-28 ENCOUNTER — TELEPHONE (OUTPATIENT)
Dept: ONCOLOGY | Facility: CLINIC | Age: 65
End: 2025-03-28
Payer: MEDICARE

## 2025-03-28 VITALS
HEART RATE: 87 BPM | HEIGHT: 66 IN | OXYGEN SATURATION: 94 % | DIASTOLIC BLOOD PRESSURE: 79 MMHG | SYSTOLIC BLOOD PRESSURE: 103 MMHG | BODY MASS INDEX: 23.3 KG/M2 | RESPIRATION RATE: 20 BRPM | WEIGHT: 145 LBS | TEMPERATURE: 97.6 F

## 2025-03-28 DIAGNOSIS — M79.605 LEG PAIN, ANTERIOR, LEFT: Primary | ICD-10-CM

## 2025-03-28 LAB
ALBUMIN SERPL-MCNC: 4.5 G/DL (ref 3.5–5.2)
ALBUMIN/GLOB SERPL: 1.7 G/DL
ALP SERPL-CCNC: 131 U/L (ref 39–117)
ALT SERPL W P-5'-P-CCNC: 16 U/L (ref 1–33)
ANION GAP SERPL CALCULATED.3IONS-SCNC: 10.6 MMOL/L (ref 5–15)
AST SERPL-CCNC: 19 U/L (ref 1–32)
BASOPHILS # BLD AUTO: 0.05 10*3/MM3 (ref 0–0.2)
BASOPHILS NFR BLD AUTO: 0.9 % (ref 0–1.5)
BILIRUB SERPL-MCNC: 0.3 MG/DL (ref 0–1.2)
BILIRUB UR QL STRIP: NEGATIVE
BUN SERPL-MCNC: 8 MG/DL (ref 8–23)
BUN/CREAT SERPL: 11.3 (ref 7–25)
CALCIUM SPEC-SCNC: 10.2 MG/DL (ref 8.6–10.5)
CHLORIDE SERPL-SCNC: 101 MMOL/L (ref 98–107)
CLARITY UR: CLEAR
CO2 SERPL-SCNC: 25.4 MMOL/L (ref 22–29)
COLOR UR: YELLOW
CREAT SERPL-MCNC: 0.71 MG/DL (ref 0.57–1)
CRP SERPL-MCNC: 0.34 MG/DL (ref 0–0.5)
D-LACTATE SERPL-SCNC: 1.3 MMOL/L (ref 0.5–2)
DEPRECATED RDW RBC AUTO: 44.1 FL (ref 37–54)
EGFRCR SERPLBLD CKD-EPI 2021: 95.7 ML/MIN/1.73
EOSINOPHIL # BLD AUTO: 0.04 10*3/MM3 (ref 0–0.4)
EOSINOPHIL NFR BLD AUTO: 0.7 % (ref 0.3–6.2)
ERYTHROCYTE [DISTWIDTH] IN BLOOD BY AUTOMATED COUNT: 13.4 % (ref 12.3–15.4)
GLOBULIN UR ELPH-MCNC: 2.7 GM/DL
GLUCOSE SERPL-MCNC: 128 MG/DL (ref 65–99)
GLUCOSE UR STRIP-MCNC: NEGATIVE MG/DL
HCT VFR BLD AUTO: 40.6 % (ref 34–46.6)
HGB BLD-MCNC: 13.7 G/DL (ref 12–15.9)
HGB UR QL STRIP.AUTO: NEGATIVE
IMM GRANULOCYTES # BLD AUTO: 0.02 10*3/MM3 (ref 0–0.05)
IMM GRANULOCYTES NFR BLD AUTO: 0.4 % (ref 0–0.5)
KETONES UR QL STRIP: NEGATIVE
LEUKOCYTE ESTERASE UR QL STRIP.AUTO: NEGATIVE
LIPASE SERPL-CCNC: 17 U/L (ref 13–60)
LYMPHOCYTES # BLD AUTO: 1.3 10*3/MM3 (ref 0.7–3.1)
LYMPHOCYTES NFR BLD AUTO: 23.3 % (ref 19.6–45.3)
MAGNESIUM SERPL-MCNC: 1.9 MG/DL (ref 1.6–2.4)
MCH RBC QN AUTO: 30.2 PG (ref 26.6–33)
MCHC RBC AUTO-ENTMCNC: 33.7 G/DL (ref 31.5–35.7)
MCV RBC AUTO: 89.6 FL (ref 79–97)
MONOCYTES # BLD AUTO: 0.39 10*3/MM3 (ref 0.1–0.9)
MONOCYTES NFR BLD AUTO: 7 % (ref 5–12)
NEUTROPHILS NFR BLD AUTO: 3.78 10*3/MM3 (ref 1.7–7)
NEUTROPHILS NFR BLD AUTO: 67.7 % (ref 42.7–76)
NITRITE UR QL STRIP: NEGATIVE
NRBC BLD AUTO-RTO: 0 /100 WBC (ref 0–0.2)
PH UR STRIP.AUTO: 7 [PH] (ref 5–8)
PLATELET # BLD AUTO: 251 10*3/MM3 (ref 140–450)
PMV BLD AUTO: 9.1 FL (ref 6–12)
POTASSIUM SERPL-SCNC: 4.8 MMOL/L (ref 3.5–5.2)
PROT SERPL-MCNC: 7.2 G/DL (ref 6–8.5)
PROT UR QL STRIP: NEGATIVE
RBC # BLD AUTO: 4.53 10*6/MM3 (ref 3.77–5.28)
SODIUM SERPL-SCNC: 137 MMOL/L (ref 136–145)
SP GR UR STRIP: 1.01 (ref 1–1.03)
UROBILINOGEN UR QL STRIP: NORMAL
WBC NRBC COR # BLD AUTO: 5.58 10*3/MM3 (ref 3.4–10.8)

## 2025-03-28 PROCEDURE — 83735 ASSAY OF MAGNESIUM: CPT | Performed by: STUDENT IN AN ORGANIZED HEALTH CARE EDUCATION/TRAINING PROGRAM

## 2025-03-28 PROCEDURE — 25010000002 ONDANSETRON PER 1 MG: Performed by: STUDENT IN AN ORGANIZED HEALTH CARE EDUCATION/TRAINING PROGRAM

## 2025-03-28 PROCEDURE — 83605 ASSAY OF LACTIC ACID: CPT | Performed by: STUDENT IN AN ORGANIZED HEALTH CARE EDUCATION/TRAINING PROGRAM

## 2025-03-28 PROCEDURE — 96374 THER/PROPH/DIAG INJ IV PUSH: CPT

## 2025-03-28 PROCEDURE — 96375 TX/PRO/DX INJ NEW DRUG ADDON: CPT

## 2025-03-28 PROCEDURE — 80053 COMPREHEN METABOLIC PANEL: CPT | Performed by: STUDENT IN AN ORGANIZED HEALTH CARE EDUCATION/TRAINING PROGRAM

## 2025-03-28 PROCEDURE — 81003 URINALYSIS AUTO W/O SCOPE: CPT | Performed by: STUDENT IN AN ORGANIZED HEALTH CARE EDUCATION/TRAINING PROGRAM

## 2025-03-28 PROCEDURE — 25010000002 MORPHINE PER 10 MG: Performed by: STUDENT IN AN ORGANIZED HEALTH CARE EDUCATION/TRAINING PROGRAM

## 2025-03-28 PROCEDURE — 25510000001 IOPAMIDOL 61 % SOLUTION: Performed by: STUDENT IN AN ORGANIZED HEALTH CARE EDUCATION/TRAINING PROGRAM

## 2025-03-28 PROCEDURE — 74177 CT ABD & PELVIS W/CONTRAST: CPT

## 2025-03-28 PROCEDURE — 99285 EMERGENCY DEPT VISIT HI MDM: CPT | Performed by: STUDENT IN AN ORGANIZED HEALTH CARE EDUCATION/TRAINING PROGRAM

## 2025-03-28 PROCEDURE — 86140 C-REACTIVE PROTEIN: CPT | Performed by: STUDENT IN AN ORGANIZED HEALTH CARE EDUCATION/TRAINING PROGRAM

## 2025-03-28 PROCEDURE — 83690 ASSAY OF LIPASE: CPT | Performed by: STUDENT IN AN ORGANIZED HEALTH CARE EDUCATION/TRAINING PROGRAM

## 2025-03-28 PROCEDURE — 85025 COMPLETE CBC W/AUTO DIFF WBC: CPT | Performed by: STUDENT IN AN ORGANIZED HEALTH CARE EDUCATION/TRAINING PROGRAM

## 2025-03-28 RX ORDER — IOPAMIDOL 612 MG/ML
100 INJECTION, SOLUTION INTRAVASCULAR
Status: COMPLETED | OUTPATIENT
Start: 2025-03-28 | End: 2025-03-28

## 2025-03-28 RX ORDER — OXYCODONE AND ACETAMINOPHEN 5; 325 MG/1; MG/1
1 TABLET ORAL EVERY 6 HOURS PRN
Qty: 12 TABLET | Refills: 0 | Status: SHIPPED | OUTPATIENT
Start: 2025-03-28 | End: 2025-03-28

## 2025-03-28 RX ORDER — OXYCODONE AND ACETAMINOPHEN 5; 325 MG/1; MG/1
1 TABLET ORAL EVERY 6 HOURS PRN
Qty: 12 TABLET | Refills: 0 | Status: SHIPPED | OUTPATIENT
Start: 2025-03-28

## 2025-03-28 RX ORDER — ONDANSETRON 2 MG/ML
4 INJECTION INTRAMUSCULAR; INTRAVENOUS ONCE
Status: COMPLETED | OUTPATIENT
Start: 2025-03-28 | End: 2025-03-28

## 2025-03-28 RX ADMIN — ONDANSETRON 4 MG: 2 INJECTION INTRAMUSCULAR; INTRAVENOUS at 14:19

## 2025-03-28 RX ADMIN — MORPHINE SULFATE 4 MG: 4 INJECTION, SOLUTION INTRAMUSCULAR; INTRAVENOUS at 14:20

## 2025-03-28 RX ADMIN — IOPAMIDOL 100 ML: 612 INJECTION, SOLUTION INTRAVENOUS at 15:03

## 2025-03-28 NOTE — TELEPHONE ENCOUNTER
RN discussed with MD. Advised that we will see patient on Wednesday with scans, cannot prescribe any pain meds without seeing her first. If patient is in severe pain and cannot make it that long, advised to go to ER. RN called Maris back. Communicated MD recommendations. Maris states she will take her to [facility] ER to try to get her some relief. RN will update MD.

## 2025-03-28 NOTE — DISCHARGE INSTRUCTIONS
You were evaluated for left hip pain.  We got lab work and a CT scan which showed no concerns for significant infection or hip fracture or metastasis from your lung disease.  Given your description of a knot to the area prior to coming in we believe you may have a soft tissue injury to the area.  We believe you are now stable for discharge.  We have sent a course of Percocet to help with pain at home and of also sent referral for you to follow-up with our orthopedic group to ensure that you heal appropriately.  You are now stable for discharge.

## 2025-03-28 NOTE — Clinical Note
[facility] EMERGENCY DEPARTMENT  [address] [address]  Phone: [phone]    Gayatri Kahn was seen and treated in our emergency department on 3/28/2025.  She may return to work on 04/02/2025.         Thank you for choosing [facility].    Tobias Christian MD

## 2025-03-28 NOTE — ED PROVIDER NOTES
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  Reports increasing left hip pain over the last week.  Reports that she reported this today to her oncologist who told her present to our emergency department.  She states left lower quadrant left hip pain that is worse with activity.  Reports that she felt as though she felt a \"knot\" to the area several days ago which is resolved but the pain has increased.  Denies any trauma to the area.  No fevers.  Denies any dysuria.  No nausea or vomiting.  Denies any new chest pain or new shortness of breath.  No leg swelling or leg pain.      Nurses Notes reviewed and agree, including vitals, allergies, social history and prior medical history.     REVIEW OF SYSTEMS: All systems reviewed and not pertinent unless noted.  Review of Systems   Constitutional:  Negative for activity change, appetite change, chills, fatigue and fever.   HENT:  Negative for rhinorrhea, sinus pressure and sinus pain.    Eyes:  Negative for discharge and itching.   Respiratory:  Negative for cough and shortness of breath.    Cardiovascular:  Negative for chest pain and leg swelling.   Gastrointestinal:  Positive for abdominal pain. Negative for abdominal distention, nausea and vomiting.   Endocrine: Negative for cold intolerance and heat intolerance.   Genitourinary:  Negative for decreased urine volume, difficulty urinating, flank pain, frequency, urgency, vaginal bleeding, vaginal discharge and vaginal pain.   Musculoskeletal:  Positive for arthralgias. Negative for gait problem, neck pain and neck stiffness.   Skin:  Negative for color change.   Allergic/Immunologic: Negative for environmental allergies.   Neurological:  Negative for seizures, syncope, facial asymmetry and speech difficulty.   Psychiatric/Behavioral:  Negative for " "self-injury and suicidal ideas.        Past Medical History:   Diagnosis Date    Arthritis     Disease of thyroid gland     Heart disease     History of radiation therapy 2023    Abdominal metastasis    Hyperlipidemia     Lambert-Eaton syndrome     Lung cancer 2021    XRT (45 Gray/15 fractions) - completed 3/2021    Metastatic cancer     Pneumonia     Requires supplemental oxygen     2L, NC at night and PRN in day       Allergies:    Erythromycin      Past Surgical History:   Procedure Laterality Date    FOOT SURGERY      HYSTERECTOMY      KNEE SURGERY           Social History     Socioeconomic History    Marital status:    Tobacco Use    Smoking status: Former     Current packs/day: 0.00     Average packs/day: 1 pack/day for 15.0 years (15.0 ttl pk-yrs)     Types: Cigarettes     Start date: 2007     Quit date: 2022     Years since quittin.6    Smokeless tobacco: Never   Vaping Use    Vaping status: Never Used   Substance and Sexual Activity    Alcohol use: No    Drug use: No    Sexual activity: Defer         Family History   Problem Relation Age of Onset    Heart disease Mother     Kidney disease Mother     Brain cancer Father     Heart disease Sister     Breast cancer Sister     Heart disease Brother        Objective  Physical Exam:  /75 (BP Location: Left arm, Patient Position: Sitting)   Pulse 87   Temp 97.6 °F (36.4 °C) (Oral)   Resp 20   Ht 167.6 cm (66\")   Wt 65.8 kg (145 lb)   SpO2 94%   BMI 23.40 kg/m²      Physical Exam  Constitutional:       General: She is not in acute distress.     Appearance: Normal appearance. She is not ill-appearing.   HENT:      Head: Normocephalic and atraumatic.      Nose: Nose normal. No congestion or rhinorrhea.      Mouth/Throat:      Mouth: Mucous membranes are dry.      Pharynx: Oropharynx is clear. No oropharyngeal exudate or posterior oropharyngeal erythema.   Eyes:      Extraocular Movements: Extraocular movements intact.      " Conjunctiva/sclera: Conjunctivae normal.      Pupils: Pupils are equal, round, and reactive to light.   Cardiovascular:      Rate and Rhythm: Normal rate and regular rhythm.      Pulses: Normal pulses.      Heart sounds: Normal heart sounds.   Pulmonary:      Effort: Pulmonary effort is normal. No respiratory distress.      Breath sounds: Normal breath sounds.   Abdominal:      General: Abdomen is flat. Bowel sounds are normal. There is no distension.      Palpations: Abdomen is soft.      Tenderness: There is abdominal tenderness. There is no right CVA tenderness, left CVA tenderness, guarding or rebound.   Musculoskeletal:         General: Tenderness present. No swelling or signs of injury. Normal range of motion.      Cervical back: Normal range of motion and neck supple.      Comments: Patient tender to palpation to the area of the left hip with no obvious crepitus or deformity   Skin:     General: Skin is warm and dry.      Capillary Refill: Capillary refill takes less than 2 seconds.   Neurological:      General: No focal deficit present.      Mental Status: She is alert and oriented to person, place, and time. Mental status is at baseline.      Cranial Nerves: No cranial nerve deficit.      Sensory: No sensory deficit.      Motor: No weakness.      Coordination: Coordination normal.   Psychiatric:         Mood and Affect: Mood normal.         Behavior: Behavior normal.         Thought Content: Thought content normal.         Judgment: Judgment normal.         Procedures    ED Course:    ED Course as of 03/28/25 1537   Fri Mar 28, 2025   1533 Patient requested pharmacy change after discharge medications prescribed.  Given controlled nature of substance recommended nurse called to the other pharmacy to cancel that order and have prescribed patient's medication to our pharmacy as requested. [JE]      ED Course User Index  [JE] Tobias Christian MD       Lab Results (last 24 hours)       Procedure Component  Value Units Date/Time    CBC & Differential [[id number]]  (Normal) Collected: 03/28/25 1413    Specimen: Blood Updated: 03/28/25 1420    Narrative:      The following orders were created for panel order CBC & Differential.  Procedure                               Abnormality         Status                     ---------                               -----------         ------                     CBC Auto Differential[[id number]]        Normal              Final result                 Please view results for these tests on the individual orders.    Comprehensive Metabolic Panel [[id number]]  (Abnormal) Collected: 03/28/25 1413    Specimen: Blood Updated: 03/28/25 1443     Glucose 128 mg/dL      BUN 8 mg/dL      Creatinine 0.71 mg/dL      Sodium 137 mmol/L      Potassium 4.8 mmol/L      Comment: Slight hemolysis detected by analyzer. Result may be falsely elevated.        Chloride 101 mmol/L      CO2 25.4 mmol/L      Calcium 10.2 mg/dL      Total Protein 7.2 g/dL      Albumin 4.5 g/dL      ALT (SGPT) 16 U/L      AST (SGOT) 19 U/L      Alkaline Phosphatase 131 U/L      Total Bilirubin 0.3 mg/dL      Globulin 2.7 gm/dL      A/G Ratio 1.7 g/dL      BUN/Creatinine Ratio 11.3     Anion Gap 10.6 mmol/L      eGFR 95.7 mL/min/1.73     Narrative:      GFR Categories in Chronic Kidney Disease (CKD)      GFR Category          GFR (mL/min/1.73)    Interpretation  G1                     90 or greater         Normal or high (1)  G2                      60-89                Mild decrease (1)  G3a                   45-59                Mild to moderate decrease  G3b                   30-44                Moderate to severe decrease  G4                    15-29                Severe decrease  G5                    14 or less           Kidney failure          (1)In the absence of evidence of kidney disease, neither GFR category G1 or G2 fulfill the criteria for CKD.    eGFR calculation 2021 CKD-EPI creatinine equation, which does not  include race as a factor    Lipase [[id number]]  (Normal) Collected: 03/28/25 1413    Specimen: Blood Updated: 03/28/25 1443     Lipase 17 U/L     C-reactive Protein [[id number]]  (Normal) Collected: 03/28/25 1413    Specimen: Blood Updated: 03/28/25 1443     C-Reactive Protein 0.34 mg/dL     Lactic Acid, Plasma [[id number]]  (Normal) Collected: 03/28/25 1413    Specimen: Blood Updated: 03/28/25 1435     Lactate 1.3 mmol/L     Magnesium [[id number]]  (Normal) Collected: 03/28/25 1413    Specimen: Blood Updated: 03/28/25 1443     Magnesium 1.9 mg/dL     CBC Auto Differential [[id number]]  (Normal) Collected: 03/28/25 1413    Specimen: Blood Updated: 03/28/25 1420     WBC 5.58 10*3/mm3      RBC 4.53 10*6/mm3      Hemoglobin 13.7 g/dL      Hematocrit 40.6 %      MCV 89.6 fL      MCH 30.2 pg      MCHC 33.7 g/dL      RDW 13.4 %      RDW-SD 44.1 fl      MPV 9.1 fL      Platelets 251 10*3/mm3      Neutrophil % 67.7 %      Lymphocyte % 23.3 %      Monocyte % 7.0 %      Eosinophil % 0.7 %      Basophil % 0.9 %      Immature Grans % 0.4 %      Neutrophils, Absolute 3.78 10*3/mm3      Lymphocytes, Absolute 1.30 10*3/mm3      Monocytes, Absolute 0.39 10*3/mm3      Eosinophils, Absolute 0.04 10*3/mm3      Basophils, Absolute 0.05 10*3/mm3      Immature Grans, Absolute 0.02 10*3/mm3      nRBC 0.0 /100 WBC     Urinalysis With Culture If Indicated - Urine, Clean Catch [[id number]]  (Normal) Collected: 03/28/25 1414    Specimen: Urine, Clean Catch Updated: 03/28/25 1432     Color, UA Yellow     Appearance, UA Clear     pH, UA 7.0     Specific Gravity, UA 1.011     Glucose, UA Negative     Ketones, UA Negative     Bilirubin, UA Negative     Blood, UA Negative     Protein, UA Negative     Leuk Esterase, UA Negative     Nitrite, UA Negative     Urobilinogen, UA 1.0 E.U./dL    Narrative:      In absence of clinical symptoms, the presence of pyuria, bacteria, and/or nitrites on the urinalysis result does not correlate with infection.  Urine  microscopic not indicated.             CT Abdomen Pelvis With Contrast  Result Date: 3/28/2025  PROCEDURE: CT ABDOMEN PELVIS W CONTRAST-  TECHNIQUE: IV contrast enhanced exam  HISTORY: Left lower quadrant, left hip pain, history of metastatic lung cancer, eval metastasis, diverticulitis, other acute process  COMPARISON: 11/19/2024.  FINDINGS:  ABDOMEN: Solid organs are unremarkable. Gallbladder is normal. There is no bowel obstruction or bowel wall thickening. No adenopathy is present.  PELVIS: No changes of diverticulitis are present. There is no bowel dilatation. Saud appendix is present as a normal variant. Patient is status post hysterectomy.      Impression: 1. No evidence of metastatic disease. 2. No evidence of diverticulitis or abnormality to account for symptoms.   This study was performed with techniques to keep radiation doses as low as reasonably achievable (ALARA). Individualized dose reduction techniques using automated exposure control or adjustment of vA and/or kV according to the patient size were employed.  This report was signed and finalized on 3/28/2025 3:16 PM by Ezequiel Shah MD.           MDM      Initial impression of presenting illness: Left hip pain, left lower quadrant pain    DDX: includes but is not limited to: Metastatic lung cancer, soft tissue injury, diverticulitis, perforated viscus, SBO    Patient arrives stable with vitals interpreted by myself.     Pertinent features from physical exam: Clear to auscultation, regular rate and rhythm, no murmur, tender to palpation to left lower quadrant, left hip with no guarding or rebound, no obvious deformity.    Initial diagnostic plan: CBC, CMP, lipase, UA, CRP, lactic acid, CT abdomen pelvis    Results from initial plan were reviewed and interpreted by me revealing no concern for metastatic disease, no concern for hip fracture, no concern for septic joint given lack of inflammatory markers and patient with no pain with micro movements on  exam    Diagnostic information from other sources: Discussed patient's daughter at bedside reviewed past medical records    Interventions / Re-evaluation: Given morphine, Zofran for symptom control    Results/clinical rationale were discussed with patient at bedside    Consultations/Discussion of results with other physicians: Baljit negative workup in emergency department, no concern for metastasis, no concern for fracture.  Suspect patient could have soft tissue injury given tenderness to palpation on exam and describing a knot prior to arrival.  Have referred patient to orthopedics and prescribed patient Percocet for pain control at home and encouraged her to follow with her primary care doctor.  Strict turn precaution for fevers, severe increase in abdominal pain.    Disposition plan: Discharge  -----        Final diagnoses:   Leg pain, anterior, left          Edge, Tobias Baltazar MD  03/28/25 9042

## 2025-03-28 NOTE — Clinical Note
[facility] EMERGENCY DEPARTMENT  [address] [address]  Phone: [phone]    Gayatri Kahn was seen and treated in our emergency department on 3/28/2025.  She may return to work on 04/02/2025.         Thank you for choosing [facility].    Tobias Christian MD

## 2025-03-28 NOTE — TELEPHONE ENCOUNTER
DAUGHTER CALLED THIS MORNING STATES PT STILL HAVING BACK PAINFOR ONE WK.   PT SEEN FAMILY DOCTOR MONDAY AND HE GAVE HER MUSCLE RELAXER AND ITS NOT HELPING,   DAUGHTER SAYS PAIN IN HIP/BACK AREA  AND ALSO KNOT SIZE OF A GOLF BALL.  DAUGHTER WAS GOING TO LEAVE WORK AND TAKE HER TO THE ER BUT WANTED TO SEE WHAT DR DAVIS THOUGHT FIRST

## 2025-03-31 ENCOUNTER — HOSPITAL ENCOUNTER (OUTPATIENT)
Dept: CT IMAGING | Facility: HOSPITAL | Age: 65
Discharge: HOME | End: 2025-03-31
Payer: MEDICARE

## 2025-03-31 ENCOUNTER — HOSPITAL ENCOUNTER (OUTPATIENT)
Dept: MRI IMAGING | Facility: HOSPITAL | Age: 65
Discharge: HOME | End: 2025-03-31
Payer: MEDICARE

## 2025-03-31 ENCOUNTER — TELEPHONE (OUTPATIENT)
Dept: ONCOLOGY | Facility: CLINIC | Age: 65
End: 2025-03-31
Payer: MEDICARE

## 2025-03-31 DIAGNOSIS — C34.31 SMALL CELL LUNG CANCER, RIGHT LOWER LOBE: ICD-10-CM

## 2025-03-31 DIAGNOSIS — C77.2 SECONDARY MALIGNANT NEOPLASM OF INTRA-ABDOMINAL LYMPH NODES: ICD-10-CM

## 2025-03-31 PROCEDURE — 71260 CT THORAX DX C+: CPT

## 2025-03-31 PROCEDURE — 74177 CT ABD & PELVIS W/CONTRAST: CPT

## 2025-03-31 PROCEDURE — 25510000002 GADOBENATE DIMEGLUMINE 529 MG/ML SOLUTION: Performed by: NURSE PRACTITIONER

## 2025-03-31 PROCEDURE — 70491 CT SOFT TISSUE NECK W/DYE: CPT

## 2025-03-31 PROCEDURE — A9577 INJ MULTIHANCE: HCPCS | Performed by: NURSE PRACTITIONER

## 2025-03-31 PROCEDURE — 70553 MRI BRAIN STEM W/O & W/DYE: CPT

## 2025-03-31 PROCEDURE — 25510000001 IOPAMIDOL 61 % SOLUTION: Performed by: NURSE PRACTITIONER

## 2025-03-31 RX ORDER — IOPAMIDOL 612 MG/ML
100 INJECTION, SOLUTION INTRAVASCULAR
Status: COMPLETED | OUTPATIENT
Start: 2025-03-31 | End: 2025-03-31

## 2025-03-31 RX ADMIN — GADOBENATE DIMEGLUMINE 10 ML: 529 INJECTION, SOLUTION INTRAVENOUS at 17:07

## 2025-03-31 RX ADMIN — IOPAMIDOL 100 ML: 612 INJECTION, SOLUTION INTRAVENOUS at 16:16

## 2025-03-31 NOTE — TELEPHONE ENCOUNTER
Patient's  called patient has a CT scan of her hip would this cover what she is scheduled for today? Please call.

## 2025-03-31 NOTE — TELEPHONE ENCOUNTER
RN called Leobardo back and let him know they only scanned her abdomen in the ER - we want to look at her chest and neck too, so she still needs to have her scans today. We will see her on Wednesday to go over results. Leobardo v/home and appreciation.

## 2025-04-01 ENCOUNTER — HOSPITAL ENCOUNTER (OUTPATIENT)
Dept: MRI IMAGING | Facility: HOSPITAL | Age: 65
Discharge: HOME | End: 2025-04-01
Payer: MEDICARE

## 2025-04-02 ENCOUNTER — OFFICE VISIT (OUTPATIENT)
Dept: ONCOLOGY | Facility: CLINIC | Age: 65
End: 2025-04-02
Payer: MEDICARE

## 2025-04-02 VITALS
HEART RATE: 104 BPM | RESPIRATION RATE: 16 BRPM | WEIGHT: 147.5 LBS | BODY MASS INDEX: 23.7 KG/M2 | OXYGEN SATURATION: 97 % | DIASTOLIC BLOOD PRESSURE: 66 MMHG | TEMPERATURE: 96.9 F | SYSTOLIC BLOOD PRESSURE: 102 MMHG | HEIGHT: 66 IN

## 2025-04-02 DIAGNOSIS — C34.31 SMALL CELL LUNG CANCER, RIGHT LOWER LOBE: ICD-10-CM

## 2025-04-02 DIAGNOSIS — C77.2 SECONDARY MALIGNANT NEOPLASM OF INTRA-ABDOMINAL LYMPH NODES: Primary | ICD-10-CM

## 2025-04-02 RX ORDER — METHOCARBAMOL 750 MG/1
750 TABLET, FILM COATED ORAL 4 TIMES DAILY
COMMUNITY
Start: 2025-03-18

## 2025-04-02 RX ORDER — HYDROCODONE BITARTRATE AND ACETAMINOPHEN 5; 325 MG/1; MG/1
TABLET ORAL
COMMUNITY
Start: 2025-03-18

## 2025-04-02 RX ORDER — SODIUM CHLORIDE 9 MG/ML
20 INJECTION, SOLUTION INTRAVENOUS ONCE
OUTPATIENT
Start: 2025-05-07

## 2025-04-02 RX ORDER — SODIUM CHLORIDE 9 MG/ML
20 INJECTION, SOLUTION INTRAVENOUS ONCE
OUTPATIENT
Start: 2025-04-09

## 2025-04-02 RX ORDER — POTASSIUM CHLORIDE 1500 MG/1
20 TABLET, EXTENDED RELEASE ORAL DAILY
COMMUNITY

## 2025-04-09 ENCOUNTER — HOSPITAL ENCOUNTER (OUTPATIENT)
Facility: HOSPITAL | Age: 65
Discharge: HOME | End: 2025-04-09
Payer: MEDICARE

## 2025-04-09 ENCOUNTER — TELEPHONE (OUTPATIENT)
Dept: ONCOLOGY | Facility: CLINIC | Age: 65
End: 2025-04-09

## 2025-04-09 VITALS
TEMPERATURE: 97.3 F | BODY MASS INDEX: 23.25 KG/M2 | DIASTOLIC BLOOD PRESSURE: 76 MMHG | HEART RATE: 93 BPM | SYSTOLIC BLOOD PRESSURE: 113 MMHG | RESPIRATION RATE: 14 BRPM | WEIGHT: 144 LBS | OXYGEN SATURATION: 97 %

## 2025-04-09 DIAGNOSIS — C34.31 SMALL CELL LUNG CANCER, RIGHT LOWER LOBE: Primary | ICD-10-CM

## 2025-04-09 DIAGNOSIS — C77.2 SECONDARY MALIGNANT NEOPLASM OF INTRA-ABDOMINAL LYMPH NODES: ICD-10-CM

## 2025-04-09 LAB
ALBUMIN SERPL-MCNC: 4.5 G/DL (ref 3.5–5.2)
ALBUMIN/GLOB SERPL: 1.6 G/DL
ALP SERPL-CCNC: 114 U/L (ref 39–117)
ALT SERPL W P-5'-P-CCNC: 12 U/L (ref 1–33)
ANION GAP SERPL CALCULATED.3IONS-SCNC: 8.4 MMOL/L (ref 5–15)
AST SERPL-CCNC: 14 U/L (ref 1–32)
BASOPHILS # BLD AUTO: 0.05 10*3/MM3 (ref 0–0.2)
BASOPHILS NFR BLD AUTO: 0.7 % (ref 0–1.5)
BILIRUB SERPL-MCNC: 0.3 MG/DL (ref 0–1.2)
BUN SERPL-MCNC: 11 MG/DL (ref 8–23)
BUN/CREAT SERPL: 18 (ref 7–25)
CALCIUM SPEC-SCNC: 10.5 MG/DL (ref 8.6–10.5)
CHLORIDE SERPL-SCNC: 106 MMOL/L (ref 98–107)
CO2 SERPL-SCNC: 25.6 MMOL/L (ref 22–29)
CREAT SERPL-MCNC: 0.61 MG/DL (ref 0.57–1)
DEPRECATED RDW RBC AUTO: 43.6 FL (ref 37–54)
EGFRCR SERPLBLD CKD-EPI 2021: 100.6 ML/MIN/1.73
EOSINOPHIL # BLD AUTO: 0.1 10*3/MM3 (ref 0–0.4)
EOSINOPHIL NFR BLD AUTO: 1.5 % (ref 0.3–6.2)
ERYTHROCYTE [DISTWIDTH] IN BLOOD BY AUTOMATED COUNT: 13.4 % (ref 12.3–15.4)
GLOBULIN UR ELPH-MCNC: 2.9 GM/DL
GLUCOSE SERPL-MCNC: 91 MG/DL (ref 65–99)
HCT VFR BLD AUTO: 43.4 % (ref 34–46.6)
HGB BLD-MCNC: 14.6 G/DL (ref 12–15.9)
IMM GRANULOCYTES # BLD AUTO: 0.01 10*3/MM3 (ref 0–0.05)
IMM GRANULOCYTES NFR BLD AUTO: 0.1 % (ref 0–0.5)
LYMPHOCYTES # BLD AUTO: 1.59 10*3/MM3 (ref 0.7–3.1)
LYMPHOCYTES NFR BLD AUTO: 23.8 % (ref 19.6–45.3)
MCH RBC QN AUTO: 30 PG (ref 26.6–33)
MCHC RBC AUTO-ENTMCNC: 33.6 G/DL (ref 31.5–35.7)
MCV RBC AUTO: 89.3 FL (ref 79–97)
MONOCYTES # BLD AUTO: 0.52 10*3/MM3 (ref 0.1–0.9)
MONOCYTES NFR BLD AUTO: 7.8 % (ref 5–12)
NEUTROPHILS NFR BLD AUTO: 4.42 10*3/MM3 (ref 1.7–7)
NEUTROPHILS NFR BLD AUTO: 66.1 % (ref 42.7–76)
NRBC BLD AUTO-RTO: 0 /100 WBC (ref 0–0.2)
PLATELET # BLD AUTO: 297 10*3/MM3 (ref 140–450)
PMV BLD AUTO: 9.1 FL (ref 6–12)
POTASSIUM SERPL-SCNC: 4.3 MMOL/L (ref 3.5–5.2)
PROT SERPL-MCNC: 7.4 G/DL (ref 6–8.5)
RBC # BLD AUTO: 4.86 10*6/MM3 (ref 3.77–5.28)
SODIUM SERPL-SCNC: 140 MMOL/L (ref 136–145)
T4 FREE SERPL-MCNC: 1.63 NG/DL (ref 0.92–1.68)
TSH SERPL DL<=0.05 MIU/L-ACNC: 1.27 UIU/ML (ref 0.27–4.2)
WBC NRBC COR # BLD AUTO: 6.69 10*3/MM3 (ref 3.4–10.8)

## 2025-04-09 PROCEDURE — 25010000002 DURVALUMAB 50 MG/ML SOLUTION 10 ML VIAL: Performed by: INTERNAL MEDICINE

## 2025-04-09 PROCEDURE — 85025 COMPLETE CBC W/AUTO DIFF WBC: CPT | Performed by: INTERNAL MEDICINE

## 2025-04-09 PROCEDURE — 25810000003 SODIUM CHLORIDE 0.9 % SOLUTION 250 ML FLEX CONT: Performed by: INTERNAL MEDICINE

## 2025-04-09 PROCEDURE — 84443 ASSAY THYROID STIM HORMONE: CPT | Performed by: INTERNAL MEDICINE

## 2025-04-09 PROCEDURE — 84439 ASSAY OF FREE THYROXINE: CPT | Performed by: INTERNAL MEDICINE

## 2025-04-09 PROCEDURE — 80053 COMPREHEN METABOLIC PANEL: CPT | Performed by: INTERNAL MEDICINE

## 2025-04-09 PROCEDURE — 96413 CHEMO IV INFUSION 1 HR: CPT

## 2025-04-09 RX ORDER — SODIUM CHLORIDE 9 MG/ML
20 INJECTION, SOLUTION INTRAVENOUS ONCE
Status: DISCONTINUED | OUTPATIENT
Start: 2025-04-09 | End: 2025-04-10 | Stop reason: HOSPADM

## 2025-04-09 RX ADMIN — SODIUM CHLORIDE 1500 MG: 9 INJECTION, SOLUTION INTRAVENOUS at 11:51

## 2025-04-09 NOTE — TELEPHONE ENCOUNTER
Caller: Gayatri Kahn    Relationship to patient: Self    Best call back number: [phone]    Chief complaint: SCHEDULING     Type of visit: FOLLOW UP AND INFUSION    Requested date: SAME DAY 5-7 REQUESTING TO MOVE TO 2 OR 2:30 IF APPT CANNOT BE MOVED PLEASE LEAVE ORIGINAL      If rescheduling, when is the original appointment: 5-7

## 2025-04-10 NOTE — TELEPHONE ENCOUNTER
Caller: Gayatri Kahn    Relationship: Self    Best call back number: [phone]     What is the best time to reach you: ANYTIME    Who are you requesting to speak with (clinical staff, provider,  specific staff member):     What was the call regarding: PLEASE DISREGARD REQUEST TO R/S 5/7 APPT TIME - PATIENT SWITCHED HER OTHER APPT AROUND.    SO JUST LEAVE AS IS - 5/7 AT 9:30 AM.

## 2025-04-17 ENCOUNTER — TELEPHONE (OUTPATIENT)
Dept: NUTRITION | Facility: HOSPITAL | Age: 65
End: 2025-04-17
Payer: MEDICARE

## 2025-04-17 NOTE — PROGRESS NOTES
Outpatient Oncology Nutrition Follow-up      Patient Name: Gayatri Kahn  YOB: 1960  MRN: [id number]      Follow-up Date:  04/17/25     Comments:      Called patient for nutrition follow-up regarding oncology treatment. No answer. Will follow-up in one month.         Electronically signed by:   Marni Nails RD  04/17/25 09:54 EDT

## 2025-05-06 NOTE — PROGRESS NOTES
DATE OF VISIT: 5/7/2025    REASON FOR VISIT: Followup for right lower lobe small cell lung cancer     PROBLEM LIST:  1. Right lower lobe small cell lung cancer T1 N1 M0 stage IIb:  A.  Presented with ataxia and lower extremities weakness  B.  Negative bronchoscopy August 19, 2020 and endobronchial ultrasound September 9, 2020  C.  CT chest done December 7, 2020 revealed 2.7 cm right hilar mass  D.  Whole-body PET scan done January 11, 2020 revealed isolated hypermetabolic activity at the right hilar mass SUV of 12  E. positive serology for AGN A-1 antibody which is 92% predicted for small cell lung cancer, positive N type calcium channel blocker and P/Q type calcium channel blooker.  F.  Started chest radiation February 26, 2021, completed end of March 2021  G.  Repeated PET scan done June 08, 2023 revealed complete resolution of activity in the chest however new portacaval hypermetabolic mass.  Biopsy-proven small cell cancer July 12, 2023  H.  Treated with definitive radiotherapy, completed July 2023.  I.  Progressive disease with worsening para-aortic lymphadenopathy document CT scans in February 1, 2024.  J.  Started chemotherapy with carboplatin etoposide and Imfinzi February 2024, Status post 4 cycles.  K.  Started maintenance Imfinzi May 8, 2024.  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  Hypertension  4.  Hypercholesteremia      HISTORY OF PRESENT ILLNESS: The patient is a very pleasant 64 y.o. female with past medical history significant for suspected small cell lung cancer of the right lung. The patient never had a positive biopsy however her serology was suggestive of SCLCA after presenting with Lambert-Eaton syndrome. The patient is here today for scheduled follow up visit with treatment maintenance therapy, Imfinzi cycle #13.      SUBJECTIVE: Gayatri is here today with her .  Overall she says she has been doing well.  Dizziness may be a little bit  improved.  Her neurologist put her on meclizine 3 times a day.  She is able to open her eyes now.  She still has some dizziness though.  She was in the ED with left hip pain and saw Ortho.  They did do an injection and that has significantly helped the pain.      Past History:  Medical History: has a past medical history of Arthritis, Disease of thyroid gland, Heart disease, History of radiation therapy (08/11/2023), Hyperlipidemia, Lambert-Eaton syndrome, Lung cancer (02/2021), Metastatic cancer, Pneumonia, and Requires supplemental oxygen.   Surgical History: has a past surgical history that includes Hysterectomy; Foot surgery; and Knee surgery.   Family History: family history includes Brain cancer in her father; Breast cancer in her sister; Heart disease in her brother, mother, and sister; Kidney disease in her mother.   Social History: reports that she quit smoking about 2 years ago. Her smoking use included cigarettes. She started smoking about 17 years ago. She has a 15 pack-year smoking history. She has never used smokeless tobacco. She reports that she does not drink alcohol and does not use drugs.    (Not in a hospital admission)     Allergies: Erythromycin     Review of Systems   Constitutional:  Positive for fatigue.   Respiratory: Negative.     Gastrointestinal: Negative.    Musculoskeletal:  Positive for gait problem.   Neurological:  Positive for weakness.         Current Outpatient Medications:     Amifampridine Phosphate (Firdapse) 10 MG tablet, Take 2 tablets by mouth 4 (Four) Times a Day., Disp: , Rfl:     aspirin 81 MG EC tablet, Take 1 tablet by mouth Daily. 7-6-23 last dose, Disp: , Rfl:     atorvastatin (LIPITOR) 20 MG tablet, , Disp: , Rfl:     azaTHIOprine (IMURAN) 50 MG tablet, Take 1 tablet (50 mg) by mouth 1 (one) time each day for 14 days, THEN 2 tablets (100 mg) 1 (one) time each day for 14 days, THEN 3 tablets (150 mg) 1 (one) time each day thereafter., Disp: , Rfl:     furosemide  "(LASIX) 40 MG tablet, Take 1 tablet by mouth Daily., Disp: , Rfl:     HYDROcodone-acetaminophen (NORCO) 5-325 MG per tablet, , Disp: , Rfl:     ipratropium-albuterol (DUO-NEB) 0.5-2.5 mg/3 ml nebulizer, , Disp: , Rfl:     levothyroxine (SYNTHROID, LEVOTHROID) 75 MCG tablet, Take 1 tablet by mouth Daily., Disp: , Rfl:     meclizine (ANTIVERT) 25 MG tablet, Take 1 tablet by mouth 3 (Three) Times a Day As Needed., Disp: , Rfl:     methocarbamol (ROBAXIN) 750 MG tablet, Take 1 tablet by mouth 4 (Four) Times a Day., Disp: , Rfl:     metoprolol tartrate (LOPRESSOR) 50 MG tablet, Take 0.5 tablets by mouth Daily As Needed. Patient states she takes as needed., Disp: , Rfl:     multivitamin with minerals tablet tablet, Take 1 tablet by mouth Daily., Disp: 30 each, Rfl: 0    naloxone (NARCAN) 4 MG/0.1ML nasal spray, Call 911. Don't prime. [place] in 1 nostril for overdose. Repeat in 2-3 minutes in other nostril if no or minimal breathing/responsiveness., Disp: 2 each, Rfl: 0    oxyCODONE-acetaminophen (PERCOCET) 5-325 MG per tablet, Take 1 tablet by mouth Every 6 (Six) Hours As Needed for Moderate Pain or Severe Pain., Disp: 12 tablet, Rfl: 0    potassium chloride (K-DUR,KLOR-CON) 20 MEQ CR tablet, Take 1 tablet by mouth Daily., Disp: , Rfl:     potassium chloride ER (K-TAB) 20 MEQ tablet controlled-release ER tablet, Take 1 tablet by mouth Daily., Disp: , Rfl:     spironolactone (ALDACTONE) 25 MG tablet, Take 1 tablet by mouth Daily., Disp: , Rfl:     PHYSICAL EXAMINATION:   /67   Pulse 101   Temp 98.9 °F (37.2 °C) (Temporal)   Resp 18   Ht 167.6 cm (65.98\")   Wt 66.5 kg (146 lb 8 oz)   SpO2 98%   BMI 23.66 kg/m²    Pain Score    05/07/25 0919   PainSc: 0-No pain                  ECOG Performance Status: 2 - Symptomatic, <50% confined to bed  General Appearance:  alert, cooperative, no apparent distress and appears stated age   Neurologic/Psychiatric: A&O x 3, gait steady, appropriate affect, strength 5/5 in all " muscle groups   HEENT:  Normocephalic, without obvious abnormality, mucous membranes moist   Neck: Supple, symmetrical, trachea midline, no adenopathy;  No thyromegaly, masses, or tenderness   Lungs:   Clear to auscultation bilaterally; respirations regular, even, and unlabored bilaterally   Heart:  Regular rate and rhythm, no murmurs appreciated   Abdomen:   Soft, non-tender, and non-distended   Lymph nodes: No cervical, supraclavicular, inguinal or axillary adenopathy noted   Extremities: Normal, atraumatic; no clubbing, cyanosis, or edema    Skin: No rashes, ulcers, or suspicious lesions noted     No visits with results within 2 Week(s) from this visit.   Latest known visit with results is:   Hospital Outpatient Visit on 04/09/2025   Component Date Value Ref Range Status    Free T4 04/09/2025 1.63  0.92 - 1.68 ng/dL Final    TSH 04/09/2025 1.270  0.270 - 4.200 uIU/mL Final    Glucose 04/09/2025 91  65 - 99 mg/dL Final    BUN 04/09/2025 11  8 - 23 mg/dL Final    Creatinine 04/09/2025 0.61  0.57 - 1.00 mg/dL Final    Sodium 04/09/2025 140  136 - 145 mmol/L Final    Potassium 04/09/2025 4.3  3.5 - 5.2 mmol/L Final    Chloride 04/09/2025 106  98 - 107 mmol/L Final    CO2 04/09/2025 25.6  22.0 - 29.0 mmol/L Final    Calcium 04/09/2025 10.5  8.6 - 10.5 mg/dL Final    Total Protein 04/09/2025 7.4  6.0 - 8.5 g/dL Final    Albumin 04/09/2025 4.5  3.5 - 5.2 g/dL Final    ALT (SGPT) 04/09/2025 12  1 - 33 U/L Final    AST (SGOT) 04/09/2025 14  1 - 32 U/L Final    Alkaline Phosphatase 04/09/2025 114  39 - 117 U/L Final    Total Bilirubin 04/09/2025 0.3  0.0 - 1.2 mg/dL Final    Globulin 04/09/2025 2.9  gm/dL Final    A/G Ratio 04/09/2025 1.6  g/dL Final    BUN/Creatinine Ratio 04/09/2025 18.0  7.0 - 25.0 Final    Anion Gap 04/09/2025 8.4  5.0 - 15.0 mmol/L Final    eGFR 04/09/2025 100.6  >60.0 mL/min/1.73 Final    WBC 04/09/2025 6.69  3.40 - 10.80 10*3/mm3 Final    RBC 04/09/2025 4.86  3.77 - 5.28 10*6/mm3 Final    Hemoglobin  04/09/2025 14.6  12.0 - 15.9 g/dL Final    Hematocrit 04/09/2025 43.4  34.0 - 46.6 % Final    MCV 04/09/2025 89.3  79.0 - 97.0 fL Final    MCH 04/09/2025 30.0  26.6 - 33.0 pg Final    MCHC 04/09/2025 33.6  31.5 - 35.7 g/dL Final    RDW 04/09/2025 13.4  12.3 - 15.4 % Final    RDW-SD 04/09/2025 43.6  37.0 - 54.0 fl Final    MPV 04/09/2025 9.1  6.0 - 12.0 fL Final    Platelets 04/09/2025 297  140 - 450 10*3/mm3 Final    Neutrophil % 04/09/2025 66.1  42.7 - 76.0 % Final    Lymphocyte % 04/09/2025 23.8  19.6 - 45.3 % Final    Monocyte % 04/09/2025 7.8  5.0 - 12.0 % Final    Eosinophil % 04/09/2025 1.5  0.3 - 6.2 % Final    Basophil % 04/09/2025 0.7  0.0 - 1.5 % Final    Immature Grans % 04/09/2025 0.1  0.0 - 0.5 % Final    Neutrophils, Absolute 04/09/2025 4.42  1.70 - 7.00 10*3/mm3 Final    Lymphocytes, Absolute 04/09/2025 1.59  0.70 - 3.10 10*3/mm3 Final    Monocytes, Absolute 04/09/2025 0.52  0.10 - 0.90 10*3/mm3 Final    Eosinophils, Absolute 04/09/2025 0.10  0.00 - 0.40 10*3/mm3 Final    Basophils, Absolute 04/09/2025 0.05  0.00 - 0.20 10*3/mm3 Final    Immature Grans, Absolute 04/09/2025 0.01  0.00 - 0.05 10*3/mm3 Final    nRBC 04/09/2025 0.0  0.0 - 0.2 /100 WBC Final        No results found.        ASSESSMENT: The patient is a very pleasant 64 y.o. female  with small cell lung cancer      PLAN:    1. Right lower lobe small cell lung cancer TxN2 M1b stage Mayelin:  A.  I will proceed with treatment as scheduled today maintenance immunotherapy Imfinzi cycle #13.  B.  She will follow-up with us in 4 weeks for cycle #14.  C. We reviewed potential side effects of immunotherapy including but not limited to immune mediated reactions with thyroiditis, pneumonitis, hepatitis, colitis, rash, and electrolyte abnormalities, fatigue, multiorgan failure, and possibly death.  D.  I will continue to monitor the patient blood work including blood counts kidney function liver function and electrolytes.  E.  Scan results from March 31,  2025 showed her scans are essentially stable without any evidence of new or progressive disease.  I will do 4 months follow-up scan which will be due end of July 2025.  F.  I discussed with the patient mother results from April 9 , 2025 and reassured her CBC looked normal.  CMP revealed alkaline phosphatase 1114.  I will follow-up on labs from today.    2. Lambert-Eaton syndrome:  A. She will continue Firdapse 10 mg 4 times daily as prescribed by neurology.  B.  I do think this should be a contraindication for her immunotherapy since chemotherapy with treating the underlying cause for her neurologic syndrome.  C.  She was unable to tolerate plasma exchange.  She has declined any further exchanges  D.  I recommend to keep Imuran on hold given her worsening liver enzymes.    3. Hypothyroid:  A. She will continue Synthroid daily    4.  Poor systolic cardiac function:  A.  Status post defibrillator placement.    5.  Left hip pain:  A.  She is scheduled to see Ortho next week.    6.  Chemotherapy-induced nausea:  A.  I will continue Zofran as needed.    7.  Dizziness  A.  Continue to follow-up with neurology for management.  B.  Continue meclizine 3 times a day as directed by neurology    FOLLOW UP: 4 weeks with cycle 14.    Mary Carmen Hoover, APRN  5/7/2025

## 2025-05-07 ENCOUNTER — HOSPITAL ENCOUNTER (OUTPATIENT)
Facility: HOSPITAL | Age: 65
Discharge: HOME | End: 2025-05-07
Admitting: INTERNAL MEDICINE
Payer: MEDICARE

## 2025-05-07 ENCOUNTER — OFFICE VISIT (OUTPATIENT)
Dept: ONCOLOGY | Facility: CLINIC | Age: 65
End: 2025-05-07
Payer: MEDICARE

## 2025-05-07 VITALS
BODY MASS INDEX: 23.54 KG/M2 | RESPIRATION RATE: 18 BRPM | DIASTOLIC BLOOD PRESSURE: 67 MMHG | SYSTOLIC BLOOD PRESSURE: 139 MMHG | TEMPERATURE: 98.9 F | HEART RATE: 101 BPM | WEIGHT: 146.5 LBS | OXYGEN SATURATION: 98 % | HEIGHT: 66 IN

## 2025-05-07 DIAGNOSIS — C77.2 SECONDARY MALIGNANT NEOPLASM OF INTRA-ABDOMINAL LYMPH NODES: ICD-10-CM

## 2025-05-07 DIAGNOSIS — C34.31 SMALL CELL LUNG CANCER, RIGHT LOWER LOBE: Primary | ICD-10-CM

## 2025-05-07 LAB
ALBUMIN SERPL-MCNC: 4.1 G/DL (ref 3.5–5.2)
ALBUMIN/GLOB SERPL: 1.4 G/DL
ALP SERPL-CCNC: 100 U/L (ref 39–117)
ALT SERPL W P-5'-P-CCNC: 19 U/L (ref 1–33)
ANION GAP SERPL CALCULATED.3IONS-SCNC: 10.2 MMOL/L (ref 5–15)
AST SERPL-CCNC: 20 U/L (ref 1–32)
BASOPHILS # BLD AUTO: 0.05 10*3/MM3 (ref 0–0.2)
BASOPHILS NFR BLD AUTO: 1 % (ref 0–1.5)
BILIRUB SERPL-MCNC: 0.3 MG/DL (ref 0–1.2)
BUN SERPL-MCNC: 8 MG/DL (ref 8–23)
BUN/CREAT SERPL: 11.4 (ref 7–25)
CALCIUM SPEC-SCNC: 9.9 MG/DL (ref 8.6–10.5)
CHLORIDE SERPL-SCNC: 103 MMOL/L (ref 98–107)
CO2 SERPL-SCNC: 27.8 MMOL/L (ref 22–29)
CREAT SERPL-MCNC: 0.7 MG/DL (ref 0.57–1)
DEPRECATED RDW RBC AUTO: 42.9 FL (ref 37–54)
EGFRCR SERPLBLD CKD-EPI 2021: 97.3 ML/MIN/1.73
EOSINOPHIL # BLD AUTO: 0.08 10*3/MM3 (ref 0–0.4)
EOSINOPHIL NFR BLD AUTO: 1.6 % (ref 0.3–6.2)
ERYTHROCYTE [DISTWIDTH] IN BLOOD BY AUTOMATED COUNT: 13.1 % (ref 12.3–15.4)
GLOBULIN UR ELPH-MCNC: 2.9 GM/DL
GLUCOSE SERPL-MCNC: 77 MG/DL (ref 65–99)
HCT VFR BLD AUTO: 40.8 % (ref 34–46.6)
HGB BLD-MCNC: 13.7 G/DL (ref 12–15.9)
IMM GRANULOCYTES # BLD AUTO: 0.01 10*3/MM3 (ref 0–0.05)
IMM GRANULOCYTES NFR BLD AUTO: 0.2 % (ref 0–0.5)
LYMPHOCYTES # BLD AUTO: 1.38 10*3/MM3 (ref 0.7–3.1)
LYMPHOCYTES NFR BLD AUTO: 27.5 % (ref 19.6–45.3)
MCH RBC QN AUTO: 30 PG (ref 26.6–33)
MCHC RBC AUTO-ENTMCNC: 33.6 G/DL (ref 31.5–35.7)
MCV RBC AUTO: 89.5 FL (ref 79–97)
MONOCYTES # BLD AUTO: 0.39 10*3/MM3 (ref 0.1–0.9)
MONOCYTES NFR BLD AUTO: 7.8 % (ref 5–12)
NEUTROPHILS NFR BLD AUTO: 3.1 10*3/MM3 (ref 1.7–7)
NEUTROPHILS NFR BLD AUTO: 61.9 % (ref 42.7–76)
NRBC BLD AUTO-RTO: 0 /100 WBC (ref 0–0.2)
PLATELET # BLD AUTO: 225 10*3/MM3 (ref 140–450)
PMV BLD AUTO: 9.3 FL (ref 6–12)
POTASSIUM SERPL-SCNC: 4.3 MMOL/L (ref 3.5–5.2)
PROT SERPL-MCNC: 7 G/DL (ref 6–8.5)
RBC # BLD AUTO: 4.56 10*6/MM3 (ref 3.77–5.28)
SODIUM SERPL-SCNC: 141 MMOL/L (ref 136–145)
T4 FREE SERPL-MCNC: 1.4 NG/DL (ref 0.92–1.68)
TSH SERPL DL<=0.05 MIU/L-ACNC: 1.87 UIU/ML (ref 0.27–4.2)
WBC NRBC COR # BLD AUTO: 5.01 10*3/MM3 (ref 3.4–10.8)

## 2025-05-07 PROCEDURE — 85025 COMPLETE CBC W/AUTO DIFF WBC: CPT | Performed by: INTERNAL MEDICINE

## 2025-05-07 PROCEDURE — 84443 ASSAY THYROID STIM HORMONE: CPT | Performed by: INTERNAL MEDICINE

## 2025-05-07 PROCEDURE — 84439 ASSAY OF FREE THYROXINE: CPT | Performed by: INTERNAL MEDICINE

## 2025-05-07 PROCEDURE — 25010000002 DURVALUMAB 50 MG/ML SOLUTION 10 ML VIAL: Performed by: INTERNAL MEDICINE

## 2025-05-07 PROCEDURE — 25810000003 SODIUM CHLORIDE 0.9 % SOLUTION 250 ML FLEX CONT: Performed by: INTERNAL MEDICINE

## 2025-05-07 PROCEDURE — 80053 COMPREHEN METABOLIC PANEL: CPT | Performed by: INTERNAL MEDICINE

## 2025-05-07 PROCEDURE — 96413 CHEMO IV INFUSION 1 HR: CPT

## 2025-05-07 RX ORDER — MECLIZINE HYDROCHLORIDE 25 MG/1
25 TABLET ORAL 3 TIMES DAILY PRN
COMMUNITY
Start: 2025-04-29

## 2025-05-07 RX ADMIN — SODIUM CHLORIDE 1500 MG: 9 INJECTION, SOLUTION INTRAVENOUS at 12:01

## 2025-05-07 NOTE — CODE DOCUMENTATION
PIV established on third attempt, labs obtained off site. Labs WNL. Treatment provided per tx plan (see EMAR). Pt tolerated tx w/o complications. PIV d/c'd per protocol, AVS provided. Pt ambulated out of clinic w/o any acute s/sx of distress.      Pt did pay for his own medications per nursing, waiting on meds to come to the beside. Pt is ok to dc.

## 2025-06-04 ENCOUNTER — HOSPITAL ENCOUNTER (OUTPATIENT)
Facility: HOSPITAL | Age: 65
Discharge: HOME | End: 2025-06-04
Admitting: INTERNAL MEDICINE
Payer: MEDICARE

## 2025-06-04 ENCOUNTER — OFFICE VISIT (OUTPATIENT)
Dept: ONCOLOGY | Facility: CLINIC | Age: 65
End: 2025-06-04
Payer: MEDICARE

## 2025-06-04 VITALS
RESPIRATION RATE: 16 BRPM | HEART RATE: 107 BPM | WEIGHT: 148 LBS | DIASTOLIC BLOOD PRESSURE: 70 MMHG | OXYGEN SATURATION: 92 % | TEMPERATURE: 98.2 F | BODY MASS INDEX: 23.78 KG/M2 | HEIGHT: 66 IN | SYSTOLIC BLOOD PRESSURE: 112 MMHG

## 2025-06-04 DIAGNOSIS — C34.31 SMALL CELL LUNG CANCER, RIGHT LOWER LOBE: Primary | ICD-10-CM

## 2025-06-04 DIAGNOSIS — C77.2 SECONDARY MALIGNANT NEOPLASM OF INTRA-ABDOMINAL LYMPH NODES: ICD-10-CM

## 2025-06-04 LAB
ALBUMIN SERPL-MCNC: 4.2 G/DL (ref 3.5–5.2)
ALBUMIN/GLOB SERPL: 1.6 G/DL
ALP SERPL-CCNC: 94 U/L (ref 39–117)
ALT SERPL W P-5'-P-CCNC: 13 U/L (ref 1–33)
ANION GAP SERPL CALCULATED.3IONS-SCNC: 11.5 MMOL/L (ref 5–15)
AST SERPL-CCNC: 17 U/L (ref 1–32)
BASOPHILS # BLD AUTO: 0.05 10*3/MM3 (ref 0–0.2)
BASOPHILS NFR BLD AUTO: 0.9 % (ref 0–1.5)
BILIRUB SERPL-MCNC: 0.4 MG/DL (ref 0–1.2)
BUN SERPL-MCNC: 11 MG/DL (ref 8–23)
BUN/CREAT SERPL: 14.9 (ref 7–25)
CALCIUM SPEC-SCNC: 9.8 MG/DL (ref 8.6–10.5)
CHLORIDE SERPL-SCNC: 105 MMOL/L (ref 98–107)
CO2 SERPL-SCNC: 25.5 MMOL/L (ref 22–29)
CREAT SERPL-MCNC: 0.74 MG/DL (ref 0.57–1)
DEPRECATED RDW RBC AUTO: 42.5 FL (ref 37–54)
EGFRCR SERPLBLD CKD-EPI 2021: 90.5 ML/MIN/1.73
EOSINOPHIL # BLD AUTO: 0.11 10*3/MM3 (ref 0–0.4)
EOSINOPHIL NFR BLD AUTO: 1.9 % (ref 0.3–6.2)
ERYTHROCYTE [DISTWIDTH] IN BLOOD BY AUTOMATED COUNT: 12.9 % (ref 12.3–15.4)
GLOBULIN UR ELPH-MCNC: 2.6 GM/DL
GLUCOSE SERPL-MCNC: 108 MG/DL (ref 65–99)
HCT VFR BLD AUTO: 40 % (ref 34–46.6)
HGB BLD-MCNC: 13.5 G/DL (ref 12–15.9)
IMM GRANULOCYTES # BLD AUTO: 0.02 10*3/MM3 (ref 0–0.05)
IMM GRANULOCYTES NFR BLD AUTO: 0.4 % (ref 0–0.5)
LYMPHOCYTES # BLD AUTO: 1.2 10*3/MM3 (ref 0.7–3.1)
LYMPHOCYTES NFR BLD AUTO: 21.1 % (ref 19.6–45.3)
MCH RBC QN AUTO: 30.1 PG (ref 26.6–33)
MCHC RBC AUTO-ENTMCNC: 33.8 G/DL (ref 31.5–35.7)
MCV RBC AUTO: 89.3 FL (ref 79–97)
MONOCYTES # BLD AUTO: 0.42 10*3/MM3 (ref 0.1–0.9)
MONOCYTES NFR BLD AUTO: 7.4 % (ref 5–12)
NEUTROPHILS NFR BLD AUTO: 3.9 10*3/MM3 (ref 1.7–7)
NEUTROPHILS NFR BLD AUTO: 68.3 % (ref 42.7–76)
NRBC BLD AUTO-RTO: 0 /100 WBC (ref 0–0.2)
PLATELET # BLD AUTO: 229 10*3/MM3 (ref 140–450)
PMV BLD AUTO: 9.1 FL (ref 6–12)
POTASSIUM SERPL-SCNC: 3.9 MMOL/L (ref 3.5–5.2)
PROT SERPL-MCNC: 6.8 G/DL (ref 6–8.5)
RBC # BLD AUTO: 4.48 10*6/MM3 (ref 3.77–5.28)
SODIUM SERPL-SCNC: 142 MMOL/L (ref 136–145)
T4 FREE SERPL-MCNC: 1.44 NG/DL (ref 0.92–1.68)
TSH SERPL DL<=0.05 MIU/L-ACNC: 1.5 UIU/ML (ref 0.27–4.2)
WBC NRBC COR # BLD AUTO: 5.7 10*3/MM3 (ref 3.4–10.8)

## 2025-06-04 PROCEDURE — 1126F AMNT PAIN NOTED NONE PRSNT: CPT | Performed by: INTERNAL MEDICINE

## 2025-06-04 PROCEDURE — 85025 COMPLETE CBC W/AUTO DIFF WBC: CPT | Performed by: INTERNAL MEDICINE

## 2025-06-04 PROCEDURE — 80053 COMPREHEN METABOLIC PANEL: CPT | Performed by: INTERNAL MEDICINE

## 2025-06-04 PROCEDURE — 25810000003 SODIUM CHLORIDE 0.9 % SOLUTION 250 ML FLEX CONT: Performed by: INTERNAL MEDICINE

## 2025-06-04 PROCEDURE — 99214 OFFICE O/P EST MOD 30 MIN: CPT | Performed by: INTERNAL MEDICINE

## 2025-06-04 PROCEDURE — 96413 CHEMO IV INFUSION 1 HR: CPT

## 2025-06-04 PROCEDURE — 84443 ASSAY THYROID STIM HORMONE: CPT | Performed by: INTERNAL MEDICINE

## 2025-06-04 PROCEDURE — 84439 ASSAY OF FREE THYROXINE: CPT | Performed by: INTERNAL MEDICINE

## 2025-06-04 PROCEDURE — 25010000002 DURVALUMAB 50 MG/ML SOLUTION 10 ML VIAL: Performed by: INTERNAL MEDICINE

## 2025-06-04 RX ORDER — SODIUM CHLORIDE 9 MG/ML
20 INJECTION, SOLUTION INTRAVENOUS ONCE
Status: DISCONTINUED | OUTPATIENT
Start: 2025-06-04 | End: 2025-06-05 | Stop reason: HOSPADM

## 2025-06-04 RX ORDER — SODIUM CHLORIDE 9 MG/ML
20 INJECTION, SOLUTION INTRAVENOUS ONCE
Status: CANCELLED | OUTPATIENT
Start: 2025-06-04

## 2025-06-04 RX ADMIN — SODIUM CHLORIDE 1500 MG: 9 INJECTION, SOLUTION INTRAVENOUS at 10:55

## 2025-06-04 NOTE — PROGRESS NOTES
DATE OF VISIT: 6/4/2025    REASON FOR VISIT: Followup for right lower lobe small cell lung cancer     PROBLEM LIST:  1. Right lower lobe small cell lung cancer T1 N1 M0 stage IIb:  A.  Presented with ataxia and lower extremities weakness  B.  Negative bronchoscopy August 19, 2020 and endobronchial ultrasound September 9, 2020  C.  CT chest done December 7, 2020 revealed 2.7 cm right hilar mass  D.  Whole-body PET scan done January 11, 2020 revealed isolated hypermetabolic activity at the right hilar mass SUV of 12  E. positive serology for AGN A-1 antibody which is 92% predicted for small cell lung cancer, positive N type calcium channel blocker and P/Q type calcium channel blooker.  F.  Started chest radiation February 26, 2021, completed end of March 2021  G.  Repeated PET scan done June 08, 2023 revealed complete resolution of activity in the chest however new portacaval hypermetabolic mass.  Biopsy-proven small cell cancer July 12, 2023  H.  Treated with definitive radiotherapy, completed July 2023.  I.  Progressive disease with worsening para-aortic lymphadenopathy document CT scans in February 1, 2024.  J.  Started chemotherapy with carboplatin etoposide and Imfinzi February 2024, Status post 4 cycles.  K.  Started maintenance Imfinzi May 8, 2024.  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  Hypertension  4.  Hypercholesteremia      HISTORY OF PRESENT ILLNESS: The patient is a very pleasant 65 y.o. female with past medical history significant for suspected small cell lung cancer of the right lung. The patient never had a positive biopsy however her serology was suggestive of SCLCA after presenting with Lambert-Eaton syndrome. The patient is here today for scheduled follow up visit with treatment maintenance therapy, Imfinzi cycle #14.      SUBJECTIVE: Gayatri is here today with her .  All in all she is doing well.  Denies any fever chills no night sweats.  She  continues to have dizziness.    Past History:  Medical History: has a past medical history of Arthritis, Disease of thyroid gland, Heart disease, History of radiation therapy (08/11/2023), Hyperlipidemia, Lambert-Eaton syndrome, Lung cancer (02/2021), Metastatic cancer, Pneumonia, and Requires supplemental oxygen.   Surgical History: has a past surgical history that includes Hysterectomy; Foot surgery; and Knee surgery.   Family History: family history includes Brain cancer in her father; Breast cancer in her sister; Heart disease in her brother, mother, and sister; Kidney disease in her mother.   Social History: reports that she quit smoking about 2 years ago. Her smoking use included cigarettes. She started smoking about 17 years ago. She has a 15 pack-year smoking history. She has never used smokeless tobacco. She reports that she does not drink alcohol and does not use drugs.    (Not in a hospital admission)     Allergies: Erythromycin     Review of Systems   Constitutional:  Positive for fatigue.   Respiratory: Negative.     Gastrointestinal: Negative.    Musculoskeletal:  Positive for gait problem.   Neurological:  Positive for weakness.         Current Outpatient Medications:     Amifampridine Phosphate (Firdapse) 10 MG tablet, Take 2 tablets by mouth 4 (Four) Times a Day., Disp: , Rfl:     aspirin 81 MG EC tablet, Take 1 tablet by mouth Daily. 7-6-23 last dose, Disp: , Rfl:     atorvastatin (LIPITOR) 20 MG tablet, , Disp: , Rfl:     azaTHIOprine (IMURAN) 50 MG tablet, Take 1 tablet (50 mg) by mouth 1 (one) time each day for 14 days, THEN 2 tablets (100 mg) 1 (one) time each day for 14 days, THEN 3 tablets (150 mg) 1 (one) time each day thereafter., Disp: , Rfl:     furosemide (LASIX) 40 MG tablet, Take 1 tablet by mouth Daily., Disp: , Rfl:     HYDROcodone-acetaminophen (NORCO) 5-325 MG per tablet, , Disp: , Rfl:     ipratropium-albuterol (DUO-NEB) 0.5-2.5 mg/3 ml nebulizer, , Disp: , Rfl:     levothyroxine  "(SYNTHROID, LEVOTHROID) 75 MCG tablet, Take 1 tablet by mouth Daily., Disp: , Rfl:     meclizine (ANTIVERT) 25 MG tablet, Take 1 tablet by mouth 3 (Three) Times a Day As Needed., Disp: , Rfl:     methocarbamol (ROBAXIN) 750 MG tablet, Take 1 tablet by mouth 4 (Four) Times a Day., Disp: , Rfl:     metoprolol tartrate (LOPRESSOR) 50 MG tablet, Take 0.5 tablets by mouth Daily As Needed. Patient states she takes as needed., Disp: , Rfl:     multivitamin with minerals tablet tablet, Take 1 tablet by mouth Daily., Disp: 30 each, Rfl: 0    naloxone (NARCAN) 4 MG/0.1ML nasal spray, Call 911. Don't prime. [place] in 1 nostril for overdose. Repeat in 2-3 minutes in other nostril if no or minimal breathing/responsiveness., Disp: 2 each, Rfl: 0    oxyCODONE-acetaminophen (PERCOCET) 5-325 MG per tablet, Take 1 tablet by mouth Every 6 (Six) Hours As Needed for Moderate Pain or Severe Pain., Disp: 12 tablet, Rfl: 0    potassium chloride (K-DUR,KLOR-CON) 20 MEQ CR tablet, Take 1 tablet by mouth Daily., Disp: , Rfl:     potassium chloride ER (K-TAB) 20 MEQ tablet controlled-release ER tablet, Take 1 tablet by mouth Daily., Disp: , Rfl:     spironolactone (ALDACTONE) 25 MG tablet, Take 1 tablet by mouth Daily., Disp: , Rfl:     PHYSICAL EXAMINATION:   /70   Pulse 107   Temp 98.2 °F (36.8 °C) (Temporal)   Resp 16   Ht 167.6 cm (65.98\")   Wt 67.1 kg (148 lb)   SpO2 92%   BMI 23.90 kg/m²    Pain Score    06/04/25 0824   PainSc: 0-No pain                    ECOG Performance Status: 2 - Symptomatic, <50% confined to bed  General Appearance:  alert, cooperative, no apparent distress and appears stated age   Neurologic/Psychiatric: A&O x 3, gait steady, appropriate affect, strength 5/5 in all muscle groups   HEENT:  Normocephalic, without obvious abnormality, mucous membranes moist   Neck: Supple, symmetrical, trachea midline, no adenopathy;  No thyromegaly, masses, or tenderness   Lungs:   Clear to auscultation bilaterally; " respirations regular, even, and unlabored bilaterally   Heart:  Regular rate and rhythm, no murmurs appreciated   Abdomen:   Soft, non-tender, and non-distended   Lymph nodes: No cervical, supraclavicular, inguinal or axillary adenopathy noted   Extremities: Normal, atraumatic; no clubbing, cyanosis, or edema    Skin: No rashes, ulcers, or suspicious lesions noted     No visits with results within 2 Week(s) from this visit.   Latest known visit with results is:   Hospital Outpatient Visit on 05/07/2025   Component Date Value Ref Range Status    Free T4 05/07/2025 1.40  0.92 - 1.68 ng/dL Final    TSH 05/07/2025 1.870  0.270 - 4.200 uIU/mL Final    Glucose 05/07/2025 77  65 - 99 mg/dL Final    BUN 05/07/2025 8  8 - 23 mg/dL Final    Creatinine 05/07/2025 0.70  0.57 - 1.00 mg/dL Final    Sodium 05/07/2025 141  136 - 145 mmol/L Final    Potassium 05/07/2025 4.3  3.5 - 5.2 mmol/L Final    Chloride 05/07/2025 103  98 - 107 mmol/L Final    CO2 05/07/2025 27.8  22.0 - 29.0 mmol/L Final    Calcium 05/07/2025 9.9  8.6 - 10.5 mg/dL Final    Total Protein 05/07/2025 7.0  6.0 - 8.5 g/dL Final    Albumin 05/07/2025 4.1  3.5 - 5.2 g/dL Final    ALT (SGPT) 05/07/2025 19  1 - 33 U/L Final    AST (SGOT) 05/07/2025 20  1 - 32 U/L Final    Alkaline Phosphatase 05/07/2025 100  39 - 117 U/L Final    Total Bilirubin 05/07/2025 0.3  0.0 - 1.2 mg/dL Final    Globulin 05/07/2025 2.9  gm/dL Final    A/G Ratio 05/07/2025 1.4  g/dL Final    BUN/Creatinine Ratio 05/07/2025 11.4  7.0 - 25.0 Final    Anion Gap 05/07/2025 10.2  5.0 - 15.0 mmol/L Final    eGFR 05/07/2025 97.3  >60.0 mL/min/1.73 Final    WBC 05/07/2025 5.01  3.40 - 10.80 10*3/mm3 Final    RBC 05/07/2025 4.56  3.77 - 5.28 10*6/mm3 Final    Hemoglobin 05/07/2025 13.7  12.0 - 15.9 g/dL Final    Hematocrit 05/07/2025 40.8  34.0 - 46.6 % Final    MCV 05/07/2025 89.5  79.0 - 97.0 fL Final    MCH 05/07/2025 30.0  26.6 - 33.0 pg Final    MCHC 05/07/2025 33.6  31.5 - 35.7 g/dL Final    RDW  05/07/2025 13.1  12.3 - 15.4 % Final    RDW-SD 05/07/2025 42.9  37.0 - 54.0 fl Final    MPV 05/07/2025 9.3  6.0 - 12.0 fL Final    Platelets 05/07/2025 225  140 - 450 10*3/mm3 Final    Neutrophil % 05/07/2025 61.9  42.7 - 76.0 % Final    Lymphocyte % 05/07/2025 27.5  19.6 - 45.3 % Final    Monocyte % 05/07/2025 7.8  5.0 - 12.0 % Final    Eosinophil % 05/07/2025 1.6  0.3 - 6.2 % Final    Basophil % 05/07/2025 1.0  0.0 - 1.5 % Final    Immature Grans % 05/07/2025 0.2  0.0 - 0.5 % Final    Neutrophils, Absolute 05/07/2025 3.10  1.70 - 7.00 10*3/mm3 Final    Lymphocytes, Absolute 05/07/2025 1.38  0.70 - 3.10 10*3/mm3 Final    Monocytes, Absolute 05/07/2025 0.39  0.10 - 0.90 10*3/mm3 Final    Eosinophils, Absolute 05/07/2025 0.08  0.00 - 0.40 10*3/mm3 Final    Basophils, Absolute 05/07/2025 0.05  0.00 - 0.20 10*3/mm3 Final    Immature Grans, Absolute 05/07/2025 0.01  0.00 - 0.05 10*3/mm3 Final    nRBC 05/07/2025 0.0  0.0 - 0.2 /100 WBC Final        No results found.        ASSESSMENT: The patient is a very pleasant 65 y.o. female  with small cell lung cancer      PLAN:    1. Right lower lobe small cell lung cancer TxN2 M1b stage Mayelin:  A.  I will proceed with treatment as scheduled today maintenance immunotherapy Imfinzi cycle #14.  B.  She will follow-up with us in 4 weeks for cycle #15.  C. We reviewed potential side effects of immunotherapy including but not limited to immune mediated reactions with thyroiditis, pneumonitis, hepatitis, colitis, rash, and electrolyte abnormalities, fatigue, multiorgan failure, and possibly death.  D.  I will continue to monitor the patient blood work including blood counts kidney function liver function and electrolytes.  E. I will do 4 months follow-up scan which will be due end of July 2025.  F.  I discussed with the patient blood results from 8/7/2025 CBC and CMP are normal.    2. Lambert-Eaton syndrome:  A. She will continue Firdapse 10 mg 4 times daily as prescribed by  neurology.    3. Hypothyroid:  A. She will continue Synthroid daily    4.  Poor systolic cardiac function:  A.  Status post defibrillator placement.    5.  Left hip pain:  A.  She is scheduled to see Ortho next week.    6.  Chemotherapy-induced nausea:  A.  I will continue Zofran as needed.    7.  Dizziness  A.  Continue to follow-up with neurology for management.  B.  Continue meclizine 3 times a day as directed by neurology    FOLLOW UP: 4 weeks with cycle 15.    Susi Irving MD  6/4/2025

## 2025-06-04 NOTE — ADDENDUM NOTE
Encounter addended by: Lonny Pratt, RN on: 6/4/2025 1:29 PM   Actions taken: Order Reconciliation Section accessed, Medication List reviewed, Home Medications modified, Allergies reviewed, LDA properties accepted, Flowsheet accepted

## 2025-06-17 ENCOUNTER — TELEPHONE (OUTPATIENT)
Dept: NUTRITION | Facility: HOSPITAL | Age: 65
End: 2025-06-17
Payer: MEDICARE

## 2025-06-17 NOTE — PROGRESS NOTES
Outpatient Oncology Nutrition Follow-up      Patient Name: Gayatri Kahn  YOB: 1960  MRN: [id number]      Follow-up Date:  06/17/25     Comments:      Spoke with patient's daughter for nutrition follow-up regarding oncology treatment. Denies any weight loss or appetite changes at this time. Denies any nutrition-related questions/concerns at this time. RD to follow-up in one month and available PRN.                 Electronically signed by:   Marni Nails RD  06/17/25 15:29 EDT

## 2025-07-02 ENCOUNTER — OFFICE VISIT (OUTPATIENT)
Dept: ONCOLOGY | Facility: CLINIC | Age: 65
End: 2025-07-02
Payer: MEDICARE

## 2025-07-02 ENCOUNTER — HOSPITAL ENCOUNTER (OUTPATIENT)
Facility: HOSPITAL | Age: 65
Discharge: HOME | End: 2025-07-02
Admitting: INTERNAL MEDICINE
Payer: MEDICARE

## 2025-07-02 VITALS
DIASTOLIC BLOOD PRESSURE: 83 MMHG | OXYGEN SATURATION: 91 % | SYSTOLIC BLOOD PRESSURE: 122 MMHG | HEIGHT: 66 IN | HEART RATE: 111 BPM | RESPIRATION RATE: 16 BRPM | WEIGHT: 147 LBS | TEMPERATURE: 97.5 F | BODY MASS INDEX: 23.63 KG/M2

## 2025-07-02 DIAGNOSIS — C77.2 SECONDARY MALIGNANT NEOPLASM OF INTRA-ABDOMINAL LYMPH NODES: Primary | ICD-10-CM

## 2025-07-02 DIAGNOSIS — C34.31 SMALL CELL LUNG CANCER, RIGHT LOWER LOBE: Primary | ICD-10-CM

## 2025-07-02 DIAGNOSIS — C34.31 SMALL CELL LUNG CANCER, RIGHT LOWER LOBE: ICD-10-CM

## 2025-07-02 LAB
ALBUMIN SERPL-MCNC: 4.1 G/DL (ref 3.5–5.2)
ALBUMIN/GLOB SERPL: 1.5 G/DL
ALP SERPL-CCNC: 113 U/L (ref 39–117)
ALT SERPL W P-5'-P-CCNC: 18 U/L (ref 1–33)
ANION GAP SERPL CALCULATED.3IONS-SCNC: 10.5 MMOL/L (ref 5–15)
AST SERPL-CCNC: 21 U/L (ref 1–32)
BASOPHILS # BLD AUTO: 0.04 10*3/MM3 (ref 0–0.2)
BASOPHILS NFR BLD AUTO: 0.7 % (ref 0–1.5)
BILIRUB SERPL-MCNC: 0.3 MG/DL (ref 0–1.2)
BUN SERPL-MCNC: 10 MG/DL (ref 8–23)
BUN/CREAT SERPL: 12.5 (ref 7–25)
CALCIUM SPEC-SCNC: 9.7 MG/DL (ref 8.6–10.5)
CHLORIDE SERPL-SCNC: 105 MMOL/L (ref 98–107)
CO2 SERPL-SCNC: 24.5 MMOL/L (ref 22–29)
CREAT SERPL-MCNC: 0.8 MG/DL (ref 0.57–1)
DEPRECATED RDW RBC AUTO: 41.3 FL (ref 37–54)
EGFRCR SERPLBLD CKD-EPI 2021: 82.4 ML/MIN/1.73
EOSINOPHIL # BLD AUTO: 0.12 10*3/MM3 (ref 0–0.4)
EOSINOPHIL NFR BLD AUTO: 2.2 % (ref 0.3–6.2)
ERYTHROCYTE [DISTWIDTH] IN BLOOD BY AUTOMATED COUNT: 12.6 % (ref 12.3–15.4)
GLOBULIN UR ELPH-MCNC: 2.7 GM/DL
GLUCOSE SERPL-MCNC: 136 MG/DL (ref 65–99)
HCT VFR BLD AUTO: 39.2 % (ref 34–46.6)
HGB BLD-MCNC: 13.2 G/DL (ref 12–15.9)
IMM GRANULOCYTES # BLD AUTO: 0.01 10*3/MM3 (ref 0–0.05)
IMM GRANULOCYTES NFR BLD AUTO: 0.2 % (ref 0–0.5)
LYMPHOCYTES # BLD AUTO: 1.18 10*3/MM3 (ref 0.7–3.1)
LYMPHOCYTES NFR BLD AUTO: 21.4 % (ref 19.6–45.3)
MCH RBC QN AUTO: 30 PG (ref 26.6–33)
MCHC RBC AUTO-ENTMCNC: 33.7 G/DL (ref 31.5–35.7)
MCV RBC AUTO: 89.1 FL (ref 79–97)
MONOCYTES # BLD AUTO: 0.42 10*3/MM3 (ref 0.1–0.9)
MONOCYTES NFR BLD AUTO: 7.6 % (ref 5–12)
NEUTROPHILS NFR BLD AUTO: 3.74 10*3/MM3 (ref 1.7–7)
NEUTROPHILS NFR BLD AUTO: 67.9 % (ref 42.7–76)
NRBC BLD AUTO-RTO: 0 /100 WBC (ref 0–0.2)
PLATELET # BLD AUTO: 239 10*3/MM3 (ref 140–450)
PMV BLD AUTO: 9.2 FL (ref 6–12)
POTASSIUM SERPL-SCNC: 4 MMOL/L (ref 3.5–5.2)
PROT SERPL-MCNC: 6.8 G/DL (ref 6–8.5)
RBC # BLD AUTO: 4.4 10*6/MM3 (ref 3.77–5.28)
SODIUM SERPL-SCNC: 140 MMOL/L (ref 136–145)
T4 FREE SERPL-MCNC: 1.59 NG/DL (ref 0.92–1.68)
TSH SERPL DL<=0.05 MIU/L-ACNC: 1.97 UIU/ML (ref 0.27–4.2)
WBC NRBC COR # BLD AUTO: 5.51 10*3/MM3 (ref 3.4–10.8)

## 2025-07-02 PROCEDURE — 25810000003 SODIUM CHLORIDE 0.9 % SOLUTION 250 ML FLEX CONT: Performed by: INTERNAL MEDICINE

## 2025-07-02 PROCEDURE — 84439 ASSAY OF FREE THYROXINE: CPT | Performed by: INTERNAL MEDICINE

## 2025-07-02 PROCEDURE — 1126F AMNT PAIN NOTED NONE PRSNT: CPT | Performed by: INTERNAL MEDICINE

## 2025-07-02 PROCEDURE — 25010000002 DURVALUMAB 50 MG/ML SOLUTION 10 ML VIAL: Performed by: INTERNAL MEDICINE

## 2025-07-02 PROCEDURE — 85025 COMPLETE CBC W/AUTO DIFF WBC: CPT | Performed by: INTERNAL MEDICINE

## 2025-07-02 PROCEDURE — 96413 CHEMO IV INFUSION 1 HR: CPT

## 2025-07-02 PROCEDURE — 99214 OFFICE O/P EST MOD 30 MIN: CPT | Performed by: INTERNAL MEDICINE

## 2025-07-02 PROCEDURE — 80053 COMPREHEN METABOLIC PANEL: CPT | Performed by: INTERNAL MEDICINE

## 2025-07-02 PROCEDURE — 84443 ASSAY THYROID STIM HORMONE: CPT | Performed by: INTERNAL MEDICINE

## 2025-07-02 RX ORDER — SODIUM CHLORIDE 9 MG/ML
20 INJECTION, SOLUTION INTRAVENOUS ONCE
Status: CANCELLED | OUTPATIENT
Start: 2025-07-02

## 2025-07-02 RX ORDER — SODIUM CHLORIDE 9 MG/ML
20 INJECTION, SOLUTION INTRAVENOUS ONCE
Status: DISCONTINUED | OUTPATIENT
Start: 2025-07-02 | End: 2025-07-03 | Stop reason: HOSPADM

## 2025-07-02 RX ADMIN — SODIUM CHLORIDE 1500 MG: 9 INJECTION, SOLUTION INTRAVENOUS at 11:05

## 2025-07-02 NOTE — PROGRESS NOTES
DATE OF VISIT: 7/2/2025    REASON FOR VISIT: Followup for right lower lobe small cell lung cancer     PROBLEM LIST:  1. Right lower lobe small cell lung cancer T1 N1 M0 stage IIb:  A.  Presented with ataxia and lower extremities weakness  B.  Negative bronchoscopy August 19, 2020 and endobronchial ultrasound September 9, 2020  C.  CT chest done December 7, 2020 revealed 2.7 cm right hilar mass  D.  Whole-body PET scan done January 11, 2020 revealed isolated hypermetabolic activity at the right hilar mass SUV of 12  E. positive serology for AGN A-1 antibody which is 92% predicted for small cell lung cancer, positive N type calcium channel blocker and P/Q type calcium channel blooker.  F.  Started chest radiation February 26, 2021, completed end of March 2021  G.  Repeated PET scan done June 08, 2023 revealed complete resolution of activity in the chest however new portacaval hypermetabolic mass.  Biopsy-proven small cell cancer July 12, 2023  H.  Treated with definitive radiotherapy, completed July 2023.  I.  Progressive disease with worsening para-aortic lymphadenopathy document CT scans in February 1, 2024.  J.  Started chemotherapy with carboplatin etoposide and Imfinzi February 2024, Status post 4 cycles.  K.  Started maintenance Imfinzi May 8, 2024.  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  Hypertension  4.  Hypercholesteremia      HISTORY OF PRESENT ILLNESS: The patient is a very pleasant 65 y.o. female with past medical history significant for suspected small cell lung cancer of the right lung. The patient never had a positive biopsy however her serology was suggestive of SCLCA after presenting with Lambert-Eaton syndrome. The patient is here today for scheduled follow up visit with treatment maintenance therapy, Imfinzi cycle #15.      SUBJECTIVE: Gayatri is here today with her .  She is complaining of dizziness.    Past History:  Medical History: has a past  medical history of Arthritis, Disease of thyroid gland, Heart disease, History of radiation therapy (08/11/2023), Hyperlipidemia, Lambert-Eaton syndrome, Lung cancer (02/2021), Metastatic cancer, Pneumonia, and Requires supplemental oxygen.   Surgical History: has a past surgical history that includes Hysterectomy; Foot surgery; and Knee surgery.   Family History: family history includes Brain cancer in her father; Breast cancer in her sister; Heart disease in her brother, mother, and sister; Kidney disease in her mother.   Social History: reports that she quit smoking about 2 years ago. Her smoking use included cigarettes. She started smoking about 17 years ago. She has a 15 pack-year smoking history. She has never used smokeless tobacco. She reports that she does not drink alcohol and does not use drugs.    (Not in a hospital admission)     Allergies: Erythromycin     Review of Systems   Constitutional:  Positive for fatigue.   Respiratory: Negative.     Gastrointestinal: Negative.    Musculoskeletal:  Positive for gait problem.   Neurological:  Positive for weakness.         Current Outpatient Medications:     Amifampridine Phosphate (Firdapse) 10 MG tablet, Take 2 tablets by mouth 4 (Four) Times a Day., Disp: , Rfl:     aspirin 81 MG EC tablet, Take 1 tablet by mouth Daily. 7-6-23 last dose, Disp: , Rfl:     atorvastatin (LIPITOR) 20 MG tablet, , Disp: , Rfl:     azaTHIOprine (IMURAN) 50 MG tablet, Take 1 tablet (50 mg) by mouth 1 (one) time each day for 14 days, THEN 2 tablets (100 mg) 1 (one) time each day for 14 days, THEN 3 tablets (150 mg) 1 (one) time each day thereafter., Disp: , Rfl:     furosemide (LASIX) 40 MG tablet, Take 1 tablet by mouth Daily., Disp: , Rfl:     HYDROcodone-acetaminophen (NORCO) 5-325 MG per tablet, , Disp: , Rfl:     ipratropium-albuterol (DUO-NEB) 0.5-2.5 mg/3 ml nebulizer, , Disp: , Rfl:     levothyroxine (SYNTHROID, LEVOTHROID) 75 MCG tablet, Take 1 tablet by mouth Daily., Disp:  ", Rfl:     meclizine (ANTIVERT) 25 MG tablet, Take 1 tablet by mouth 3 (Three) Times a Day As Needed., Disp: , Rfl:     methocarbamol (ROBAXIN) 750 MG tablet, Take 1 tablet by mouth 4 (Four) Times a Day., Disp: , Rfl:     metoprolol tartrate (LOPRESSOR) 50 MG tablet, Take 0.5 tablets by mouth Daily As Needed. Patient states she takes as needed., Disp: , Rfl:     multivitamin with minerals tablet tablet, Take 1 tablet by mouth Daily., Disp: 30 each, Rfl: 0    naloxone (NARCAN) 4 MG/0.1ML nasal spray, Call 911. Don't prime. [place] in 1 nostril for overdose. Repeat in 2-3 minutes in other nostril if no or minimal breathing/responsiveness., Disp: 2 each, Rfl: 0    oxyCODONE-acetaminophen (PERCOCET) 5-325 MG per tablet, Take 1 tablet by mouth Every 6 (Six) Hours As Needed for Moderate Pain or Severe Pain., Disp: 12 tablet, Rfl: 0    potassium chloride (K-DUR,KLOR-CON) 20 MEQ CR tablet, Take 1 tablet by mouth Daily., Disp: , Rfl:     potassium chloride ER (K-TAB) 20 MEQ tablet controlled-release ER tablet, Take 1 tablet by mouth Daily., Disp: , Rfl:     spironolactone (ALDACTONE) 25 MG tablet, Take 1 tablet by mouth Daily., Disp: , Rfl:     PHYSICAL EXAMINATION:   /83   Pulse 111   Temp 97.5 °F (36.4 °C) (Infrared)   Resp 16   Ht 167.6 cm (65.98\")   Wt 66.7 kg (147 lb)   SpO2 91%   BMI 23.74 kg/m²    Pain Score    07/02/25 0827   PainSc: 0-No pain                      ECOG Performance Status: 2 - Symptomatic, <50% confined to bed  General Appearance:  alert, cooperative, no apparent distress and appears stated age   Neurologic/Psychiatric: A&O x 3, gait steady, appropriate affect, strength 5/5 in all muscle groups   HEENT:  Normocephalic, without obvious abnormality, mucous membranes moist   Neck: Supple, symmetrical, trachea midline, no adenopathy;  No thyromegaly, masses, or tenderness   Lungs:   Clear to auscultation bilaterally; respirations regular, even, and unlabored bilaterally   Heart:  Regular rate " and rhythm, no murmurs appreciated   Abdomen:   Soft, non-tender, and non-distended   Lymph nodes: No cervical, supraclavicular, inguinal or axillary adenopathy noted   Extremities: Normal, atraumatic; no clubbing, cyanosis, or edema    Skin: No rashes, ulcers, or suspicious lesions noted     No visits with results within 2 Week(s) from this visit.   Latest known visit with results is:   Hospital Outpatient Visit on 06/04/2025   Component Date Value Ref Range Status    Glucose 06/04/2025 108 (H)  65 - 99 mg/dL Final    BUN 06/04/2025 11.0  8.0 - 23.0 mg/dL Final    Creatinine 06/04/2025 0.74  0.57 - 1.00 mg/dL Final    Sodium 06/04/2025 142  136 - 145 mmol/L Final    Potassium 06/04/2025 3.9  3.5 - 5.2 mmol/L Final    Chloride 06/04/2025 105  98 - 107 mmol/L Final    CO2 06/04/2025 25.5  22.0 - 29.0 mmol/L Final    Calcium 06/04/2025 9.8  8.6 - 10.5 mg/dL Final    Total Protein 06/04/2025 6.8  6.0 - 8.5 g/dL Final    Albumin 06/04/2025 4.2  3.5 - 5.2 g/dL Final    ALT (SGPT) 06/04/2025 13  1 - 33 U/L Final    AST (SGOT) 06/04/2025 17  1 - 32 U/L Final    Alkaline Phosphatase 06/04/2025 94  39 - 117 U/L Final    Total Bilirubin 06/04/2025 0.4  0.0 - 1.2 mg/dL Final    Globulin 06/04/2025 2.6  gm/dL Final    A/G Ratio 06/04/2025 1.6  g/dL Final    BUN/Creatinine Ratio 06/04/2025 14.9  7.0 - 25.0 Final    Anion Gap 06/04/2025 11.5  5.0 - 15.0 mmol/L Final    eGFR 06/04/2025 90.5  >60.0 mL/min/1.73 Final    TSH 06/04/2025 1.500  0.270 - 4.200 uIU/mL Final    Free T4 06/04/2025 1.44  0.92 - 1.68 ng/dL Final    WBC 06/04/2025 5.70  3.40 - 10.80 10*3/mm3 Final    RBC 06/04/2025 4.48  3.77 - 5.28 10*6/mm3 Final    Hemoglobin 06/04/2025 13.5  12.0 - 15.9 g/dL Final    Hematocrit 06/04/2025 40.0  34.0 - 46.6 % Final    MCV 06/04/2025 89.3  79.0 - 97.0 fL Final    MCH 06/04/2025 30.1  26.6 - 33.0 pg Final    MCHC 06/04/2025 33.8  31.5 - 35.7 g/dL Final    RDW 06/04/2025 12.9  12.3 - 15.4 % Final    RDW-SD 06/04/2025 42.5  37.0  - 54.0 fl Final    MPV 06/04/2025 9.1  6.0 - 12.0 fL Final    Platelets 06/04/2025 229  140 - 450 10*3/mm3 Final    Neutrophil % 06/04/2025 68.3  42.7 - 76.0 % Final    Lymphocyte % 06/04/2025 21.1  19.6 - 45.3 % Final    Monocyte % 06/04/2025 7.4  5.0 - 12.0 % Final    Eosinophil % 06/04/2025 1.9  0.3 - 6.2 % Final    Basophil % 06/04/2025 0.9  0.0 - 1.5 % Final    Immature Grans % 06/04/2025 0.4  0.0 - 0.5 % Final    Neutrophils, Absolute 06/04/2025 3.90  1.70 - 7.00 10*3/mm3 Final    Lymphocytes, Absolute 06/04/2025 1.20  0.70 - 3.10 10*3/mm3 Final    Monocytes, Absolute 06/04/2025 0.42  0.10 - 0.90 10*3/mm3 Final    Eosinophils, Absolute 06/04/2025 0.11  0.00 - 0.40 10*3/mm3 Final    Basophils, Absolute 06/04/2025 0.05  0.00 - 0.20 10*3/mm3 Final    Immature Grans, Absolute 06/04/2025 0.02  0.00 - 0.05 10*3/mm3 Final    nRBC 06/04/2025 0.0  0.0 - 0.2 /100 WBC Final        No results found.        ASSESSMENT: The patient is a very pleasant 65 y.o. female  with small cell lung cancer      PLAN:    1. Right lower lobe small cell lung cancer TxN2 M1b stage Mayelin:  A.  I will proceed with treatment as scheduled today maintenance immunotherapy Imfinzi cycle #15.  B.  She will follow-up with us in 4 weeks for cycle #16.  C. We reviewed potential side effects of immunotherapy including but not limited to immune mediated reactions with thyroiditis, pneumonitis, hepatitis, colitis, rash, and electrolyte abnormalities, fatigue, multiorgan failure, and possibly death.  D.  I will continue to monitor the patient blood work including blood counts kidney function liver function and electrolytes.  E. I will do 4 months follow-up scan which will be due end of July 2025.  This will be ordered for prior to return.  F.  I discussed with the patient about the result from June 4, 2025 revealed normal CBC and essentially normal CMP.    2. Lambert-Eaton syndrome:  A. She will continue Firdapse 10 mg 4 times daily as prescribed by  neurology.    3. Hypothyroid:  A. She will continue Synthroid daily    4.  Poor systolic cardiac function:  A.  Status post defibrillator placement.    5.  Left hip pain:  A.  She is scheduled to see Ortho next week.    6.  Chemotherapy-induced nausea:  A.  I will continue Zofran as needed.    7.  Dizziness  A.  Continue to follow-up with neurology for management.  B.  Continue meclizine 3 times a day as directed by neurology    FOLLOW UP: 4 weeks with cycle 15 and CT scans.    Susi Irving MD  7/2/2025

## 2025-07-02 NOTE — ADDENDUM NOTE
Encounter addended by: Esteban Alejandro RN on: 7/2/2025 12:24 PM   Actions taken: LDA properties accepted

## 2025-07-15 ENCOUNTER — TELEPHONE (OUTPATIENT)
Dept: NUTRITION | Facility: HOSPITAL | Age: 65
End: 2025-07-15
Payer: MEDICARE

## 2025-07-15 NOTE — PROGRESS NOTES
Outpatient Oncology Nutrition Follow-up      Patient Name: Gayatri Kahn  YOB: 1960  MRN: [id number]      Follow-up Date:  07/15/25     Comments:      Called patient for nutrition follow-up regarding oncology treatment. No answer and left voicemail. Will follow-up in one month.     Electronically signed by:   Marni Nails RD  07/15/25 14:12 EDT

## 2025-07-29 ENCOUNTER — HOSPITAL ENCOUNTER (OUTPATIENT)
Dept: CT IMAGING | Facility: HOSPITAL | Age: 65
Discharge: HOME | End: 2025-07-29
Payer: MEDICARE

## 2025-07-29 ENCOUNTER — HOSPITAL ENCOUNTER (OUTPATIENT)
Dept: MRI IMAGING | Facility: HOSPITAL | Age: 65
Discharge: HOME | End: 2025-07-29
Payer: MEDICARE

## 2025-07-29 DIAGNOSIS — C34.31 SMALL CELL LUNG CANCER, RIGHT LOWER LOBE: ICD-10-CM

## 2025-07-29 PROCEDURE — 25510000001 GADOPICLENOL 0.5 MMOL/ML SOLUTION: Performed by: INTERNAL MEDICINE

## 2025-07-29 PROCEDURE — 71260 CT THORAX DX C+: CPT

## 2025-07-29 PROCEDURE — 25510000001 IOPAMIDOL 61 % SOLUTION: Performed by: INTERNAL MEDICINE

## 2025-07-29 PROCEDURE — 70491 CT SOFT TISSUE NECK W/DYE: CPT

## 2025-07-29 PROCEDURE — 70553 MRI BRAIN STEM W/O & W/DYE: CPT

## 2025-07-29 PROCEDURE — 74177 CT ABD & PELVIS W/CONTRAST: CPT

## 2025-07-29 PROCEDURE — A9573 GADOPICLENOL 0.5 MMOL/ML SOLUTION: HCPCS | Performed by: INTERNAL MEDICINE

## 2025-07-29 RX ORDER — IOPAMIDOL 612 MG/ML
100 INJECTION, SOLUTION INTRAVASCULAR
Status: COMPLETED | OUTPATIENT
Start: 2025-07-29 | End: 2025-07-29

## 2025-07-29 RX ADMIN — IOPAMIDOL 100 ML: 612 INJECTION, SOLUTION INTRAVENOUS at 16:36

## 2025-07-29 RX ADMIN — GADOPICLENOL 6 ML: 485.1 INJECTION INTRAVENOUS at 16:53

## 2025-07-30 ENCOUNTER — HOSPITAL ENCOUNTER (OUTPATIENT)
Facility: HOSPITAL | Age: 65
Discharge: HOME | End: 2025-07-30
Admitting: INTERNAL MEDICINE
Payer: MEDICARE

## 2025-07-30 ENCOUNTER — OFFICE VISIT (OUTPATIENT)
Dept: ONCOLOGY | Facility: CLINIC | Age: 65
End: 2025-07-30
Payer: MEDICARE

## 2025-07-30 VITALS
SYSTOLIC BLOOD PRESSURE: 98 MMHG | DIASTOLIC BLOOD PRESSURE: 64 MMHG | HEIGHT: 66 IN | TEMPERATURE: 96.9 F | BODY MASS INDEX: 23.63 KG/M2 | RESPIRATION RATE: 16 BRPM | WEIGHT: 147 LBS | OXYGEN SATURATION: 94 % | HEART RATE: 75 BPM

## 2025-07-30 DIAGNOSIS — C34.31 SMALL CELL LUNG CANCER, RIGHT LOWER LOBE: Primary | ICD-10-CM

## 2025-07-30 DIAGNOSIS — C77.2 SECONDARY MALIGNANT NEOPLASM OF INTRA-ABDOMINAL LYMPH NODES: ICD-10-CM

## 2025-07-30 LAB
ALBUMIN SERPL-MCNC: 4.2 G/DL (ref 3.5–5.2)
ALBUMIN/GLOB SERPL: 1.4 G/DL
ALP SERPL-CCNC: 109 U/L (ref 39–117)
ALT SERPL W P-5'-P-CCNC: 15 U/L (ref 1–33)
ANION GAP SERPL CALCULATED.3IONS-SCNC: 12.3 MMOL/L (ref 5–15)
AST SERPL-CCNC: 21 U/L (ref 1–32)
BASOPHILS # BLD AUTO: 0.04 10*3/MM3 (ref 0–0.2)
BASOPHILS NFR BLD AUTO: 0.7 % (ref 0–1.5)
BILIRUB SERPL-MCNC: 0.4 MG/DL (ref 0–1.2)
BUN SERPL-MCNC: 10 MG/DL (ref 8–23)
BUN/CREAT SERPL: 11.1 (ref 7–25)
CALCIUM SPEC-SCNC: 10.1 MG/DL (ref 8.6–10.5)
CHLORIDE SERPL-SCNC: 96 MMOL/L (ref 98–107)
CO2 SERPL-SCNC: 23.7 MMOL/L (ref 22–29)
CREAT SERPL-MCNC: 0.9 MG/DL (ref 0.57–1)
DEPRECATED RDW RBC AUTO: 39.3 FL (ref 37–54)
EGFRCR SERPLBLD CKD-EPI 2021: 71.5 ML/MIN/1.73
EOSINOPHIL # BLD AUTO: 0.02 10*3/MM3 (ref 0–0.4)
EOSINOPHIL NFR BLD AUTO: 0.4 % (ref 0.3–6.2)
ERYTHROCYTE [DISTWIDTH] IN BLOOD BY AUTOMATED COUNT: 12.2 % (ref 12.3–15.4)
GLOBULIN UR ELPH-MCNC: 3.1 GM/DL
GLUCOSE SERPL-MCNC: 118 MG/DL (ref 65–99)
HCT VFR BLD AUTO: 38.4 % (ref 34–46.6)
HGB BLD-MCNC: 13 G/DL (ref 12–15.9)
IMM GRANULOCYTES # BLD AUTO: 0.01 10*3/MM3 (ref 0–0.05)
IMM GRANULOCYTES NFR BLD AUTO: 0.2 % (ref 0–0.5)
LYMPHOCYTES # BLD AUTO: 0.89 10*3/MM3 (ref 0.7–3.1)
LYMPHOCYTES NFR BLD AUTO: 16.4 % (ref 19.6–45.3)
MCH RBC QN AUTO: 29.5 PG (ref 26.6–33)
MCHC RBC AUTO-ENTMCNC: 33.9 G/DL (ref 31.5–35.7)
MCV RBC AUTO: 87.1 FL (ref 79–97)
MONOCYTES # BLD AUTO: 0.42 10*3/MM3 (ref 0.1–0.9)
MONOCYTES NFR BLD AUTO: 7.7 % (ref 5–12)
NEUTROPHILS NFR BLD AUTO: 4.04 10*3/MM3 (ref 1.7–7)
NEUTROPHILS NFR BLD AUTO: 74.6 % (ref 42.7–76)
NRBC BLD AUTO-RTO: 0 /100 WBC (ref 0–0.2)
PLATELET # BLD AUTO: 255 10*3/MM3 (ref 140–450)
PMV BLD AUTO: 9.2 FL (ref 6–12)
POTASSIUM SERPL-SCNC: 4.2 MMOL/L (ref 3.5–5.2)
PROT SERPL-MCNC: 7.3 G/DL (ref 6–8.5)
RBC # BLD AUTO: 4.41 10*6/MM3 (ref 3.77–5.28)
SODIUM SERPL-SCNC: 132 MMOL/L (ref 136–145)
T4 FREE SERPL-MCNC: 1.92 NG/DL (ref 0.92–1.68)
TSH SERPL DL<=0.05 MIU/L-ACNC: 0.69 UIU/ML (ref 0.27–4.2)
WBC NRBC COR # BLD AUTO: 5.42 10*3/MM3 (ref 3.4–10.8)

## 2025-07-30 PROCEDURE — 99214 OFFICE O/P EST MOD 30 MIN: CPT | Performed by: INTERNAL MEDICINE

## 2025-07-30 PROCEDURE — 80053 COMPREHEN METABOLIC PANEL: CPT | Performed by: INTERNAL MEDICINE

## 2025-07-30 PROCEDURE — 25810000003 SODIUM CHLORIDE 0.9 % SOLUTION 250 ML FLEX CONT: Performed by: INTERNAL MEDICINE

## 2025-07-30 PROCEDURE — 85025 COMPLETE CBC W/AUTO DIFF WBC: CPT | Performed by: INTERNAL MEDICINE

## 2025-07-30 PROCEDURE — 96413 CHEMO IV INFUSION 1 HR: CPT

## 2025-07-30 PROCEDURE — 84439 ASSAY OF FREE THYROXINE: CPT | Performed by: INTERNAL MEDICINE

## 2025-07-30 PROCEDURE — 1126F AMNT PAIN NOTED NONE PRSNT: CPT | Performed by: INTERNAL MEDICINE

## 2025-07-30 PROCEDURE — 25010000002 DURVALUMAB 500 MG/10ML SOLUTION 10 ML VIAL: Performed by: INTERNAL MEDICINE

## 2025-07-30 PROCEDURE — 84443 ASSAY THYROID STIM HORMONE: CPT | Performed by: INTERNAL MEDICINE

## 2025-07-30 RX ORDER — SODIUM CHLORIDE 9 MG/ML
20 INJECTION, SOLUTION INTRAVENOUS ONCE
Status: CANCELLED | OUTPATIENT
Start: 2025-07-30

## 2025-07-30 RX ORDER — SODIUM CHLORIDE 9 MG/ML
20 INJECTION, SOLUTION INTRAVENOUS ONCE
Status: DISCONTINUED | OUTPATIENT
Start: 2025-07-30 | End: 2025-07-31 | Stop reason: HOSPADM

## 2025-07-30 RX ADMIN — SODIUM CHLORIDE 1500 MG: 9 INJECTION, SOLUTION INTRAVENOUS at 11:16

## 2025-07-30 NOTE — PROGRESS NOTES
DATE OF VISIT: 7/30/2025    REASON FOR VISIT: Followup for right lower lobe small cell lung cancer     PROBLEM LIST:  1. Right lower lobe small cell lung cancer T1 N1 M0 stage IIb:  A.  Presented with ataxia and lower extremities weakness  B.  Negative bronchoscopy August 19, 2020 and endobronchial ultrasound September 9, 2020  C.  CT chest done December 7, 2020 revealed 2.7 cm right hilar mass  D.  Whole-body PET scan done January 11, 2020 revealed isolated hypermetabolic activity at the right hilar mass SUV of 12  E. positive serology for AGN A-1 antibody which is 92% predicted for small cell lung cancer, positive N type calcium channel blocker and P/Q type calcium channel blooker.  F.  Started chest radiation February 26, 2021, completed end of March 2021  G.  Repeated PET scan done June 08, 2023 revealed complete resolution of activity in the chest however new portacaval hypermetabolic mass.  Biopsy-proven small cell cancer July 12, 2023  H.  Treated with definitive radiotherapy, completed July 2023.  I.  Progressive disease with worsening para-aortic lymphadenopathy document CT scans in February 1, 2024.  J.  Started chemotherapy with carboplatin etoposide and Imfinzi February 2024, Status post 4 cycles.  K.  Started maintenance Imfinzi May 8, 2024.  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  Hypertension  4.  Hypercholesteremia      HISTORY OF PRESENT ILLNESS: The patient is a very pleasant 65 y.o. female with past medical history significant for suspected small cell lung cancer of the right lung. The patient never had a positive biopsy however her serology was suggestive of SCLCA after presenting with Lambert-Eaton syndrome. The patient is here today for scheduled follow up visit with treatment maintenance therapy, Imfinzi cycle #16.      SUBJECTIVE: Gayatri is here today with her .  She is complaining of dizziness.  She is anxious about the scan results.    Past  History:  Medical History: has a past medical history of Arthritis, Disease of thyroid gland, Heart disease, History of radiation therapy (08/11/2023), Hyperlipidemia, Lambert-Eaton syndrome, Lung cancer (02/2021), Metastatic cancer, Pneumonia, and Requires supplemental oxygen.   Surgical History: has a past surgical history that includes Hysterectomy; Foot surgery; and Knee surgery.   Family History: family history includes Brain cancer in her father; Breast cancer in her sister; Heart disease in her brother, mother, and sister; Kidney disease in her mother.   Social History: reports that she quit smoking about 2 years ago. Her smoking use included cigarettes. She started smoking about 18 years ago. She has a 15 pack-year smoking history. She has never used smokeless tobacco. She reports that she does not drink alcohol and does not use drugs.    (Not in a hospital admission)     Allergies: Erythromycin     Review of Systems   Constitutional:  Positive for fatigue.   Respiratory: Negative.     Gastrointestinal: Negative.    Neurological:  Positive for weakness.         Current Outpatient Medications:     Amifampridine Phosphate (Firdapse) 10 MG tablet, Take 2 tablets by mouth 4 (Four) Times a Day., Disp: , Rfl:     aspirin 81 MG EC tablet, Take 1 tablet by mouth Daily. 7-6-23 last dose, Disp: , Rfl:     atorvastatin (LIPITOR) 20 MG tablet, , Disp: , Rfl:     azaTHIOprine (IMURAN) 50 MG tablet, Take 1 tablet (50 mg) by mouth 1 (one) time each day for 14 days, THEN 2 tablets (100 mg) 1 (one) time each day for 14 days, THEN 3 tablets (150 mg) 1 (one) time each day thereafter., Disp: , Rfl:     furosemide (LASIX) 40 MG tablet, Take 1 tablet by mouth Daily., Disp: , Rfl:     HYDROcodone-acetaminophen (NORCO) 5-325 MG per tablet, , Disp: , Rfl:     ipratropium-albuterol (DUO-NEB) 0.5-2.5 mg/3 ml nebulizer, , Disp: , Rfl:     meclizine (ANTIVERT) 25 MG tablet, Take 1 tablet by mouth 3 (Three) Times a Day As Needed., Disp:  ", Rfl:     methocarbamol (ROBAXIN) 750 MG tablet, Take 1 tablet by mouth 4 (Four) Times a Day., Disp: , Rfl:     metoprolol tartrate (LOPRESSOR) 50 MG tablet, Take 0.5 tablets by mouth Daily As Needed. Patient states she takes as needed., Disp: , Rfl:     multivitamin with minerals tablet tablet, Take 1 tablet by mouth Daily., Disp: 30 each, Rfl: 0    naloxone (NARCAN) 4 MG/0.1ML nasal spray, Call 911. Don't prime. [place] in 1 nostril for overdose. Repeat in 2-3 minutes in other nostril if no or minimal breathing/responsiveness., Disp: 2 each, Rfl: 0    oxyCODONE-acetaminophen (PERCOCET) 5-325 MG per tablet, Take 1 tablet by mouth Every 6 (Six) Hours As Needed for Moderate Pain or Severe Pain., Disp: 12 tablet, Rfl: 0    potassium chloride (K-DUR,KLOR-CON) 20 MEQ CR tablet, Take 1 tablet by mouth Daily., Disp: , Rfl:     potassium chloride ER (K-TAB) 20 MEQ tablet controlled-release ER tablet, Take 1 tablet by mouth Daily., Disp: , Rfl:     spironolactone (ALDACTONE) 25 MG tablet, Take 1 tablet by mouth Daily., Disp: , Rfl:   No current facility-administered medications for this visit.    PHYSICAL EXAMINATION:   BP 98/64   Pulse 75   Temp 96.9 °F (36.1 °C) (Temporal)   Resp 16   Ht 167.6 cm (65.98\")   Wt 66.7 kg (147 lb) Comment: pt reported  SpO2 94%   BMI 23.74 kg/m²    Pain Score    07/30/25 0813   PainSc: 0-No pain   PainLoc: Comment: dizzinesss                      ECOG Performance Status: 2 - Symptomatic, <50% confined to bed  General Appearance:  alert, cooperative, no apparent distress and appears stated age   Neurologic/Psychiatric: A&O x 3, gait steady, appropriate affect, strength 5/5 in all muscle groups   HEENT:  Normocephalic, without obvious abnormality, mucous membranes moist   Neck: Supple, symmetrical, trachea midline, no adenopathy;  No thyromegaly, masses, or tenderness   Lungs:   Clear to auscultation bilaterally; respirations regular, even, and unlabored bilaterally   Heart:  Regular rate " and rhythm, no murmurs appreciated   Abdomen:   Soft, non-tender, and non-distended   Lymph nodes: No cervical, supraclavicular, inguinal or axillary adenopathy noted   Extremities: Normal, atraumatic; no clubbing, cyanosis, or edema    Skin: No rashes, ulcers, or suspicious lesions noted     No visits with results within 2 Week(s) from this visit.   Latest known visit with results is:   Hospital Outpatient Visit on 07/02/2025   Component Date Value Ref Range Status    Glucose 07/02/2025 136 (H)  65 - 99 mg/dL Final    BUN 07/02/2025 10.0  8.0 - 23.0 mg/dL Final    Creatinine 07/02/2025 0.80  0.57 - 1.00 mg/dL Final    Sodium 07/02/2025 140  136 - 145 mmol/L Final    Potassium 07/02/2025 4.0  3.5 - 5.2 mmol/L Final    Chloride 07/02/2025 105  98 - 107 mmol/L Final    CO2 07/02/2025 24.5  22.0 - 29.0 mmol/L Final    Calcium 07/02/2025 9.7  8.6 - 10.5 mg/dL Final    Total Protein 07/02/2025 6.8  6.0 - 8.5 g/dL Final    Albumin 07/02/2025 4.1  3.5 - 5.2 g/dL Final    ALT (SGPT) 07/02/2025 18  1 - 33 U/L Final    AST (SGOT) 07/02/2025 21  1 - 32 U/L Final    Alkaline Phosphatase 07/02/2025 113  39 - 117 U/L Final    Total Bilirubin 07/02/2025 0.3  0.0 - 1.2 mg/dL Final    Globulin 07/02/2025 2.7  gm/dL Final    A/G Ratio 07/02/2025 1.5  g/dL Final    BUN/Creatinine Ratio 07/02/2025 12.5  7.0 - 25.0 Final    Anion Gap 07/02/2025 10.5  5.0 - 15.0 mmol/L Final    eGFR 07/02/2025 82.4  >60.0 mL/min/1.73 Final    TSH 07/02/2025 1.970  0.270 - 4.200 uIU/mL Final    Free T4 07/02/2025 1.59  0.92 - 1.68 ng/dL Final    WBC 07/02/2025 5.51  3.40 - 10.80 10*3/mm3 Final    RBC 07/02/2025 4.40  3.77 - 5.28 10*6/mm3 Final    Hemoglobin 07/02/2025 13.2  12.0 - 15.9 g/dL Final    Hematocrit 07/02/2025 39.2  34.0 - 46.6 % Final    MCV 07/02/2025 89.1  79.0 - 97.0 fL Final    MCH 07/02/2025 30.0  26.6 - 33.0 pg Final    MCHC 07/02/2025 33.7  31.5 - 35.7 g/dL Final    RDW 07/02/2025 12.6  12.3 - 15.4 % Final    RDW-SD 07/02/2025 41.3   37.0 - 54.0 fl Final    MPV 07/02/2025 9.2  6.0 - 12.0 fL Final    Platelets 07/02/2025 239  140 - 450 10*3/mm3 Final    Neutrophil % 07/02/2025 67.9  42.7 - 76.0 % Final    Lymphocyte % 07/02/2025 21.4  19.6 - 45.3 % Final    Monocyte % 07/02/2025 7.6  5.0 - 12.0 % Final    Eosinophil % 07/02/2025 2.2  0.3 - 6.2 % Final    Basophil % 07/02/2025 0.7  0.0 - 1.5 % Final    Immature Grans % 07/02/2025 0.2  0.0 - 0.5 % Final    Neutrophils, Absolute 07/02/2025 3.74  1.70 - 7.00 10*3/mm3 Final    Lymphocytes, Absolute 07/02/2025 1.18  0.70 - 3.10 10*3/mm3 Final    Monocytes, Absolute 07/02/2025 0.42  0.10 - 0.90 10*3/mm3 Final    Eosinophils, Absolute 07/02/2025 0.12  0.00 - 0.40 10*3/mm3 Final    Basophils, Absolute 07/02/2025 0.04  0.00 - 0.20 10*3/mm3 Final    Immature Grans, Absolute 07/02/2025 0.01  0.00 - 0.05 10*3/mm3 Final    nRBC 07/02/2025 0.0  0.0 - 0.2 /100 WBC Final        No results found.        ASSESSMENT: The patient is a very pleasant 65 y.o. female  with small cell lung cancer      PLAN:    1. Right lower lobe small cell lung cancer TxN2 M1b stage Mayelin:  A.  I will proceed with treatment as scheduled today maintenance immunotherapy Imfinzi cycle #16.  B.  She will follow-up with us in 4 weeks for cycle #17.  C. We reviewed potential side effects of immunotherapy including but not limited to immune mediated reactions with thyroiditis, pneumonitis, hepatitis, colitis, rash, and electrolyte abnormalities, fatigue, multiorgan failure, and possibly death.  D.  I will continue to monitor the patient blood work including blood counts kidney function liver function and electrolytes.  E.  Discussed with the patient blood work from July 2, 2025 revealed normal CBC glucose 136 creatinine 0.8 normal liver enzymes.  F.  Scans has been done yesterday no official read yet I will follow-up on the reading.    2. Lambert-Eaton syndrome:  A. She will continue Firdapse 10 mg 4 times daily as prescribed by neurology.    3.  Hypothyroid:  A. She will continue Synthroid daily    4.  Poor systolic cardiac function:  A.  Status post defibrillator placement.    5.  Left hip pain:  A.  She is scheduled to see Ortho next week.    6.  Chemotherapy-induced nausea:  A.  I will continue Zofran as needed.    7.  Dizziness  A.  Continue to follow-up with neurology for management.  B.  Continue meclizine 3 times a day as directed by neurology    FOLLOW UP: 4 weeks with cycle 17.    Susi Irving MD  7/30/2025

## 2025-08-04 ENCOUNTER — TELEPHONE (OUTPATIENT)
Dept: ONCOLOGY | Facility: CLINIC | Age: 65
End: 2025-08-04
Payer: MEDICARE

## 2025-08-27 ENCOUNTER — OFFICE VISIT (OUTPATIENT)
Dept: ONCOLOGY | Facility: CLINIC | Age: 65
End: 2025-08-27
Payer: MEDICARE

## 2025-08-27 ENCOUNTER — HOSPITAL ENCOUNTER (OUTPATIENT)
Facility: HOSPITAL | Age: 65
Discharge: HOME | End: 2025-08-27
Payer: MEDICARE

## 2025-08-27 VITALS
HEART RATE: 81 BPM | DIASTOLIC BLOOD PRESSURE: 67 MMHG | TEMPERATURE: 97.8 F | BODY MASS INDEX: 24.27 KG/M2 | HEIGHT: 66 IN | OXYGEN SATURATION: 94 % | WEIGHT: 151 LBS | RESPIRATION RATE: 16 BRPM | SYSTOLIC BLOOD PRESSURE: 101 MMHG

## 2025-08-27 DIAGNOSIS — C34.31 SMALL CELL LUNG CANCER, RIGHT LOWER LOBE: ICD-10-CM

## 2025-08-27 DIAGNOSIS — C77.2 SECONDARY MALIGNANT NEOPLASM OF INTRA-ABDOMINAL LYMPH NODES: Primary | ICD-10-CM

## 2025-08-27 PROCEDURE — 99214 OFFICE O/P EST MOD 30 MIN: CPT | Performed by: NURSE PRACTITIONER

## 2025-08-27 PROCEDURE — 1126F AMNT PAIN NOTED NONE PRSNT: CPT | Performed by: NURSE PRACTITIONER

## 2025-08-27 PROCEDURE — 1159F MED LIST DOCD IN RCRD: CPT | Performed by: NURSE PRACTITIONER

## 2025-08-27 PROCEDURE — 1160F RVW MEDS BY RX/DR IN RCRD: CPT | Performed by: NURSE PRACTITIONER

## 2025-08-27 RX ORDER — SODIUM CHLORIDE 9 MG/ML
20 INJECTION, SOLUTION INTRAVENOUS ONCE
OUTPATIENT
Start: 2025-09-03